# Patient Record
Sex: MALE | Race: WHITE | NOT HISPANIC OR LATINO | Employment: FULL TIME | URBAN - METROPOLITAN AREA
[De-identification: names, ages, dates, MRNs, and addresses within clinical notes are randomized per-mention and may not be internally consistent; named-entity substitution may affect disease eponyms.]

---

## 2023-08-21 ENCOUNTER — HOSPITAL ENCOUNTER (OUTPATIENT)
Dept: DATA CONVERSION | Facility: HOSPITAL | Age: 53
Discharge: HOME | End: 2023-08-21
Payer: COMMERCIAL

## 2023-08-21 DIAGNOSIS — I10 ESSENTIAL (PRIMARY) HYPERTENSION: ICD-10-CM

## 2023-08-21 LAB
ANION GAP SERPL CALCULATED.3IONS-SCNC: 18 MMOL/L (ref 0–19)
BUN SERPL-MCNC: 16 MG/DL (ref 8–25)
BUN/CREAT SERPL: 13.3 RATIO (ref 8–21)
CALCIUM SERPL-MCNC: 9.4 MG/DL (ref 8.5–10.4)
CHLORIDE SERPL-SCNC: 100 MMOL/L (ref 97–107)
CO2 SERPL-SCNC: 23 MMOL/L (ref 24–31)
CREAT SERPL-MCNC: 1.2 MG/DL (ref 0.4–1.6)
GFR SERPL CREATININE-BSD FRML MDRD: 71 ML/MIN/1.73 M2
GLUCOSE SERPL-MCNC: 104 MG/DL (ref 65–99)
POTASSIUM SERPL-SCNC: 3.9 MMOL/L (ref 3.4–5.1)
SODIUM SERPL-SCNC: 141 MMOL/L (ref 133–145)

## 2023-09-29 ENCOUNTER — HOSPITAL ENCOUNTER (OUTPATIENT)
Dept: DATA CONVERSION | Facility: HOSPITAL | Age: 53
Discharge: HOME | End: 2023-09-29
Payer: COMMERCIAL

## 2023-09-29 DIAGNOSIS — M51.36 OTHER INTERVERTEBRAL DISC DEGENERATION, LUMBAR REGION: ICD-10-CM

## 2023-09-29 DIAGNOSIS — M47.26 OTHER SPONDYLOSIS WITH RADICULOPATHY, LUMBAR REGION: ICD-10-CM

## 2023-10-29 DIAGNOSIS — I10 PRIMARY HYPERTENSION: Primary | ICD-10-CM

## 2023-10-30 RX ORDER — HYDROCHLOROTHIAZIDE 25 MG/1
25 TABLET ORAL DAILY
Qty: 90 TABLET | Refills: 1 | Status: SHIPPED | OUTPATIENT
Start: 2023-10-30 | End: 2023-12-16 | Stop reason: HOSPADM

## 2023-11-28 ENCOUNTER — APPOINTMENT (OUTPATIENT)
Dept: RADIOLOGY | Facility: HOSPITAL | Age: 53
DRG: 392 | End: 2023-11-28
Payer: COMMERCIAL

## 2023-11-28 ENCOUNTER — HOSPITAL ENCOUNTER (INPATIENT)
Facility: HOSPITAL | Age: 53
LOS: 2 days | Discharge: HOME | DRG: 392 | End: 2023-11-30
Attending: STUDENT IN AN ORGANIZED HEALTH CARE EDUCATION/TRAINING PROGRAM | Admitting: INTERNAL MEDICINE
Payer: COMMERCIAL

## 2023-11-28 DIAGNOSIS — R10.9 ABDOMINAL PAIN: ICD-10-CM

## 2023-11-28 DIAGNOSIS — K57.20 DIVERTICULITIS OF LARGE INTESTINE WITH ABSCESS WITHOUT BLEEDING: Primary | ICD-10-CM

## 2023-11-28 DIAGNOSIS — E87.6 HYPOKALEMIA: ICD-10-CM

## 2023-11-28 PROBLEM — K57.80 DIVERTICULITIS OF INTESTINE WITH ABSCESS: Status: ACTIVE | Noted: 2023-11-28

## 2023-11-28 LAB
ALBUMIN SERPL-MCNC: 4.2 G/DL (ref 3.5–5)
ALP BLD-CCNC: 93 U/L (ref 35–125)
ALT SERPL-CCNC: 44 U/L (ref 5–40)
ANION GAP SERPL CALC-SCNC: 11 MMOL/L
APPEARANCE UR: CLEAR
AST SERPL-CCNC: 23 U/L (ref 5–40)
BASOPHILS # BLD AUTO: 0.03 X10*3/UL (ref 0–0.1)
BASOPHILS NFR BLD AUTO: 0.2 %
BILIRUB SERPL-MCNC: 0.4 MG/DL (ref 0.1–1.2)
BILIRUB UR STRIP.AUTO-MCNC: NEGATIVE MG/DL
BUN SERPL-MCNC: 12 MG/DL (ref 8–25)
CALCIUM SERPL-MCNC: 9 MG/DL (ref 8.5–10.4)
CHLORIDE SERPL-SCNC: 96 MMOL/L (ref 97–107)
CO2 SERPL-SCNC: 28 MMOL/L (ref 24–31)
COLOR UR: ABNORMAL
CREAT SERPL-MCNC: 1.1 MG/DL (ref 0.4–1.6)
EOSINOPHIL # BLD AUTO: 0.04 X10*3/UL (ref 0–0.7)
EOSINOPHIL NFR BLD AUTO: 0.3 %
ERYTHROCYTE [DISTWIDTH] IN BLOOD BY AUTOMATED COUNT: 11.9 % (ref 11.5–14.5)
GFR SERPL CREATININE-BSD FRML MDRD: 79 ML/MIN/1.73M*2
GLUCOSE SERPL-MCNC: 137 MG/DL (ref 65–99)
GLUCOSE UR STRIP.AUTO-MCNC: NORMAL MG/DL
HCT VFR BLD AUTO: 46 % (ref 41–52)
HGB BLD-MCNC: 16.1 G/DL (ref 13.5–17.5)
IMM GRANULOCYTES # BLD AUTO: 0.06 X10*3/UL (ref 0–0.7)
IMM GRANULOCYTES NFR BLD AUTO: 0.4 % (ref 0–0.9)
KETONES UR STRIP.AUTO-MCNC: NEGATIVE MG/DL
LEUKOCYTE ESTERASE UR QL STRIP.AUTO: NEGATIVE
LYMPHOCYTES # BLD AUTO: 2.65 X10*3/UL (ref 1.2–4.8)
LYMPHOCYTES NFR BLD AUTO: 18.6 %
MCH RBC QN AUTO: 32.2 PG (ref 26–34)
MCHC RBC AUTO-ENTMCNC: 35 G/DL (ref 32–36)
MCV RBC AUTO: 92 FL (ref 80–100)
MONOCYTES # BLD AUTO: 0.68 X10*3/UL (ref 0.1–1)
MONOCYTES NFR BLD AUTO: 4.8 %
NEUTROPHILS # BLD AUTO: 10.8 X10*3/UL (ref 1.2–7.7)
NEUTROPHILS NFR BLD AUTO: 75.7 %
NITRITE UR QL STRIP.AUTO: NEGATIVE
NRBC BLD-RTO: 0 /100 WBCS (ref 0–0)
PH UR STRIP.AUTO: 5.5 [PH]
PLATELET # BLD AUTO: 287 X10*3/UL (ref 150–450)
POTASSIUM SERPL-SCNC: 3.3 MMOL/L (ref 3.4–5.1)
PROT SERPL-MCNC: 8 G/DL (ref 5.9–7.9)
PROT UR STRIP.AUTO-MCNC: NEGATIVE MG/DL
RBC # BLD AUTO: 5 X10*6/UL (ref 4.5–5.9)
RBC # UR STRIP.AUTO: ABNORMAL /UL
RBC #/AREA URNS AUTO: NORMAL /HPF
SODIUM SERPL-SCNC: 135 MMOL/L (ref 133–145)
SP GR UR STRIP.AUTO: 1.01
UROBILINOGEN UR STRIP.AUTO-MCNC: NORMAL MG/DL
WBC # BLD AUTO: 14.3 X10*3/UL (ref 4.4–11.3)
WBC #/AREA URNS AUTO: NORMAL /HPF

## 2023-11-28 PROCEDURE — 36415 COLL VENOUS BLD VENIPUNCTURE: CPT | Performed by: STUDENT IN AN ORGANIZED HEALTH CARE EDUCATION/TRAINING PROGRAM

## 2023-11-28 PROCEDURE — 49406 IMAGE CATH FLUID PERI/RETRO: CPT | Mod: 52

## 2023-11-28 PROCEDURE — 81001 URINALYSIS AUTO W/SCOPE: CPT | Performed by: STUDENT IN AN ORGANIZED HEALTH CARE EDUCATION/TRAINING PROGRAM

## 2023-11-28 PROCEDURE — 2500000004 HC RX 250 GENERAL PHARMACY W/ HCPCS (ALT 636 FOR OP/ED): Performed by: NURSE PRACTITIONER

## 2023-11-28 PROCEDURE — 85025 COMPLETE CBC W/AUTO DIFF WBC: CPT | Performed by: STUDENT IN AN ORGANIZED HEALTH CARE EDUCATION/TRAINING PROGRAM

## 2023-11-28 PROCEDURE — 2500000004 HC RX 250 GENERAL PHARMACY W/ HCPCS (ALT 636 FOR OP/ED): Performed by: STUDENT IN AN ORGANIZED HEALTH CARE EDUCATION/TRAINING PROGRAM

## 2023-11-28 PROCEDURE — 2550000001 HC RX 255 CONTRASTS: Performed by: STUDENT IN AN ORGANIZED HEALTH CARE EDUCATION/TRAINING PROGRAM

## 2023-11-28 PROCEDURE — 99285 EMERGENCY DEPT VISIT HI MDM: CPT | Performed by: STUDENT IN AN ORGANIZED HEALTH CARE EDUCATION/TRAINING PROGRAM

## 2023-11-28 PROCEDURE — 96365 THER/PROPH/DIAG IV INF INIT: CPT

## 2023-11-28 PROCEDURE — 80053 COMPREHEN METABOLIC PANEL: CPT | Performed by: STUDENT IN AN ORGANIZED HEALTH CARE EDUCATION/TRAINING PROGRAM

## 2023-11-28 PROCEDURE — 1100000001 HC PRIVATE ROOM DAILY

## 2023-11-28 PROCEDURE — 74177 CT ABD & PELVIS W/CONTRAST: CPT

## 2023-11-28 RX ORDER — POTASSIUM CHLORIDE 20 MEQ/1
40 TABLET, EXTENDED RELEASE ORAL ONCE
Status: COMPLETED | OUTPATIENT
Start: 2023-11-28 | End: 2023-11-28

## 2023-11-28 RX ORDER — ACETAMINOPHEN 325 MG/1
650 TABLET ORAL EVERY 4 HOURS PRN
Status: DISCONTINUED | OUTPATIENT
Start: 2023-11-28 | End: 2023-11-30 | Stop reason: HOSPADM

## 2023-11-28 RX ORDER — HYDROCHLOROTHIAZIDE 25 MG/1
25 TABLET ORAL DAILY
Status: DISCONTINUED | OUTPATIENT
Start: 2023-11-28 | End: 2023-11-30 | Stop reason: HOSPADM

## 2023-11-28 RX ORDER — PANTOPRAZOLE SODIUM 40 MG/1
40 TABLET, DELAYED RELEASE ORAL
Status: DISCONTINUED | OUTPATIENT
Start: 2023-11-29 | End: 2023-11-30 | Stop reason: HOSPADM

## 2023-11-28 RX ORDER — PANTOPRAZOLE SODIUM 40 MG/10ML
40 INJECTION, POWDER, LYOPHILIZED, FOR SOLUTION INTRAVENOUS
Status: DISCONTINUED | OUTPATIENT
Start: 2023-11-29 | End: 2023-11-30 | Stop reason: HOSPADM

## 2023-11-28 RX ORDER — LANSOPRAZOLE 30 MG/1
30 CAPSULE, DELAYED RELEASE ORAL
COMMUNITY
Start: 2017-01-06

## 2023-11-28 RX ORDER — ACETAMINOPHEN 650 MG/1
650 SUPPOSITORY RECTAL EVERY 4 HOURS PRN
Status: DISCONTINUED | OUTPATIENT
Start: 2023-11-28 | End: 2023-11-30 | Stop reason: HOSPADM

## 2023-11-28 RX ORDER — TRAMADOL HYDROCHLORIDE 50 MG/1
50 TABLET ORAL EVERY 6 HOURS PRN
Status: DISCONTINUED | OUTPATIENT
Start: 2023-11-28 | End: 2023-11-30 | Stop reason: HOSPADM

## 2023-11-28 RX ORDER — SODIUM CHLORIDE 9 MG/ML
100 INJECTION, SOLUTION INTRAVENOUS CONTINUOUS
Status: DISCONTINUED | OUTPATIENT
Start: 2023-11-28 | End: 2023-11-29

## 2023-11-28 RX ORDER — ONDANSETRON 4 MG/1
4 TABLET, ORALLY DISINTEGRATING ORAL EVERY 8 HOURS PRN
Status: DISCONTINUED | OUTPATIENT
Start: 2023-11-28 | End: 2023-11-30 | Stop reason: HOSPADM

## 2023-11-28 RX ORDER — POLYETHYLENE GLYCOL 3350 17 G/17G
17 POWDER, FOR SOLUTION ORAL DAILY PRN
Status: DISCONTINUED | OUTPATIENT
Start: 2023-11-28 | End: 2023-11-28

## 2023-11-28 RX ORDER — HYDRALAZINE HYDROCHLORIDE 20 MG/ML
10 INJECTION INTRAMUSCULAR; INTRAVENOUS EVERY 6 HOURS PRN
Status: DISCONTINUED | OUTPATIENT
Start: 2023-11-28 | End: 2023-11-30 | Stop reason: HOSPADM

## 2023-11-28 RX ORDER — ACETAMINOPHEN 160 MG/5ML
650 SOLUTION ORAL EVERY 4 HOURS PRN
Status: DISCONTINUED | OUTPATIENT
Start: 2023-11-28 | End: 2023-11-30 | Stop reason: HOSPADM

## 2023-11-28 RX ORDER — MORPHINE SULFATE 2 MG/ML
2 INJECTION, SOLUTION INTRAMUSCULAR; INTRAVENOUS EVERY 4 HOURS PRN
Status: DISCONTINUED | OUTPATIENT
Start: 2023-11-28 | End: 2023-11-30 | Stop reason: HOSPADM

## 2023-11-28 RX ORDER — ONDANSETRON HYDROCHLORIDE 2 MG/ML
4 INJECTION, SOLUTION INTRAVENOUS EVERY 8 HOURS PRN
Status: DISCONTINUED | OUTPATIENT
Start: 2023-11-28 | End: 2023-11-30 | Stop reason: HOSPADM

## 2023-11-28 RX ADMIN — PIPERACILLIN SODIUM AND TAZOBACTAM SODIUM 3.38 G: 3; .375 INJECTION, SOLUTION INTRAVENOUS at 20:27

## 2023-11-28 RX ADMIN — SODIUM CHLORIDE 3 G: 900 INJECTION INTRAVENOUS at 11:55

## 2023-11-28 RX ADMIN — IOHEXOL 75 ML: 350 INJECTION, SOLUTION INTRAVENOUS at 10:23

## 2023-11-28 RX ADMIN — POTASSIUM CHLORIDE 40 MEQ: 1500 TABLET, EXTENDED RELEASE ORAL at 11:06

## 2023-11-28 RX ADMIN — SODIUM CHLORIDE 100 ML/HR: 900 INJECTION, SOLUTION INTRAVENOUS at 14:21

## 2023-11-28 RX ADMIN — PIPERACILLIN SODIUM AND TAZOBACTAM SODIUM 3.38 G: 3; .375 INJECTION, SOLUTION INTRAVENOUS at 14:46

## 2023-11-28 SDOH — SOCIAL STABILITY: SOCIAL INSECURITY: ARE YOU OR HAVE YOU BEEN THREATENED OR ABUSED PHYSICALLY, EMOTIONALLY, OR SEXUALLY BY ANYONE?: NO

## 2023-11-28 SDOH — SOCIAL STABILITY: SOCIAL INSECURITY: DOES ANYONE TRY TO KEEP YOU FROM HAVING/CONTACTING OTHER FRIENDS OR DOING THINGS OUTSIDE YOUR HOME?: NO

## 2023-11-28 SDOH — SOCIAL STABILITY: SOCIAL INSECURITY: ARE THERE ANY APPARENT SIGNS OF INJURIES/BEHAVIORS THAT COULD BE RELATED TO ABUSE/NEGLECT?: NO

## 2023-11-28 SDOH — SOCIAL STABILITY: SOCIAL INSECURITY: HAS ANYONE EVER THREATENED TO HURT YOUR FAMILY OR YOUR PETS?: NO

## 2023-11-28 SDOH — SOCIAL STABILITY: SOCIAL INSECURITY: ABUSE: ADULT

## 2023-11-28 SDOH — SOCIAL STABILITY: SOCIAL INSECURITY: DO YOU FEEL ANYONE HAS EXPLOITED OR TAKEN ADVANTAGE OF YOU FINANCIALLY OR OF YOUR PERSONAL PROPERTY?: NO

## 2023-11-28 SDOH — SOCIAL STABILITY: SOCIAL INSECURITY: DO YOU FEEL UNSAFE GOING BACK TO THE PLACE WHERE YOU ARE LIVING?: NO

## 2023-11-28 SDOH — SOCIAL STABILITY: SOCIAL INSECURITY: WERE YOU ABLE TO COMPLETE ALL THE BEHAVIORAL HEALTH SCREENINGS?: YES

## 2023-11-28 SDOH — SOCIAL STABILITY: SOCIAL INSECURITY: HAVE YOU HAD THOUGHTS OF HARMING ANYONE ELSE?: NO

## 2023-11-28 ASSESSMENT — PAIN DESCRIPTION - LOCATION: LOCATION: ABDOMEN

## 2023-11-28 ASSESSMENT — ACTIVITIES OF DAILY LIVING (ADL)
FEEDING YOURSELF: INDEPENDENT
PATIENT'S MEMORY ADEQUATE TO SAFELY COMPLETE DAILY ACTIVITIES?: YES
JUDGMENT_ADEQUATE_SAFELY_COMPLETE_DAILY_ACTIVITIES: YES
TOILETING: INDEPENDENT
HEARING - LEFT EAR: FUNCTIONAL
LACK_OF_TRANSPORTATION: NO
WALKS IN HOME: INDEPENDENT
HEARING - RIGHT EAR: FUNCTIONAL
BATHING: INDEPENDENT
ADEQUATE_TO_COMPLETE_ADL: YES
DRESSING YOURSELF: INDEPENDENT
GROOMING: INDEPENDENT

## 2023-11-28 ASSESSMENT — COGNITIVE AND FUNCTIONAL STATUS - GENERAL
PATIENT BASELINE BEDBOUND: NO
DAILY ACTIVITIY SCORE: 24
MOBILITY SCORE: 24
DAILY ACTIVITIY SCORE: 24
MOBILITY SCORE: 24

## 2023-11-28 ASSESSMENT — PAIN SCALES - GENERAL
PAINLEVEL_OUTOF10: 3
PAINLEVEL_OUTOF10: 0 - NO PAIN
PAINLEVEL_OUTOF10: 1
PAINLEVEL_OUTOF10: 2

## 2023-11-28 ASSESSMENT — COLUMBIA-SUICIDE SEVERITY RATING SCALE - C-SSRS
2. HAVE YOU ACTUALLY HAD ANY THOUGHTS OF KILLING YOURSELF?: NO
6. HAVE YOU EVER DONE ANYTHING, STARTED TO DO ANYTHING, OR PREPARED TO DO ANYTHING TO END YOUR LIFE?: NO
1. IN THE PAST MONTH, HAVE YOU WISHED YOU WERE DEAD OR WISHED YOU COULD GO TO SLEEP AND NOT WAKE UP?: NO

## 2023-11-28 ASSESSMENT — LIFESTYLE VARIABLES
HOW OFTEN DO YOU HAVE 6 OR MORE DRINKS ON ONE OCCASION: LESS THAN MONTHLY
AUDIT TOTAL SCORE: 3
HOW MANY STANDARD DRINKS CONTAINING ALCOHOL DO YOU HAVE ON A TYPICAL DAY: 1 OR 2
AUDIT TOTAL SCORE: 0
AUDIT-C TOTAL SCORE: 3
SKIP TO QUESTIONS 9-10: 0
HOW OFTEN DURING THE LAST YEAR HAVE YOU NEEDED AN ALCOHOLIC DRINK FIRST THING IN THE MORNING TO GET YOURSELF GOING AFTER A NIGHT OF HEAVY DRINKING: NEVER
HOW OFTEN DURING THE LAST YEAR HAVE YOU FOUND THAT YOU WERE NOT ABLE TO STOP DRINKING ONCE YOU HAD STARTED: NEVER
HOW OFTEN DURING THE LAST YEAR HAVE YOU FAILED TO DO WHAT WAS NORMALLY EXPECTED FROM YOU BECAUSE OF DRINKING: NEVER
HOW OFTEN DURING THE LAST YEAR HAVE YOU BEEN UNABLE TO REMEMBER WHAT HAPPENED THE NIGHT BEFORE BECAUSE YOU HAD BEEN DRINKING: NEVER
HAVE YOU OR SOMEONE ELSE BEEN INJURED AS A RESULT OF YOUR DRINKING: NO
HAS A RELATIVE, FRIEND, DOCTOR, OR ANOTHER HEALTH PROFESSIONAL EXPRESSED CONCERN ABOUT YOUR DRINKING OR SUGGESTED YOU CUT DOWN: NO
HOW OFTEN DURING THE LAST YEAR HAVE YOU HAD A FEELING OF GUILT OR REMORSE AFTER DRINKING: NEVER
AUDIT-C TOTAL SCORE: 3
HOW OFTEN DO YOU HAVE A DRINK CONTAINING ALCOHOL: 2-4 TIMES A MONTH

## 2023-11-28 ASSESSMENT — PATIENT HEALTH QUESTIONNAIRE - PHQ9
2. FEELING DOWN, DEPRESSED OR HOPELESS: NOT AT ALL
SUM OF ALL RESPONSES TO PHQ9 QUESTIONS 1 & 2: 0
1. LITTLE INTEREST OR PLEASURE IN DOING THINGS: NOT AT ALL

## 2023-11-28 ASSESSMENT — PAIN - FUNCTIONAL ASSESSMENT
PAIN_FUNCTIONAL_ASSESSMENT: 0-10

## 2023-11-28 NOTE — H&P
History Of Present Illness  Tom Treviño is a 53 y.o. male presenting with abdominal pain. States he has a history of diverticulitis, usually gets 2-3 flares per year. His last flare was back in August and resolved with oral abx. He has been having abdominal pain for the past 3 weeks that would come and go. He developed GI flu like symptoms last week, his whole family had same symptoms with nausea, vomiting and diarrhea. He states lasted for a day and resolved. Since then he has had worsening abdominal pain mostly located in the LLQ prompting him to come in for further evaluation. CT imaging of the abd showed acute diverticulitis with abscess collection. He denies any fevers, chills, SOB, chest pain, further nausea, vomiting or diarrhea, hematochezia,  melena, urinary symptoms prconstipation. Last BM was yesterday which he states was formed and normal. He did eat normal dinner last night.   ED: Unasyn, K 40meq x1, spoke with Dr. Delgado who recommended IR consult.   He will be admitted for further work up and management of acute diverticulitis with asbcess.      Past Medical History  Past Medical History:   Diagnosis Date    COVID-19     COVID-19    Diverticulitis     GERD (gastroesophageal reflux disease)     HTN (hypertension)        Surgical History  Past Surgical History:   Procedure Laterality Date    APPENDECTOMY  02/17/2015    Appendectomy        Social History  Home with his wife and child. Works in construction. Denies tobacco use. Very rare ETOH.     Family History  Mother is alive in her 80's without major medical issue.   Father passed in his 80's, stroke.      Allergies  Patient has no known allergies.    Review of Systems    Please see pertinent positives in the HPI and past medical and surgical histories.      Physical Exam     General: Pleasant, awake, alert.   HEENT: PERRLA, no facial asymmetry noted. Normocephalic, mucous membranes moist.   Neck: No JVD, supple.  Cardiovascular: Regular rate and  "rhythm. Normal S1 and S2. No murmurs, rubs or gallops.   Respiratory: Clear to auscultation on room air.   Abdomen: Soft, round, tender to palpation in the LLQ, no gaurding. Bowel sounds present and normoactive.  Extremities: No peripheral cyanosis. No edema.   Neurologic: Alert and oriented to person, place and time. Normal sensation.   Dermatologic: No rash, ecchymosis, or jaundice.  Psychological: Appropriate affect and behavior.     Last Recorded Vitals  Blood pressure (!) 143/91, pulse 75, temperature 36.5 °C (97.7 °F), temperature source Oral, resp. rate 18, height 1.88 m (6' 2\"), weight 121 kg (266 lb 5.1 oz), SpO2 98 %.    Relevant Results      Results for orders placed or performed during the hospital encounter of 11/28/23 (from the past 24 hour(s))   CBC and Auto Differential   Result Value Ref Range    WBC 14.3 (H) 4.4 - 11.3 x10*3/uL    nRBC 0.0 0.0 - 0.0 /100 WBCs    RBC 5.00 4.50 - 5.90 x10*6/uL    Hemoglobin 16.1 13.5 - 17.5 g/dL    Hematocrit 46.0 41.0 - 52.0 %    MCV 92 80 - 100 fL    MCH 32.2 26.0 - 34.0 pg    MCHC 35.0 32.0 - 36.0 g/dL    RDW 11.9 11.5 - 14.5 %    Platelets 287 150 - 450 x10*3/uL    Neutrophils % 75.7 40.0 - 80.0 %    Immature Granulocytes %, Automated 0.4 0.0 - 0.9 %    Lymphocytes % 18.6 13.0 - 44.0 %    Monocytes % 4.8 2.0 - 10.0 %    Eosinophils % 0.3 0.0 - 6.0 %    Basophils % 0.2 0.0 - 2.0 %    Neutrophils Absolute 10.80 (H) 1.20 - 7.70 x10*3/uL    Immature Granulocytes Absolute, Automated 0.06 0.00 - 0.70 x10*3/uL    Lymphocytes Absolute 2.65 1.20 - 4.80 x10*3/uL    Monocytes Absolute 0.68 0.10 - 1.00 x10*3/uL    Eosinophils Absolute 0.04 0.00 - 0.70 x10*3/uL    Basophils Absolute 0.03 0.00 - 0.10 x10*3/uL   Comprehensive metabolic panel   Result Value Ref Range    Glucose 137 (H) 65 - 99 mg/dL    Sodium 135 133 - 145 mmol/L    Potassium 3.3 (L) 3.4 - 5.1 mmol/L    Chloride 96 (L) 97 - 107 mmol/L    Bicarbonate 28 24 - 31 mmol/L    Urea Nitrogen 12 8 - 25 mg/dL    " Creatinine 1.10 0.40 - 1.60 mg/dL    eGFR 79 >60 mL/min/1.73m*2    Calcium 9.0 8.5 - 10.4 mg/dL    Albumin 4.2 3.5 - 5.0 g/dL    Alkaline Phosphatase 93 35 - 125 U/L    Total Protein 8.0 (H) 5.9 - 7.9 g/dL    AST 23 5 - 40 U/L    Bilirubin, Total 0.4 0.1 - 1.2 mg/dL    ALT 44 (H) 5 - 40 U/L    Anion Gap 11 <=19 mmol/L   Urinalysis with Reflex Microscopic   Result Value Ref Range    Color, Urine Light-Yellow Light-Yellow, Yellow, Dark-Yellow    Appearance, Urine Clear Clear    Specific Gravity, Urine 1.011 1.005 - 1.035    pH, Urine 5.5 5.0, 5.5, 6.0, 6.5, 7.0, 7.5, 8.0    Protein, Urine NEGATIVE NEGATIVE, 10 (TRACE), 20 (TRACE) mg/dL    Glucose, Urine Normal Normal mg/dL    Blood, Urine 0.03 (TRACE) (A) NEGATIVE    Ketones, Urine NEGATIVE NEGATIVE mg/dL    Bilirubin, Urine NEGATIVE NEGATIVE    Urobilinogen, Urine Normal Normal mg/dL    Nitrite, Urine NEGATIVE NEGATIVE    Leukocyte Esterase, Urine NEGATIVE NEGATIVE   Microscopic Only, Urine   Result Value Ref Range    WBC, Urine NONE 1-5, NONE /HPF    RBC, Urine NONE NONE, 1-2, 3-5 /HPF      CT abdomen pelvis w IV contrast    Result Date: 11/28/2023  Interpreted By:  Richard Gilman, STUDY: CT ABDOMEN PELVIS W IV CONTRAST; 11/28/2023 10:22 am   INDICATION: Signs/Symptoms:hx of diverticulitis, left lower quadrant pain;   COMPARISON: 07/10/2020   ACCESSION NUMBER(S): [id number]   ORDERING CLINICIAN: KAVON RIZZO   TECHNIQUE: Contiguous axial images of the abdomen/pelvis were performed with IV contrast. 75 ml of Omnipaque 350 was utilized. Coronal and sagittal reformatted images were also obtained. All CT examinations are performed with 1 or more of the following dose reduction techniques: Automated exposure control, adjustment of mA and/or kv according to patient's size, or use of iterative reconstruction techniques.     FINDINGS: The liver, gallbladder,  common bile duct, pancreas, spleen, and adrenal glands are unremarkable.   The kidneys enhance  symmetrically. No urolithiasis is seen. No hydroureteronephrosis is seen.   The visualized aorta is unremarkable.   The small bowel is not dilated. The appendix is not visualized. There is acute diverticulitis at the mid-distal sigmoid colon. There is abscess formation along the serosal surface width fluid and gas formation. The abscess measures approximately 4.4 x 3.4 cm in maximum dimensions.   The bladder is normally distended with no gross wall thickening.   The visualized osseous structures are intact.   Limited images of the lower thorax are unremarkable.       acute diverticulitis at the mid-distal sigmoid colon. There is abscess formation along the serosal surface width fluid and gas formation. The abscess measures approximately 4.4 x 3.4 cm in maximum dimensions.   Signed by: Richard Gilman 11/28/2023 11:18 AM Dictation workstation:   [id number]          Assessment/Plan     Acute Diverticulitis with Abscess / Abdominal Pain  -General Surgery and IR consults.   -CT abd/pelvis shows acute diverticulitis mid distal sigmoid colon with abscess formation with fluid and gas 4.4 cm x 3.4 cm.   -NPO for now.   -IV zosyn.   -IVF hydration.   -Pain control.   -PRN antiemetics.   -He follows outpatient with GI Dr. Cerrato, was scheduled for scope on Monday which is currently  on hold.     Leukocytosis  -Secondary to #1.   -Monitor.     HTN  -On hydrochlorothiazide at home, holding for now with need for hydration.   -PRN hydralazine.   -Monitor.     GERD  -PPI.     DVT Risk  -Holding off on pharm agent at this time in case drainage of abscess collection needed.   -SCDs.     Plan  Case and plan was discussed with patient, he is agreeable.   Case and plan was also discussed with my collaborating physician Dr. Raoul Carson.     CODE STATUS FULL.        I spent 60 minutes in the professional and overall care of this patient.      Joycelyn Solorio, ROWDY-CNP

## 2023-11-28 NOTE — CONSULTS
Reason For Consult  Diverticulitis with abscess- initially from the ER and then also IM     [facility]     History Of Present Illness  Tom Treviño is a 53 y.o. male on day 0 of admission presenting  with a chief complaint of left lower quadrant abdominal pain.  He initially had this a couple of weeks ago but it got better on his own without any antibiotics.  Him and his family then developed the GI flu that they are better from.  He developed left lower quadrant pain that started yesterday evening that was sudden, describes as intense, intermittent, persistent but no worse, no radiation, upon ER presentation was a 6-7 and was doubling him over and is currently a 2.  He has a history of diverticulitis and his current symptoms are similar to his diverticulitis symptoms in the past.  He has never been hospitalized for diverticulitis.  He was initially diagnosed with diverticulitis approximately 3 years ago and gets treated for it approximately twice a year as an outpatient.  Had subsequent nausea.  No vomiting.  No gas today.  Was passing gas yesterday.  Last bowel movement was this morning and was without blood and normal except for being a little blackish.  Says that the day before yesterday he did take some Pepto-Bismol.  Last colonoscopy was done 4 years ago by Dr. Cerrato for the indications of screening and was negative.  Says he was scheduled to have another colonoscopy this past Monday and had to cancel it because him and his family had the GI flu.  Has not eaten anything out of the ordinary or traveled anywhere.    Had some crackers around 8 this morning.  Last drank this morning around 9. Is not on any anticoagulants at home.  Please see his below past medical and past abdominal surgical history.  Please also see his labs and imaging that are noted.    Past Medical History  Diverticulitis  GERD  Hypertension  Obesity  Umbilical hernia    Surgical History  Open appendectomy   2/17/2015    Social History  Comes from home with his wife and son  Works in construction  Never smoker  No drugs  Alcohol wise says has couple per day and last 1 was last night       Family History  Denies any family history of diverticulitis or colon cancer       Allergies  No Known Allergies     Review of Systems  1) Anesthetic complications: No known personal or family history of anesthetic complications  2) General: No significant unintentional weight loss or gain, fevers, chills, weakness, fatigue, appetite loss.  3) HEENT: Negative.  4) Cardiac: No angina or leg edema.  5) Pulmonary: No asthma, wheezing, sob.  6) GI: As per HPI.  7) Hematologic: No known personal or family history of bleeding diathesis.  8) Endocrine: No diabetes or thyroid disorders.  9) : No dysuria, hematuria, or frequency.  10) Musculoskeletal: No muscle/bone/joint pain/stiffness/swelling.  11) Neuro: No history of seizures or strokes.  12) Psych: No anxiety or depression    Physical Exam  1) VS-noted as documented.  2) General-sitting in chair in no acute distress. Not septic appearing. Pleasant and cooperative. Looks fine and comfortable.   3) Neuro-Awake, alert, oriented to person, place, and time. Speech is normal. Affect is normal. Follows commands appropriately.  4) HEENT-Head normocephalic and externally atraumatic. Conjunctiva pink. Sclera anicteric. MMM.  5) Heart-RRR. Not on the monitor.  6) Lungs-Clear. Speaks in complete sentences and does not have any accessory muscle use.  7) Extremities-No edema. The patient's extremities are warm and normal color.  8) Skin-Warm and dry. No diaphoresis or jaundice.  9) Psych-Normal mood. Appropriate affect.   10) Abdomen-Soft. Positive bowel sounds. Non distended. Nontender.  No guarding/rebound.  Small umbilical hernia that is benign that the patient did not know he had  Obese    Vital signs in last 24 hours:  Temp:  [36.5 °C (97.7 °F)-36.8 °C (98.2 °F)] 36.8 °C (98.2 °F)  Heart Rate:   "[72-75] 72  Resp:  [18-20] 20  BP: (136-143)/(89-92) 136/89  Heart Rate:  [72-75]   Temp:  [36.5 °C (97.7 °F)-36.8 °C (98.2 °F)]   Resp:  [18-20]   BP: (136-143)/(89-92)   Height:  [188 cm (6' 2\")]   Weight:  [121 kg (266 lb 5.1 oz)]   SpO2:  [98 %-100 %]      Intake/Output last 3 Shifts:  No intake/output data recorded.    Scheduled medications  [Held by provider] hydroCHLOROthiazide, 25 mg, oral, Daily  [START ON 11/29/2023] pantoprazole, 40 mg, oral, Daily before breakfast   Or  [START ON 11/29/2023] pantoprazole, 40 mg, intravenous, Daily before breakfast  piperacillin-tazobactam, 3.375 g, intravenous, q6h      Continuous medications  sodium chloride 0.9%, 100 mL/hr      PRN medications  PRN medications: acetaminophen **OR** acetaminophen **OR** acetaminophen, hydrALAZINE, morphine, ondansetron ODT **OR** ondansetron, traMADol    Relevant Results  Results from last 7 days   Lab Units 11/28/23  0943   WBC AUTO x10*3/uL 14.3*   HEMOGLOBIN g/dL 16.1   HEMATOCRIT % 46.0   PLATELETS AUTO x10*3/uL 287      Results from last 7 days   Lab Units 11/28/23  0943   SODIUM mmol/L 135   POTASSIUM mmol/L 3.3*   CHLORIDE mmol/L 96*   CO2 mmol/L 28   BUN mg/dL 12   CREATININE mg/dL 1.10   GLUCOSE mg/dL 137*   CALCIUM mg/dL 9.0      CT abdomen pelvis w IV contrast  Result Date: 11/28/2023  Interpreted By:  Richard Gilman, STUDY: CT ABDOMEN PELVIS W IV CONTRAST; 11/28/2023 10:22 am   INDICATION: Signs/Symptoms:hx of diverticulitis, left lower quadrant pain;   COMPARISON: 07/10/2020   ACCESSION NUMBER(S): [id number]   ORDERING CLINICIAN: KAVON RIZZO   TECHNIQUE: Contiguous axial images of the abdomen/pelvis were performed with IV contrast. 75 ml of Omnipaque 350 was utilized. Coronal and sagittal reformatted images were also obtained. All CT examinations are performed with 1 or more of the following dose reduction techniques: Automated exposure control, adjustment of mA and/or kv according to patient's size, or use " of iterative reconstruction techniques.     FINDINGS: The liver, gallbladder,  common bile duct, pancreas, spleen, and adrenal glands are unremarkable.   The kidneys enhance symmetrically. No urolithiasis is seen. No hydroureteronephrosis is seen.   The visualized aorta is unremarkable.   The small bowel is not dilated. The appendix is not visualized. There is acute diverticulitis at the mid-distal sigmoid colon. There is abscess formation along the serosal surface width fluid and gas formation. The abscess measures approximately 4.4 x 3.4 cm in maximum dimensions.   The bladder is normally distended with no gross wall thickening.   The visualized osseous structures are intact.   Limited images of the lower thorax are unremarkable.       acute diverticulitis at the mid-distal sigmoid colon. There is abscess formation along the serosal surface width fluid and gas formation. The abscess measures approximately 4.4 x 3.4 cm in maximum dimensions.   Signed by: Richard Gilman 11/28/2023 11:18 AM Dictation workstation:   [id number]     Assessment/Plan   Diverticulitis of intestine with abscess  N.p.o.  IV fluids  On Zosyn  IR consulted for drainage  Morning labs ordered  Discussed with patient if he gets better without surgery then he needs to have a colonoscopy in 6 to 8 weeks down the road to make sure that this is only diverticulitis and not a perforated colon cancer  No acute surgical indications at present  Will follow    Umbilical hernia  Benign  No acute surgical indications for at present    DVT prophylaxis  Per IM        Mai Wells, APRN-CNP

## 2023-11-28 NOTE — ED PROVIDER NOTES
HPI   Chief Complaint   Patient presents with    Abdominal Pain     Abd pain x1 week, possible diverticulitis with history, over the holiday weekend had flu like s/s. Denies nausea and vomiting on arrival today        53-year-old male with past medical history of hypertension, high cholesterol presents with lower abdominal pain.  Patient states he had a prior history of diverticulitis, treated with oral antibiotics.  States he developed pain similar in nature approximately 2 weeks ago, left lower quadrant in location, nonradiating.  Pain started to spontaneously resolved until he became ill with a stomach flu, with associated nausea and vomiting and diarrhea approximately 4 days ago.  Notes his family was also sick with similar illness.  Since resolution of the stomach flu, he had return of his left lower quadrant pain, most severe earlier today on his way to work.  Denies current nausea, vomiting, diarrhea.  No known fevers or chills.  No bloody stools.                          No data recorded                Patient History   Past Medical History:   Diagnosis Date    COVID-19     COVID-19    Diverticulitis     GERD (gastroesophageal reflux disease)     HTN (hypertension)      Past Surgical History:   Procedure Laterality Date    APPENDECTOMY  02/17/2015    Appendectomy     No family history on file.  Social History     Tobacco Use    Smoking status: Not on file    Smokeless tobacco: Not on file   Substance Use Topics    Alcohol use: Not on file    Drug use: Not on file       Physical Exam   ED Triage Vitals [11/28/23 0939]   Temp Heart Rate Resp BP   36.5 °C (97.7 °F) 73 18 (!) 140/92      SpO2 Temp Source Heart Rate Source Patient Position   100 % Oral Monitor --      BP Location FiO2 (%)     Right arm --       Physical Exam  Vitals and nursing note reviewed.   Constitutional:       General: He is not in acute distress.     Appearance: He is not ill-appearing.   HENT:      Head: Normocephalic and atraumatic.       Mouth/Throat:      Mouth: Mucous membranes are moist.      Pharynx: Oropharynx is clear.   Eyes:      Extraocular Movements: Extraocular movements intact.      Conjunctiva/sclera: Conjunctivae normal.      Pupils: Pupils are equal, round, and reactive to light.   Cardiovascular:      Rate and Rhythm: Normal rate and regular rhythm.   Pulmonary:      Effort: Pulmonary effort is normal. No respiratory distress.      Breath sounds: Normal breath sounds.   Abdominal:      General: There is no distension.      Palpations: Abdomen is soft.      Tenderness: There is abdominal tenderness in the left lower quadrant.   Musculoskeletal:         General: No swelling or deformity. Normal range of motion.      Cervical back: Normal range of motion and neck supple.   Skin:     General: Skin is warm and dry.      Capillary Refill: Capillary refill takes less than 2 seconds.   Neurological:      General: No focal deficit present.      Mental Status: He is alert and oriented to person, place, and time. Mental status is at baseline.   Psychiatric:         Mood and Affect: Mood normal.         Behavior: Behavior normal.         ED Course & MDM   ED Course as of 11/28/23 1315   e Nov 28, 2023   1054 WBC(!): 14.3 []   1054 POTASSIUM(!): 3.3  Will supplement oral []      ED Course User Index  [] Elida Pritchett MD         Diagnoses as of 11/28/23 1315   Abdominal pain   Diverticulitis of large intestine with abscess without bleeding   Hypokalemia       Medical Decision Making  53 y.o. male presents with lower abdominal pain.  Considered appendicitis, diverticulitis, volvulus, mesenteric ischemia, AAA, small bowel obstruction, nephrolithiasis, UTI,  testicular torsion.  Patient noted to have leukocytosis of 14.  Mild hypokalemia, given oral supplementation.  CT of the abdomen shows diverticulitis of the sigmoid colon with associated abscess.  Spoke with general surgery who recommends admission to medicine with general  surgery and interventional radiology following.  Patient mated to hospitalist for further management.  Given IV Unasyn prior to admission.     External Records Reviewed: I reviewed recent and relevant outside records including: Previous notes, inpatient records, previous Labs, previous radiology     Diagnostic testing considered: US/XR but not indicated at this time          Procedure  Procedures     Elida Pritchett MD  11/28/23 [id number]       Elida Pritchett MD  11/28/23 [id number]

## 2023-11-28 NOTE — Clinical Note
Pt ambulated to ct table, supine, secured all vss at this time, placed on cardiac monitor- SR, 3L O2 NC on for sedation.

## 2023-11-28 NOTE — Clinical Note
After reviewing previous and current scans Dr. Reveles determined abscess appeared improved and procedure was cancelled, pt back supine on cart w/ hob elevated and SR up x 2, continues to deny abd pain

## 2023-11-28 NOTE — CARE PLAN
The patient's goals for the shift include  control pain    The clinical goals for the shift include control pain    Over the shift, the patient did not make progress toward the following goals. Barriers to progression include . Recommendations to address these barriers include .

## 2023-11-29 LAB
ALBUMIN SERPL-MCNC: 3.4 G/DL (ref 3.5–5)
ALP BLD-CCNC: 69 U/L (ref 35–125)
ALT SERPL-CCNC: 30 U/L (ref 5–40)
ANION GAP SERPL CALC-SCNC: 10 MMOL/L
AST SERPL-CCNC: 15 U/L (ref 5–40)
BILIRUB SERPL-MCNC: 0.7 MG/DL (ref 0.1–1.2)
BUN SERPL-MCNC: 10 MG/DL (ref 8–25)
CALCIUM SERPL-MCNC: 8.6 MG/DL (ref 8.5–10.4)
CHLORIDE SERPL-SCNC: 101 MMOL/L (ref 97–107)
CO2 SERPL-SCNC: 27 MMOL/L (ref 24–31)
CREAT SERPL-MCNC: 1.1 MG/DL (ref 0.4–1.6)
ERYTHROCYTE [DISTWIDTH] IN BLOOD BY AUTOMATED COUNT: 11.9 % (ref 11.5–14.5)
GFR SERPL CREATININE-BSD FRML MDRD: 79 ML/MIN/1.73M*2
GLUCOSE SERPL-MCNC: 109 MG/DL (ref 65–99)
HCT VFR BLD AUTO: 41.5 % (ref 41–52)
HGB BLD-MCNC: 14.5 G/DL (ref 13.5–17.5)
MCH RBC QN AUTO: 32.3 PG (ref 26–34)
MCHC RBC AUTO-ENTMCNC: 34.9 G/DL (ref 32–36)
MCV RBC AUTO: 92 FL (ref 80–100)
NRBC BLD-RTO: 0 /100 WBCS (ref 0–0)
PLATELET # BLD AUTO: 238 X10*3/UL (ref 150–450)
POTASSIUM SERPL-SCNC: 3.4 MMOL/L (ref 3.4–5.1)
PROT SERPL-MCNC: 6.7 G/DL (ref 5.9–7.9)
RBC # BLD AUTO: 4.49 X10*6/UL (ref 4.5–5.9)
SODIUM SERPL-SCNC: 138 MMOL/L (ref 133–145)
WBC # BLD AUTO: 6.5 X10*3/UL (ref 4.4–11.3)

## 2023-11-29 PROCEDURE — 80053 COMPREHEN METABOLIC PANEL: CPT | Performed by: NURSE PRACTITIONER

## 2023-11-29 PROCEDURE — 36415 COLL VENOUS BLD VENIPUNCTURE: CPT | Performed by: NURSE PRACTITIONER

## 2023-11-29 PROCEDURE — 85027 COMPLETE CBC AUTOMATED: CPT | Performed by: NURSE PRACTITIONER

## 2023-11-29 PROCEDURE — 1100000001 HC PRIVATE ROOM DAILY

## 2023-11-29 PROCEDURE — 94760 N-INVAS EAR/PLS OXIMETRY 1: CPT

## 2023-11-29 PROCEDURE — 2500000004 HC RX 250 GENERAL PHARMACY W/ HCPCS (ALT 636 FOR OP/ED): Performed by: NURSE PRACTITIONER

## 2023-11-29 RX ADMIN — ACETAMINOPHEN 650 MG: 325 TABLET ORAL at 21:09

## 2023-11-29 RX ADMIN — PIPERACILLIN SODIUM AND TAZOBACTAM SODIUM 3.38 G: 3; .375 INJECTION, SOLUTION INTRAVENOUS at 09:10

## 2023-11-29 RX ADMIN — PANTOPRAZOLE SODIUM 40 MG: 40 TABLET, DELAYED RELEASE ORAL at 06:43

## 2023-11-29 RX ADMIN — PIPERACILLIN SODIUM AND TAZOBACTAM SODIUM 3.38 G: 3; .375 INJECTION, SOLUTION INTRAVENOUS at 14:10

## 2023-11-29 RX ADMIN — PIPERACILLIN SODIUM AND TAZOBACTAM SODIUM 3.38 G: 3; .375 INJECTION, SOLUTION INTRAVENOUS at 21:08

## 2023-11-29 RX ADMIN — SODIUM CHLORIDE 100 ML/HR: 900 INJECTION, SOLUTION INTRAVENOUS at 04:59

## 2023-11-29 RX ADMIN — PIPERACILLIN SODIUM AND TAZOBACTAM SODIUM 3.38 G: 3; .375 INJECTION, SOLUTION INTRAVENOUS at 03:10

## 2023-11-29 ASSESSMENT — COGNITIVE AND FUNCTIONAL STATUS - GENERAL
MOBILITY SCORE: 24
DAILY ACTIVITIY SCORE: 24

## 2023-11-29 ASSESSMENT — PAIN DESCRIPTION - LOCATION: LOCATION: BACK

## 2023-11-29 ASSESSMENT — PAIN SCALES - GENERAL
PAINLEVEL_OUTOF10: 0 - NO PAIN
PAINLEVEL_OUTOF10: 2
PAINLEVEL_OUTOF10: 0 - NO PAIN

## 2023-11-29 ASSESSMENT — PAIN - FUNCTIONAL ASSESSMENT
PAIN_FUNCTIONAL_ASSESSMENT: 0-10
PAIN_FUNCTIONAL_ASSESSMENT: 0-10

## 2023-11-29 NOTE — PROGRESS NOTES
"Tom Treviño is a 53 y.o. male on day 1 of admission presenting with diverticulitis with abscess.    Subjective   Feels pretty good and better than yesterday.  No abdominal pain since seen yesterday but he can still \"feel it's there\" only in his left lower quadrant that is nothing like it was yesterday and currently rates it as 0.  Taking his clear liquid diet good without it causing him any abdominal pain, nausea or vomiting since seen yesterday.  Passing gas.  Since seen yesterday had 3 bowel movements that were soft without blood that the first 1 was small, second 1 was medium, and the third 1 was small.  No chills.  Does not feel full or bloated.  Feels hungry like he could eat more than just the clear liquids that he is currently on.  Getting out of bed.  Said went down to the IR yesterday and had another CAT scan that the patient says the IR said his abscess was improving and recommended not to drain it at present.    Objective     Vital signs in last 24 hours:  Temp:  [36.4 °C (97.5 °F)-36.8 °C (98.2 °F)] 36.4 °C (97.5 °F)  Heart Rate:  [59-75] 65  Resp:  [16-20] 18  BP: (113-153)/(68-91) 130/83  Heart Rate:  [59-75]   Temp:  [36.4 °C (97.5 °F)-36.8 °C (98.2 °F)]   Resp:  [16-20]   BP: (113-153)/(68-91)   SpO2:  [95 %-99 %]      Intake/Output last 3 Shifts:  I/O last 3 completed shifts:  In: 1423.3 (11.8 mL/kg) [I.V.:1173.3 (9.7 mL/kg); IV Piggyback:250]  Out: - (0 mL/kg)   Weight: 120.8 kg     Physical Exam  No acute distress  Not septic appearing  Looks fine and comfortable  Alert and oriented  Heart regular  Lungs clear  No edema  Abdomen soft, positive bowel sounds, nondistended, nontender, benign umbilical hernia  Seen initially by myself at 0945 and then again concurrently with surgeon at 1006    Scheduled medications  [Held by provider] hydroCHLOROthiazide, 25 mg, oral, Daily  pantoprazole, 40 mg, oral, Daily before breakfast   Or  pantoprazole, 40 mg, intravenous, Daily before " breakfast  piperacillin-tazobactam, 3.375 g, intravenous, q6h      Continuous medications  sodium chloride 0.9%, 100 mL/hr, Last Rate: 100 mL/hr (11/29/23 1044)      PRN medications  PRN medications: acetaminophen **OR** acetaminophen **OR** acetaminophen, hydrALAZINE, morphine, ondansetron ODT **OR** ondansetron, traMADol    Relevant Results  Results from last 7 days   Lab Units 11/29/23  0424 11/28/23  0943   WBC AUTO x10*3/uL 6.5 14.3*   HEMOGLOBIN g/dL 14.5 16.1   HEMATOCRIT % 41.5 46.0   PLATELETS AUTO x10*3/uL 238 287      Results from last 7 days   Lab Units 11/29/23  0424 11/28/23  0943   SODIUM mmol/L 138 135   POTASSIUM mmol/L 3.4 3.3*   CHLORIDE mmol/L 101 96*   CO2 mmol/L 27 28   BUN mg/dL 10 12   CREATININE mg/dL 1.10 1.10   GLUCOSE mg/dL 109* 137*   CALCIUM mg/dL 8.6 9.0      CT abdomen pelvis w IV contrast  Result Date: 11/28/2023  Interpreted By:  Richard Gilman, STUDY: CT ABDOMEN PELVIS W IV CONTRAST; 11/28/2023 10:22 am   INDICATION: Signs/Symptoms:hx of diverticulitis, left lower quadrant pain;   COMPARISON: 07/10/2020   ACCESSION NUMBER(S): [id number]   ORDERING CLINICIAN: KAVON RIZZO   TECHNIQUE: Contiguous axial images of the abdomen/pelvis were performed with IV contrast. 75 ml of Omnipaque 350 was utilized. Coronal and sagittal reformatted images were also obtained. All CT examinations are performed with 1 or more of the following dose reduction techniques: Automated exposure control, adjustment of mA and/or kv according to patient's size, or use of iterative reconstruction techniques.     FINDINGS: The liver, gallbladder,  common bile duct, pancreas, spleen, and adrenal glands are unremarkable.   The kidneys enhance symmetrically. No urolithiasis is seen. No hydroureteronephrosis is seen.   The visualized aorta is unremarkable.   The small bowel is not dilated. The appendix is not visualized. There is acute diverticulitis at the mid-distal sigmoid colon. There is abscess  formation along the serosal surface width fluid and gas formation. The abscess measures approximately 4.4 x 3.4 cm in maximum dimensions.   The bladder is normally distended with no gross wall thickening.   The visualized osseous structures are intact.   Limited images of the lower thorax are unremarkable.        acute diverticulitis at the mid-distal sigmoid colon. There is abscess formation along the serosal surface width fluid and gas formation. The abscess measures approximately 4.4 x 3.4 cm in maximum dimensions.   Signed by: Richard Gilman 11/28/2023 11:18 AM Dictation workstation:   [id number]      Assessment/Plan   Diverticulitis of intestine with abscess  Stable and improving  On clears via IM-advance diet as tolerated to soft low fiber  IV fluids per IM  On Zosyn via IM-antibiotics per IM  IR consulted 11/28 for drainage-per patient IR did not recommend drainage 11/28-I don't see any IR notes  Morning labs ordered  Discussed with patient if he gets better without surgery then he needs to have a colonoscopy in 6 to 8 weeks down the road to make sure that this is only diverticulitis and not a perforated colon cancer  No acute surgical indications at present  Will follow  Will probably be okay for discharge tomorrow after we see him    Surgeon discussed with patient that upon discharge:  1-soft low fiber low residue diet for 2 weeks  2-avoid constipation:  -drink 8 glasses of water per day  -after 2 weeks take Metamucil daily  3-follow-up with his GI to have elective colonoscopy in 6 to 8 weeks as above  4-follow-up with a colorectal surgeon after has colonoscopy to have an elective colon resection  5-no nuts or seeds     Umbilical hernia  Benign  No acute surgical indications for at present     DVT prophylaxis  Per IM      Mai Wells, APRN-CNP

## 2023-11-29 NOTE — NURSING NOTE
Assumed care of patient, pt awake and alert in bed with no complaints at this time, call light and possessions within reach

## 2023-11-29 NOTE — PROGRESS NOTES
"Tom Treviño is a 53 y.o. male on day 1 of admission presenting with No Principal Problem: There is no principal problem currently on the Problem List. Please update the Problem List and refresh..    Subjective   During nursing rounds, RN informed TCSW pt has no new updates.        Objective     Physical Exam    Last Recorded Vitals  Blood pressure 138/88, pulse 67, temperature 36.8 °C (98.2 °F), temperature source Oral, resp. rate 18, height 1.88 m (6' 2\"), weight 121 kg (266 lb 5.1 oz), SpO2 97 %.  Intake/Output last 3 Shifts:  I/O last 3 completed shifts:  In: 1423.3 (11.8 mL/kg) [I.V.:1173.3 (9.7 mL/kg); IV Piggyback:250]  Out: - (0 mL/kg)   Weight: 120.8 kg     Relevant Results                             Assessment/Plan   Active Problems:    Diverticulitis of intestine with abscess               SADIA Brasher      "

## 2023-11-29 NOTE — CARE PLAN
The patient's goals for the shift include monitor pain     The clinical goals for the shift include monitor pain

## 2023-11-29 NOTE — PROGRESS NOTES
Tom Treviño is a 53 y.o. male on day 1 of admission presenting with No Principal Problem: There is no principal problem currently on the Problem List. Please update the Problem List and refresh..      Subjective   Patient seen and examined. Resting in bed, spouse at the bedside. States he is feeling better today. Less pain. No nausea. He is having liquid stool, no blood. Afebrile.        Objective     Last Recorded Vitals  /83 (BP Location: Left arm, Patient Position: Lying)   Pulse 65   Temp 36.4 °C (97.5 °F) (Oral)   Resp 18   Wt 121 kg (266 lb 5.1 oz)   SpO2 98%   Intake/Output last 3 Shifts:    Intake/Output Summary (Last 24 hours) at 11/29/2023 1327  Last data filed at 11/29/2023 1044  Gross per 24 hour   Intake 1898.33 ml   Output --   Net 1898.33 ml       Admission Weight  Weight: 121 kg (266 lb 5.1 oz) (11/28/23 0939)    Daily Weight  11/28/23 : 121 kg (266 lb 5.1 oz)    Image Results  CT abdomen pelvis w IV contrast  Narrative: Interpreted By:  Richard Gilman,   STUDY:  CT ABDOMEN PELVIS W IV CONTRAST; 11/28/2023 10:22 am      INDICATION:  Signs/Symptoms:hx of diverticulitis, left lower quadrant pain;      COMPARISON:  07/10/2020      ACCESSION NUMBER(S):  [id number]      ORDERING CLINICIAN:  KAVON RIZZO      TECHNIQUE:  Contiguous axial images of the abdomen/pelvis were performed with IV  contrast. 75 ml of Omnipaque 350 was utilized. Coronal and sagittal  reformatted images were also obtained. All CT examinations are  performed with 1 or more of the following dose reduction techniques:  Automated exposure control, adjustment of mA and/or kv according to  patient's size, or use of iterative reconstruction techniques.          FINDINGS:  The liver, gallbladder,  common bile duct, pancreas, spleen, and  adrenal glands are unremarkable.      The kidneys enhance symmetrically. No urolithiasis is seen. No  hydroureteronephrosis is seen.      The visualized aorta is unremarkable.       The small bowel is not dilated. The appendix is not visualized. There  is acute diverticulitis at the mid-distal sigmoid colon. There is  abscess formation along the serosal surface width fluid and gas  formation. The abscess measures approximately 4.4 x 3.4 cm in maximum  dimensions.      The bladder is normally distended with no gross wall thickening.      The visualized osseous structures are intact.      Limited images of the lower thorax are unremarkable.      Impression: acute diverticulitis at the mid-distal sigmoid colon. There is  abscess formation along the serosal surface width fluid and gas  formation. The abscess measures approximately 4.4 x 3.4 cm in maximum  dimensions.      Signed by: Richard Gilman 11/28/2023 11:18 AM  Dictation workstation:   [id number]      Physical Exam    General: Alert and oriented x3, pleasant.   Cardiac: Regular rate and rhythm, S1/S2 , no murmur.   Pulmonary: Clear to auscultation on room air.   Abdomen: Soft, round, mild tenderness in the LLQ. BS +x4.   Extremities: No edema.  Skin: No rashes or lesions.      Relevant Results    Scheduled medications  [Held by provider] hydroCHLOROthiazide, 25 mg, oral, Daily  pantoprazole, 40 mg, oral, Daily before breakfast   Or  pantoprazole, 40 mg, intravenous, Daily before breakfast  piperacillin-tazobactam, 3.375 g, intravenous, q6h      Continuous medications  sodium chloride 0.9%, 100 mL/hr, Last Rate: 100 mL/hr (11/29/23 1044)      PRN medications  PRN medications: acetaminophen **OR** acetaminophen **OR** acetaminophen, hydrALAZINE, morphine, ondansetron ODT **OR** ondansetron, traMADol     Results for orders placed or performed during the hospital encounter of 11/28/23 (from the past 24 hour(s))   CBC   Result Value Ref Range    WBC 6.5 4.4 - 11.3 x10*3/uL    nRBC 0.0 0.0 - 0.0 /100 WBCs    RBC 4.49 (L) 4.50 - 5.90 x10*6/uL    Hemoglobin 14.5 13.5 - 17.5 g/dL    Hematocrit 41.5 41.0 - 52.0 %    MCV 92 80 - 100 fL    MCH  32.3 26.0 - 34.0 pg    MCHC 34.9 32.0 - 36.0 g/dL    RDW 11.9 11.5 - 14.5 %    Platelets 238 150 - 450 x10*3/uL   Comprehensive metabolic panel   Result Value Ref Range    Glucose 109 (H) 65 - 99 mg/dL    Sodium 138 133 - 145 mmol/L    Potassium 3.4 3.4 - 5.1 mmol/L    Chloride 101 97 - 107 mmol/L    Bicarbonate 27 24 - 31 mmol/L    Urea Nitrogen 10 8 - 25 mg/dL    Creatinine 1.10 0.40 - 1.60 mg/dL    eGFR 79 >60 mL/min/1.73m*2    Calcium 8.6 8.5 - 10.4 mg/dL    Albumin 3.4 (L) 3.5 - 5.0 g/dL    Alkaline Phosphatase 69 35 - 125 U/L    Total Protein 6.7 5.9 - 7.9 g/dL    AST 15 5 - 40 U/L    Bilirubin, Total 0.7 0.1 - 1.2 mg/dL    ALT 30 5 - 40 U/L    Anion Gap 10 <=19 mmol/L          Assessment/Plan      Acute Diverticulitis with Abscess / Abdominal Pain  -General Surgery follows. No current surgical intervention recommended.   -IR took down yesterday for attempt to drain, states already was looking improved on imaging and due to the size they decided not to drain. Conservative management.   -CT abd/pelvis shows acute diverticulitis mid distal sigmoid colon with abscess formation with fluid and gas 4.4 cm x 3.4 cm.   -On clears, will change to full liquids, advance as tolerated to soft, low fiber diet.   -IV zosyn, continue.   -IVF hydration, will stop today as he has no nausea and is drinking well.    -Pain control.   -He follows outpatient with GI Dr. Cerrato, was scheduled for scope on Monday which is currently on hold. Will need scope in 6-8 weeks.      Leukocytosis  -Secondary to #1.   -Monitor, already improved, down to 6.5 this AM.     HTN  -On hydrochlorothiazide at home, holding for now with need for hydration. Will likely resume in AM.   -PRN hydralazine.   -Monitor.      GERD  -PPI.      DVT Risk  -Holding off on pharm agent at this time in case drainage of abscess collection needed.   -SCDs. He has been up and ambulating frequently.      Plan  General surgery follows.   IR saw and no plans for IR  drainage.   Continue IV Zosyn.   Change to full liquids, advance as tolerated.   Pain control.   Anticipate DC tomorrow if pain is controlled and he is tolerating diet.           ROWDY Weber-CNP

## 2023-11-29 NOTE — CARE PLAN
Problem: Pain - Adult  Goal: Verbalizes/displays adequate comfort level or baseline comfort level  Outcome: Progressing     Problem: Safety - Adult  Goal: Free from fall injury  Outcome: Progressing     Problem: Discharge Planning  Goal: Discharge to home or other facility with appropriate resources  Outcome: Progressing     Problem: Chronic Conditions and Co-morbidities  Goal: Patient's chronic conditions and co-morbidity symptoms are monitored and maintained or improved  Outcome: Progressing

## 2023-11-30 ENCOUNTER — PHARMACY VISIT (OUTPATIENT)
Dept: PHARMACY | Facility: CLINIC | Age: 53
End: 2023-11-30
Payer: MEDICARE

## 2023-11-30 VITALS
RESPIRATION RATE: 18 BRPM | DIASTOLIC BLOOD PRESSURE: 80 MMHG | OXYGEN SATURATION: 97 % | TEMPERATURE: 97.9 F | HEART RATE: 65 BPM | SYSTOLIC BLOOD PRESSURE: 139 MMHG | WEIGHT: 266.32 LBS | BODY MASS INDEX: 34.18 KG/M2 | HEIGHT: 74 IN

## 2023-11-30 LAB
ANION GAP SERPL CALC-SCNC: 9 MMOL/L
BASOPHILS # BLD AUTO: 0.03 X10*3/UL (ref 0–0.1)
BASOPHILS NFR BLD AUTO: 0.5 %
BUN SERPL-MCNC: 6 MG/DL (ref 8–25)
CALCIUM SERPL-MCNC: 8.9 MG/DL (ref 8.5–10.4)
CHLORIDE SERPL-SCNC: 105 MMOL/L (ref 97–107)
CO2 SERPL-SCNC: 28 MMOL/L (ref 24–31)
CREAT SERPL-MCNC: 1.2 MG/DL (ref 0.4–1.6)
EOSINOPHIL # BLD AUTO: 0.12 X10*3/UL (ref 0–0.7)
EOSINOPHIL NFR BLD AUTO: 1.9 %
ERYTHROCYTE [DISTWIDTH] IN BLOOD BY AUTOMATED COUNT: 11.9 % (ref 11.5–14.5)
GFR SERPL CREATININE-BSD FRML MDRD: 71 ML/MIN/1.73M*2
GLUCOSE SERPL-MCNC: 101 MG/DL (ref 65–99)
HCT VFR BLD AUTO: 42.2 % (ref 41–52)
HGB BLD-MCNC: 14.6 G/DL (ref 13.5–17.5)
IMM GRANULOCYTES # BLD AUTO: 0.03 X10*3/UL (ref 0–0.7)
IMM GRANULOCYTES NFR BLD AUTO: 0.5 % (ref 0–0.9)
LYMPHOCYTES # BLD AUTO: 2.51 X10*3/UL (ref 1.2–4.8)
LYMPHOCYTES NFR BLD AUTO: 40 %
MCH RBC QN AUTO: 32.3 PG (ref 26–34)
MCHC RBC AUTO-ENTMCNC: 34.6 G/DL (ref 32–36)
MCV RBC AUTO: 93 FL (ref 80–100)
MONOCYTES # BLD AUTO: 0.53 X10*3/UL (ref 0.1–1)
MONOCYTES NFR BLD AUTO: 8.5 %
NEUTROPHILS # BLD AUTO: 3.05 X10*3/UL (ref 1.2–7.7)
NEUTROPHILS NFR BLD AUTO: 48.6 %
NRBC BLD-RTO: 0 /100 WBCS (ref 0–0)
PLATELET # BLD AUTO: 253 X10*3/UL (ref 150–450)
POTASSIUM SERPL-SCNC: 3.3 MMOL/L (ref 3.4–5.1)
RBC # BLD AUTO: 4.52 X10*6/UL (ref 4.5–5.9)
SODIUM SERPL-SCNC: 142 MMOL/L (ref 133–145)
WBC # BLD AUTO: 6.3 X10*3/UL (ref 4.4–11.3)

## 2023-11-30 PROCEDURE — 36415 COLL VENOUS BLD VENIPUNCTURE: CPT | Performed by: NURSE PRACTITIONER

## 2023-11-30 PROCEDURE — 2500000001 HC RX 250 WO HCPCS SELF ADMINISTERED DRUGS (ALT 637 FOR MEDICARE OP): Performed by: NURSE PRACTITIONER

## 2023-11-30 PROCEDURE — 80048 BASIC METABOLIC PNL TOTAL CA: CPT | Performed by: NURSE PRACTITIONER

## 2023-11-30 PROCEDURE — 2500000004 HC RX 250 GENERAL PHARMACY W/ HCPCS (ALT 636 FOR OP/ED): Performed by: NURSE PRACTITIONER

## 2023-11-30 PROCEDURE — RXMED WILLOW AMBULATORY MEDICATION CHARGE

## 2023-11-30 PROCEDURE — 85025 COMPLETE CBC W/AUTO DIFF WBC: CPT | Performed by: NURSE PRACTITIONER

## 2023-11-30 RX ORDER — AMOXICILLIN AND CLAVULANATE POTASSIUM 875; 125 MG/1; MG/1
1 TABLET, FILM COATED ORAL 2 TIMES DAILY
Qty: 24 TABLET | Refills: 0 | Status: SHIPPED | OUTPATIENT
Start: 2023-11-30 | End: 2023-12-16 | Stop reason: HOSPADM

## 2023-11-30 RX ORDER — POTASSIUM CHLORIDE 20 MEQ/1
20 TABLET, EXTENDED RELEASE ORAL ONCE
Status: COMPLETED | OUTPATIENT
Start: 2023-11-30 | End: 2023-11-30

## 2023-11-30 RX ADMIN — PANTOPRAZOLE SODIUM 40 MG: 40 TABLET, DELAYED RELEASE ORAL at 06:16

## 2023-11-30 RX ADMIN — PIPERACILLIN SODIUM AND TAZOBACTAM SODIUM 3.38 G: 3; .375 INJECTION, SOLUTION INTRAVENOUS at 08:20

## 2023-11-30 RX ADMIN — POTASSIUM CHLORIDE 20 MEQ: 1500 TABLET, EXTENDED RELEASE ORAL at 11:40

## 2023-11-30 RX ADMIN — PIPERACILLIN SODIUM AND TAZOBACTAM SODIUM 3.38 G: 3; .375 INJECTION, SOLUTION INTRAVENOUS at 03:11

## 2023-11-30 RX ADMIN — HYDROCHLOROTHIAZIDE 25 MG: 25 TABLET ORAL at 09:00

## 2023-11-30 NOTE — PROGRESS NOTES
Tom Treviño is a 53 y.o. male on day 2 of admission presenting with diverticulitis with abscess.    Subjective   Feels good and better.  Taking his low fiber diet good without any abdominal pain, nausea or vomiting since seen yesterday.  Only has a little bit of only left lower quadrant discomfort only prior to when he has to move his bowels which is then relieved after moving his bowels.  Passing gas.  Had approximately 5 bowel movements since seen yesterday without blood.  No chills.  Feels well enough for discharge today.  Says surgeon already saw him this morning and stated he is okay for discharge from her standpoint.    Objective     Vital signs in last 24 hours:  Temp:  [36.3 °C (97.3 °F)-36.8 °C (98.2 °F)] 36.3 °C (97.3 °F)  Heart Rate:  [62-67] 66  Resp:  [17-18] 17  BP: (122-145)/(69-93) 138/92  Heart Rate:  [62-67]   Temp:  [36.3 °C (97.3 °F)-36.8 °C (98.2 °F)]   Resp:  [17-18]   BP: (122-145)/(69-93)   SpO2:  [95 %-98 %]      Intake/Output last 3 Shifts:  I/O last 3 completed shifts:  In: 1898.3 (15.7 mL/kg) [I.V.:1748.3 (14.5 mL/kg); IV Piggyback:150]  Out: - (0 mL/kg)   Weight: 120.8 kg     Physical Exam  No acute distress  Not septic appearing  Looks fine and comfortable  Alert and oriented  Heart regular  Lungs clear  No edema  Abdomen soft, positive bowel sounds, nondistended, nontender, benign umbilical hernia    Scheduled medications  hydroCHLOROthiazide, 25 mg, oral, Daily  pantoprazole, 40 mg, oral, Daily before breakfast   Or  pantoprazole, 40 mg, intravenous, Daily before breakfast  piperacillin-tazobactam, 3.375 g, intravenous, q6h      Continuous medications     PRN medications  PRN medications: acetaminophen **OR** acetaminophen **OR** acetaminophen, hydrALAZINE, morphine, ondansetron ODT **OR** ondansetron, traMADol    Relevant Results  Results from last 7 days   Lab Units 11/30/23  0442 11/29/23  0424 11/28/23  0943   WBC AUTO x10*3/uL 6.3 6.5 14.3*   HEMOGLOBIN g/dL 14.6 14.5 16.1    HEMATOCRIT % 42.2 41.5 46.0   PLATELETS AUTO x10*3/uL 253 238 287      Results from last 7 days   Lab Units 11/30/23  0442 11/29/23  0424 11/28/23  0943   SODIUM mmol/L 142 138 135   POTASSIUM mmol/L 3.3* 3.4 3.3*   CHLORIDE mmol/L 105 101 96*   CO2 mmol/L 28 27 28   BUN mg/dL 6* 10 12   CREATININE mg/dL 1.20 1.10 1.10   GLUCOSE mg/dL 101* 109* 137*   CALCIUM mg/dL 8.9 8.6 9.0      CT abdomen pelvis w IV contrast  Result Date: 11/28/2023  Interpreted By:  Richard Gilman, STUDY: CT ABDOMEN PELVIS W IV CONTRAST; 11/28/2023 10:22 am   INDICATION: Signs/Symptoms:hx of diverticulitis, left lower quadrant pain;   COMPARISON: 07/10/2020   ACCESSION NUMBER(S): [id number]   ORDERING CLINICIAN: KAVON RIZZO   TECHNIQUE: Contiguous axial images of the abdomen/pelvis were performed with IV contrast. 75 ml of Omnipaque 350 was utilized. Coronal and sagittal reformatted images were also obtained. All CT examinations are performed with 1 or more of the following dose reduction techniques: Automated exposure control, adjustment of mA and/or kv according to patient's size, or use of iterative reconstruction techniques.     FINDINGS: The liver, gallbladder,  common bile duct, pancreas, spleen, and adrenal glands are unremarkable.   The kidneys enhance symmetrically. No urolithiasis is seen. No hydroureteronephrosis is seen.   The visualized aorta is unremarkable.   The small bowel is not dilated. The appendix is not visualized. There is acute diverticulitis at the mid-distal sigmoid colon. There is abscess formation along the serosal surface width fluid and gas formation. The abscess measures approximately 4.4 x 3.4 cm in maximum dimensions.   The bladder is normally distended with no gross wall thickening.   The visualized osseous structures are intact.   Limited images of the lower thorax are unremarkable.        acute diverticulitis at the mid-distal sigmoid colon. There is abscess formation along the serosal  "surface width fluid and gas formation. The abscess measures approximately 4.4 x 3.4 cm in maximum dimensions.   Signed by: Richard Gilman 11/28/2023 11:18 AM Dictation workstation:   [id number]    Assessment/Plan   Diverticulitis of intestine with abscess  Stable and improving  On soft low fiber diet  On Zosyn via IM-antibiotics per IM  IR consulted 11/28 for drainage-per patient IR did not recommend drainage 11/28-I don't see any IR notes-per last IM note from 11/29 states \"IR took down yesterday for attempt to drain, states already was looking improved on imaging and due to the size they decided not to drain. Conservative management\"  Discussed with patient he needs to have a colonoscopy in 6 to 8 weeks to make sure that this is only diverticulitis and not a perforated colon cancer  No acute surgical indications at present  Okay for discharge from general surgery standpoint  Follow-up with Dr. Sandy pickard     Surgeon discussed yesterday with patient that upon discharge:  1-soft low fiber low residue diet for 2 weeks  2-avoid constipation:  -drink 8 glasses of water per day  -after 2 weeks take Metamucil daily  3-follow-up with his GI to have elective colonoscopy in 6 to 8 weeks as above  4-follow-up with a colorectal surgeon (Dr Ayse Persaud or Dr Paras Nunez) after has colonoscopy to have an elective colon resection  5-no nuts or seeds     Umbilical hernia  Benign  No acute surgical indications for at present     DVT prophylaxis  Per IM         Mai Wells, APRN-CNP            "

## 2023-11-30 NOTE — CARE PLAN
The patient's goals for the shift include      The clinical goals for the shift include control pain    Problem: Pain - Adult  Goal: Verbalizes/displays adequate comfort level or baseline comfort level  Outcome: Progressing     Problem: Safety - Adult  Goal: Free from fall injury  Outcome: Progressing     Problem: Discharge Planning  Goal: Discharge to home or other facility with appropriate resources  Outcome: Progressing     Problem: Chronic Conditions and Co-morbidities  Goal: Patient's chronic conditions and co-morbidity symptoms are monitored and maintained or improved  Outcome: Progressing

## 2023-11-30 NOTE — PROGRESS NOTES
"Tom Treviño is a 53 y.o. male on day 2 of admission presenting with No Principal Problem: There is no principal problem currently on the Problem List. Please update the Problem List and refresh..    Subjective   Pt anticipates to discharge home today with PO ATBX. Pt will have no skilled needs at discharge.        Objective     Physical Exam    Last Recorded Vitals  Blood pressure (!) 138/92, pulse 66, temperature 36.3 °C (97.3 °F), temperature source Oral, resp. rate 17, height 1.88 m (6' 2\"), weight 121 kg (266 lb 5.1 oz), SpO2 98 %.  Intake/Output last 3 Shifts:  I/O last 3 completed shifts:  In: 1898.3 (15.7 mL/kg) [I.V.:1748.3 (14.5 mL/kg); IV Piggyback:150]  Out: - (0 mL/kg)   Weight: 120.8 kg     Relevant Results                             Assessment/Plan   Active Problems:    Diverticulitis of intestine with abscess               SADIA Brasher      "

## 2023-11-30 NOTE — NURSING NOTE
This RN reviewed discharge paperwork with patient. Patient medically cleared for discharge home, patient to drive self home.

## 2023-11-30 NOTE — DISCHARGE SUMMARY
Discharge Diagnosis  Acute diverticulitis with abscess    Issues Requiring Follow-Up  Acute diverticulitis with abscess    Discharge Meds     Your medication list        START taking these medications        Instructions Last Dose Given Next Dose Due   amoxicillin-pot clavulanate 875-125 mg tablet  Commonly known as: Augmentin      Take 1 tablet (875 mg) by mouth 2 times a day for 12 days.              CONTINUE taking these medications        Instructions Last Dose Given Next Dose Due   hydroCHLOROthiazide 25 mg tablet  Commonly known as: HYDRODiuril      TAKE 1 TABLET BY MOUTH EVERY DAY       lansoprazole 30 mg DR capsule  Commonly known as: Prevacid                     Where to Get Your Medications        These medications were sent to [facility]  [address], [address], [place] [address]      Hours: 9 AM to 6 PM Mon-Fri, 9 AM to 1 PM Sat Phone: [phone]   amoxicillin-pot clavulanate 875-125 mg tablet         Test Results Pending At Discharge  Pending Labs       No current pending labs.            Hospital Course   Admitted for further management. Seen by IR, no drainage done due to the size of the fluid collection and opted for conservative management. Surgery also saw and followed, no surgical intervention was needed. Treated with IV antibiotics and IVF hydration, bowel rest. Symptoms improved. Diet was advanced and tolerating. Labs and VS are stable. DC home today with outpatient GI and colorectal surgery follow up.     Case and plan was discussed with patient, he is agreeable.   Case and plan was also discussed with my collaborating physician.     Time spent 35 minutes.     Pertinent Physical Exam At Time of Discharge  Physical Exam    General: Alert and oriented x3, pleasant.   Cardiac: Regular rate and rhythm, S1/S2 , no murmur.   Pulmonary: Clear to auscultation on room air.   Abdomen: Soft, round, nontender. BS +x4.   Extremities: No edema.  Skin: No rashes or lesions.       Outpatient Follow-Up  Future Appointments   Date Time Provider Department Center   2/19/2024  4:15 PM Murphy Lenz MD [id number]         ROWDY Weber-CNP

## 2023-12-01 ENCOUNTER — TELEPHONE (OUTPATIENT)
Dept: PRIMARY CARE | Facility: CLINIC | Age: 53
End: 2023-12-01
Payer: COMMERCIAL

## 2023-12-01 ENCOUNTER — PATIENT OUTREACH (OUTPATIENT)
Dept: PRIMARY CARE | Facility: CLINIC | Age: 53
End: 2023-12-01
Payer: COMMERCIAL

## 2023-12-01 ENCOUNTER — DOCUMENTATION (OUTPATIENT)
Dept: PRIMARY CARE | Facility: CLINIC | Age: 53
End: 2023-12-01
Payer: COMMERCIAL

## 2023-12-01 NOTE — TELEPHONE ENCOUNTER
SPOKE TO LYN.  HE HAD NO OPENINGS SO HE SAID OK TO SEE ANOTHER PROVIDER.  SCHEDULED WITH [facility] 12-4-23

## 2023-12-01 NOTE — PROGRESS NOTES
Discharge Facility: [facility]   Discharge Diagnosis: Acute diverticulitis with abscess   Admission Date: 11/28/2023   Discharge Date: 11/30/2023     PCP Appointment Date: Tasked to office   Specialist Appointment Date:  Dr. Delgado as needed, GI F/U needed, Dr. Ayse Persaud or Dr. Nunez after Colonoscopy     Hospital Encounter and Summary: Linked

## 2023-12-04 ENCOUNTER — OFFICE VISIT (OUTPATIENT)
Dept: PRIMARY CARE | Facility: CLINIC | Age: 53
End: 2023-12-04
Payer: COMMERCIAL

## 2023-12-04 VITALS
TEMPERATURE: 97.9 F | WEIGHT: 260 LBS | BODY MASS INDEX: 33.38 KG/M2 | DIASTOLIC BLOOD PRESSURE: 80 MMHG | HEART RATE: 80 BPM | SYSTOLIC BLOOD PRESSURE: 114 MMHG

## 2023-12-04 DIAGNOSIS — K57.20 DIVERTICULITIS OF LARGE INTESTINE WITH ABSCESS WITHOUT BLEEDING: Primary | ICD-10-CM

## 2023-12-04 PROCEDURE — 99212 OFFICE O/P EST SF 10 MIN: CPT | Performed by: PHYSICIAN ASSISTANT

## 2023-12-04 PROCEDURE — 1036F TOBACCO NON-USER: CPT | Performed by: PHYSICIAN ASSISTANT

## 2023-12-04 ASSESSMENT — ENCOUNTER SYMPTOMS
ANOREXIA: 0
CONSTIPATION: 0
VOMITING: 0
DIARRHEA: 0
ABDOMINAL PAIN: 1

## 2023-12-04 ASSESSMENT — PAIN SCALES - GENERAL: PAINLEVEL: 0-NO PAIN

## 2023-12-04 NOTE — PROGRESS NOTES
Subjective   Patient ID: Tom Treviño is a 53 y.o. male who presents for Follow-up (Hospital, diverticulitis. Feels he is improving. He would like to return to work tomorrow if he is able to.).    Abdominal Pain  The current episode started 1 to 4 weeks ago. The problem has been gradually improving. The pain is located in the LLQ. The quality of the pain is aching and cramping. Pertinent negatives include no anorexia, constipation, diarrhea or vomiting. Improvement on treatment: Patient was recently in the hospital for diverticulitis with abscess.  Currently on Augmentin and is feeling better.    He has an appointment with a surgeon along with his gastroenterologist for his follow-up colonoscopy.    Review of Systems   Gastrointestinal:  Positive for abdominal pain. Negative for anorexia, constipation, diarrhea and vomiting.       Objective   /80   Pulse 80   Temp 36.6 °C (97.9 °F)   Wt 118 kg (260 lb)   BMI 33.38 kg/m²     Physical Exam  Constitutional:       Appearance: He is not ill-appearing.   Cardiovascular:      Rate and Rhythm: Normal rate.      Pulses: Normal pulses.   Pulmonary:      Effort: Pulmonary effort is normal.   Abdominal:      General: Abdomen is flat.      Tenderness: There is abdominal tenderness. There is no guarding or rebound.      Hernia: A hernia is present.   Skin:     General: Skin is warm.      Coloration: Skin is not jaundiced.   Neurological:      General: No focal deficit present.      Mental Status: He is alert. Mental status is at baseline.         Assessment/Plan   Diagnoses and all orders for this visit:  Diverticulitis of large intestine with abscess without bleeding    Patient is doing much better.  Continue antibiotics.  Did suggest probiotic also.  He will return back to work tomorrow but will monitor his diet as well.

## 2023-12-04 NOTE — LETTER
December 4, 2023     Patient: Tom Treviño   YOB: 1970   Date of Visit: 12/4/2023       To Whom It May Concern:    Tom Treviño was seen in my clinic on 12/4/2023 at 11:15 am. Please excuse Tom for his absence from work on this day to make the appointment. He will return to work on 12/5/2023.    If you have any questions or concerns, please don't hesitate to call.         Sincerely,         Malia Colindres PA-C        CC: No Recipients

## 2023-12-10 ENCOUNTER — HOSPITAL ENCOUNTER (INPATIENT)
Facility: HOSPITAL | Age: 53
LOS: 5 days | Discharge: HOME | DRG: 392 | End: 2023-12-16
Attending: EMERGENCY MEDICINE | Admitting: INTERNAL MEDICINE
Payer: COMMERCIAL

## 2023-12-10 ENCOUNTER — APPOINTMENT (OUTPATIENT)
Dept: RADIOLOGY | Facility: HOSPITAL | Age: 53
DRG: 392 | End: 2023-12-10
Payer: COMMERCIAL

## 2023-12-10 DIAGNOSIS — Z78.9 FAILURE OF OUTPATIENT TREATMENT: ICD-10-CM

## 2023-12-10 DIAGNOSIS — R10.32 LEFT LOWER QUADRANT ABDOMINAL PAIN: ICD-10-CM

## 2023-12-10 DIAGNOSIS — R19.7 DIARRHEA OF PRESUMED INFECTIOUS ORIGIN: ICD-10-CM

## 2023-12-10 DIAGNOSIS — K57.20 DIVERTICULITIS OF LARGE INTESTINE WITH ABSCESS WITHOUT BLEEDING: Primary | ICD-10-CM

## 2023-12-10 DIAGNOSIS — K57.92 DIVERTICULITIS: ICD-10-CM

## 2023-12-10 LAB
ALBUMIN SERPL-MCNC: 4 G/DL (ref 3.5–5)
ALP BLD-CCNC: 97 U/L (ref 35–125)
ALT SERPL-CCNC: 44 U/L (ref 5–40)
ANION GAP SERPL CALC-SCNC: 14 MMOL/L
APPEARANCE UR: CLEAR
AST SERPL-CCNC: 23 U/L (ref 5–40)
BILIRUB SERPL-MCNC: 0.2 MG/DL (ref 0.1–1.2)
BILIRUB UR STRIP.AUTO-MCNC: NEGATIVE MG/DL
BUN SERPL-MCNC: 12 MG/DL (ref 8–25)
CALCIUM SERPL-MCNC: 9.1 MG/DL (ref 8.5–10.4)
CHLORIDE SERPL-SCNC: 99 MMOL/L (ref 97–107)
CO2 SERPL-SCNC: 25 MMOL/L (ref 24–31)
COLOR UR: NORMAL
CREAT SERPL-MCNC: 1 MG/DL (ref 0.4–1.6)
ERYTHROCYTE [DISTWIDTH] IN BLOOD BY AUTOMATED COUNT: 11.5 % (ref 11.5–14.5)
GFR SERPL CREATININE-BSD FRML MDRD: 89 ML/MIN/1.73M*2
GLUCOSE SERPL-MCNC: 107 MG/DL (ref 65–99)
GLUCOSE UR STRIP.AUTO-MCNC: NORMAL MG/DL
HCT VFR BLD AUTO: 45 % (ref 41–52)
HGB BLD-MCNC: 15.7 G/DL (ref 13.5–17.5)
KETONES UR STRIP.AUTO-MCNC: NEGATIVE MG/DL
LEUKOCYTE ESTERASE UR QL STRIP.AUTO: NEGATIVE
LIPASE SERPL-CCNC: 43 U/L (ref 16–63)
MCH RBC QN AUTO: 31.7 PG (ref 26–34)
MCHC RBC AUTO-ENTMCNC: 34.9 G/DL (ref 32–36)
MCV RBC AUTO: 91 FL (ref 80–100)
NITRITE UR QL STRIP.AUTO: NEGATIVE
NRBC BLD-RTO: 0 /100 WBCS (ref 0–0)
PH UR STRIP.AUTO: 6 [PH]
PLATELET # BLD AUTO: 299 X10*3/UL (ref 150–450)
POTASSIUM SERPL-SCNC: 3.3 MMOL/L (ref 3.4–5.1)
PROT SERPL-MCNC: 7.7 G/DL (ref 5.9–7.9)
PROT UR STRIP.AUTO-MCNC: NEGATIVE MG/DL
RBC # BLD AUTO: 4.95 X10*6/UL (ref 4.5–5.9)
RBC # UR STRIP.AUTO: NEGATIVE /UL
SODIUM SERPL-SCNC: 138 MMOL/L (ref 133–145)
SP GR UR STRIP.AUTO: 1.01
UROBILINOGEN UR STRIP.AUTO-MCNC: NORMAL MG/DL
WBC # BLD AUTO: 12.4 X10*3/UL (ref 4.4–11.3)

## 2023-12-10 PROCEDURE — 96361 HYDRATE IV INFUSION ADD-ON: CPT

## 2023-12-10 PROCEDURE — 80053 COMPREHEN METABOLIC PANEL: CPT | Performed by: NURSE PRACTITIONER

## 2023-12-10 PROCEDURE — 99285 EMERGENCY DEPT VISIT HI MDM: CPT | Performed by: EMERGENCY MEDICINE

## 2023-12-10 PROCEDURE — 83690 ASSAY OF LIPASE: CPT | Performed by: NURSE PRACTITIONER

## 2023-12-10 PROCEDURE — 36415 COLL VENOUS BLD VENIPUNCTURE: CPT | Performed by: NURSE PRACTITIONER

## 2023-12-10 PROCEDURE — 96374 THER/PROPH/DIAG INJ IV PUSH: CPT

## 2023-12-10 PROCEDURE — 81003 URINALYSIS AUTO W/O SCOPE: CPT | Performed by: NURSE PRACTITIONER

## 2023-12-10 PROCEDURE — 74177 CT ABD & PELVIS W/CONTRAST: CPT

## 2023-12-10 PROCEDURE — 85027 COMPLETE CBC AUTOMATED: CPT | Performed by: NURSE PRACTITIONER

## 2023-12-10 PROCEDURE — 2500000004 HC RX 250 GENERAL PHARMACY W/ HCPCS (ALT 636 FOR OP/ED): Performed by: EMERGENCY MEDICINE

## 2023-12-10 PROCEDURE — 96375 TX/PRO/DX INJ NEW DRUG ADDON: CPT

## 2023-12-10 PROCEDURE — 2550000001 HC RX 255 CONTRASTS: Performed by: EMERGENCY MEDICINE

## 2023-12-10 PROCEDURE — 2500000001 HC RX 250 WO HCPCS SELF ADMINISTERED DRUGS (ALT 637 FOR MEDICARE OP): Performed by: EMERGENCY MEDICINE

## 2023-12-10 RX ORDER — ONDANSETRON HYDROCHLORIDE 2 MG/ML
4 INJECTION, SOLUTION INTRAVENOUS ONCE
Status: COMPLETED | OUTPATIENT
Start: 2023-12-10 | End: 2023-12-10

## 2023-12-10 RX ORDER — KETOROLAC TROMETHAMINE 30 MG/ML
15 INJECTION, SOLUTION INTRAMUSCULAR; INTRAVENOUS ONCE
Status: COMPLETED | OUTPATIENT
Start: 2023-12-10 | End: 2023-12-10

## 2023-12-10 RX ORDER — DICYCLOMINE HYDROCHLORIDE 20 MG/1
20 TABLET ORAL ONCE
Status: COMPLETED | OUTPATIENT
Start: 2023-12-10 | End: 2023-12-10

## 2023-12-10 RX ORDER — FAMOTIDINE 10 MG/ML
20 INJECTION INTRAVENOUS ONCE
Status: COMPLETED | OUTPATIENT
Start: 2023-12-10 | End: 2023-12-10

## 2023-12-10 RX ADMIN — FAMOTIDINE 20 MG: 10 INJECTION, SOLUTION INTRAVENOUS at 23:17

## 2023-12-10 RX ADMIN — KETOROLAC TROMETHAMINE 15 MG: 30 INJECTION, SOLUTION INTRAMUSCULAR at 23:23

## 2023-12-10 RX ADMIN — ONDANSETRON 4 MG: 2 INJECTION INTRAMUSCULAR; INTRAVENOUS at 23:21

## 2023-12-10 RX ADMIN — DICYCLOMINE HYDROCHLORIDE 20 MG: 20 TABLET ORAL at 23:16

## 2023-12-10 RX ADMIN — IOHEXOL 75 ML: 350 INJECTION, SOLUTION INTRAVENOUS at 22:12

## 2023-12-10 RX ADMIN — SODIUM CHLORIDE 500 ML: 900 INJECTION, SOLUTION INTRAVENOUS at 23:28

## 2023-12-10 ASSESSMENT — PAIN DESCRIPTION - ORIENTATION: ORIENTATION: LEFT

## 2023-12-10 ASSESSMENT — PAIN DESCRIPTION - PROGRESSION: CLINICAL_PROGRESSION: NOT CHANGED

## 2023-12-10 ASSESSMENT — PAIN DESCRIPTION - PAIN TYPE: TYPE: ACUTE PAIN

## 2023-12-10 ASSESSMENT — PAIN DESCRIPTION - FREQUENCY: FREQUENCY: INTERMITTENT

## 2023-12-10 ASSESSMENT — PAIN DESCRIPTION - LOCATION: LOCATION: ABDOMEN

## 2023-12-10 ASSESSMENT — PAIN - FUNCTIONAL ASSESSMENT: PAIN_FUNCTIONAL_ASSESSMENT: 0-10

## 2023-12-10 ASSESSMENT — PAIN SCALES - GENERAL: PAINLEVEL_OUTOF10: 6

## 2023-12-10 ASSESSMENT — PAIN DESCRIPTION - ONSET: ONSET: ONGOING

## 2023-12-10 NOTE — LETTER
December 16, 2023     Patient: Tom Treviño   YOB: 1970   Date of Visit: 12/10/2023       To Whom It May Concern:    Tom Treviño was admitted to the hospital on December 10 and discharged on December 16.  He may go back to work on December 19 without restrictions.  If you have any questions or concerns, please don't hesitate to call.         Sincerely,         Ben Higgins MD        CC: No Recipients

## 2023-12-11 PROBLEM — K57.92 DIVERTICULITIS: Status: ACTIVE | Noted: 2023-12-11

## 2023-12-11 LAB
ANION GAP SERPL CALC-SCNC: 9 MMOL/L
BUN SERPL-MCNC: 13 MG/DL (ref 8–25)
C COLI+JEJ+UPSA DNA STL QL NAA+PROBE: NOT DETECTED
CALCIUM SERPL-MCNC: 8.5 MG/DL (ref 8.5–10.4)
CHLORIDE SERPL-SCNC: 103 MMOL/L (ref 97–107)
CO2 SERPL-SCNC: 27 MMOL/L (ref 24–31)
CREAT SERPL-MCNC: 1.2 MG/DL (ref 0.4–1.6)
EC STX1 GENE STL QL NAA+PROBE: NOT DETECTED
EC STX2 GENE STL QL NAA+PROBE: NOT DETECTED
ERYTHROCYTE [DISTWIDTH] IN BLOOD BY AUTOMATED COUNT: 11.6 % (ref 11.5–14.5)
GFR SERPL CREATININE-BSD FRML MDRD: 71 ML/MIN/1.73M*2
GLUCOSE SERPL-MCNC: 109 MG/DL (ref 65–99)
HCT VFR BLD AUTO: 43.7 % (ref 41–52)
HGB BLD-MCNC: 14.9 G/DL (ref 13.5–17.5)
MCH RBC QN AUTO: 31.9 PG (ref 26–34)
MCHC RBC AUTO-ENTMCNC: 34.1 G/DL (ref 32–36)
MCV RBC AUTO: 94 FL (ref 80–100)
NOROVIRUS GI + GII RNA STL NAA+PROBE: NOT DETECTED
NRBC BLD-RTO: 0 /100 WBCS (ref 0–0)
PLATELET # BLD AUTO: 265 X10*3/UL (ref 150–450)
POTASSIUM SERPL-SCNC: 3.6 MMOL/L (ref 3.4–5.1)
RBC # BLD AUTO: 4.67 X10*6/UL (ref 4.5–5.9)
RV RNA STL NAA+PROBE: NOT DETECTED
SALMONELLA DNA STL QL NAA+PROBE: NOT DETECTED
SHIGELLA DNA SPEC QL NAA+PROBE: NOT DETECTED
SODIUM SERPL-SCNC: 139 MMOL/L (ref 133–145)
V CHOLERAE DNA STL QL NAA+PROBE: NOT DETECTED
WBC # BLD AUTO: 11 X10*3/UL (ref 4.4–11.3)
Y ENTEROCOL DNA STL QL NAA+PROBE: NOT DETECTED

## 2023-12-11 PROCEDURE — 2500000004 HC RX 250 GENERAL PHARMACY W/ HCPCS (ALT 636 FOR OP/ED): Performed by: NURSE PRACTITIONER

## 2023-12-11 PROCEDURE — 2500000001 HC RX 250 WO HCPCS SELF ADMINISTERED DRUGS (ALT 637 FOR MEDICARE OP): Performed by: INTERNAL MEDICINE

## 2023-12-11 PROCEDURE — 1100000001 HC PRIVATE ROOM DAILY

## 2023-12-11 PROCEDURE — 2500000004 HC RX 250 GENERAL PHARMACY W/ HCPCS (ALT 636 FOR OP/ED): Performed by: INTERNAL MEDICINE

## 2023-12-11 PROCEDURE — 87506 IADNA-DNA/RNA PROBE TQ 6-11: CPT | Mod: TRILAB | Performed by: EMERGENCY MEDICINE

## 2023-12-11 PROCEDURE — 96372 THER/PROPH/DIAG INJ SC/IM: CPT | Performed by: INTERNAL MEDICINE

## 2023-12-11 PROCEDURE — 94760 N-INVAS EAR/PLS OXIMETRY 1: CPT

## 2023-12-11 PROCEDURE — 85027 COMPLETE CBC AUTOMATED: CPT | Performed by: INTERNAL MEDICINE

## 2023-12-11 PROCEDURE — 80048 BASIC METABOLIC PNL TOTAL CA: CPT | Performed by: INTERNAL MEDICINE

## 2023-12-11 PROCEDURE — 36415 COLL VENOUS BLD VENIPUNCTURE: CPT | Performed by: INTERNAL MEDICINE

## 2023-12-11 PROCEDURE — 2500000004 HC RX 250 GENERAL PHARMACY W/ HCPCS (ALT 636 FOR OP/ED): Performed by: EMERGENCY MEDICINE

## 2023-12-11 RX ORDER — HYDROCHLOROTHIAZIDE 25 MG/1
25 TABLET ORAL DAILY
Status: DISCONTINUED | OUTPATIENT
Start: 2023-12-11 | End: 2023-12-13

## 2023-12-11 RX ORDER — PANTOPRAZOLE SODIUM 40 MG/1
40 TABLET, DELAYED RELEASE ORAL
Status: DISCONTINUED | OUTPATIENT
Start: 2023-12-11 | End: 2023-12-16 | Stop reason: HOSPADM

## 2023-12-11 RX ORDER — ONDANSETRON HYDROCHLORIDE 2 MG/ML
4 INJECTION, SOLUTION INTRAVENOUS EVERY 8 HOURS PRN
Status: DISCONTINUED | OUTPATIENT
Start: 2023-12-11 | End: 2023-12-16 | Stop reason: HOSPADM

## 2023-12-11 RX ORDER — VANCOMYCIN 2 G/400ML
2000 INJECTION, SOLUTION INTRAVENOUS ONCE
Status: COMPLETED | OUTPATIENT
Start: 2023-12-11 | End: 2023-12-11

## 2023-12-11 RX ORDER — ACETAMINOPHEN 160 MG/5ML
650 SOLUTION ORAL EVERY 4 HOURS PRN
Status: DISCONTINUED | OUTPATIENT
Start: 2023-12-11 | End: 2023-12-11

## 2023-12-11 RX ORDER — ACETAMINOPHEN 650 MG/1
650 SUPPOSITORY RECTAL EVERY 4 HOURS PRN
Status: DISCONTINUED | OUTPATIENT
Start: 2023-12-11 | End: 2023-12-11

## 2023-12-11 RX ORDER — ACETAMINOPHEN 650 MG/1
650 SUPPOSITORY RECTAL EVERY 4 HOURS PRN
Status: DISCONTINUED | OUTPATIENT
Start: 2023-12-11 | End: 2023-12-16 | Stop reason: HOSPADM

## 2023-12-11 RX ORDER — MORPHINE SULFATE 4 MG/ML
4 INJECTION, SOLUTION INTRAMUSCULAR; INTRAVENOUS EVERY 4 HOURS PRN
Status: DISCONTINUED | OUTPATIENT
Start: 2023-12-11 | End: 2023-12-15

## 2023-12-11 RX ORDER — POLYETHYLENE GLYCOL 3350 17 G/17G
17 POWDER, FOR SOLUTION ORAL DAILY
Status: DISCONTINUED | OUTPATIENT
Start: 2023-12-11 | End: 2023-12-15

## 2023-12-11 RX ORDER — SODIUM CHLORIDE AND POTASSIUM CHLORIDE 150; 900 MG/100ML; MG/100ML
100 INJECTION, SOLUTION INTRAVENOUS CONTINUOUS
Status: DISCONTINUED | OUTPATIENT
Start: 2023-12-11 | End: 2023-12-13

## 2023-12-11 RX ORDER — IBUPROFEN 400 MG/1
400 TABLET ORAL EVERY 4 HOURS PRN
Status: DISCONTINUED | OUTPATIENT
Start: 2023-12-11 | End: 2023-12-16 | Stop reason: HOSPADM

## 2023-12-11 RX ORDER — LANSOPRAZOLE 30 MG/1
30 CAPSULE, DELAYED RELEASE ORAL
Status: DISCONTINUED | OUTPATIENT
Start: 2023-12-11 | End: 2023-12-11

## 2023-12-11 RX ORDER — ONDANSETRON 4 MG/1
4 TABLET, ORALLY DISINTEGRATING ORAL EVERY 8 HOURS PRN
Status: DISCONTINUED | OUTPATIENT
Start: 2023-12-11 | End: 2023-12-16 | Stop reason: HOSPADM

## 2023-12-11 RX ORDER — CIPROFLOXACIN 2 MG/ML
400 INJECTION, SOLUTION INTRAVENOUS EVERY 12 HOURS
Status: DISCONTINUED | OUTPATIENT
Start: 2023-12-11 | End: 2023-12-11

## 2023-12-11 RX ORDER — ACETAMINOPHEN 325 MG/1
650 TABLET ORAL EVERY 4 HOURS PRN
Status: DISCONTINUED | OUTPATIENT
Start: 2023-12-11 | End: 2023-12-11

## 2023-12-11 RX ORDER — ENOXAPARIN SODIUM 100 MG/ML
40 INJECTION SUBCUTANEOUS EVERY 24 HOURS
Status: DISCONTINUED | OUTPATIENT
Start: 2023-12-11 | End: 2023-12-16 | Stop reason: HOSPADM

## 2023-12-11 RX ORDER — ACETAMINOPHEN 160 MG/5ML
650 SOLUTION ORAL EVERY 4 HOURS PRN
Status: DISCONTINUED | OUTPATIENT
Start: 2023-12-11 | End: 2023-12-16 | Stop reason: HOSPADM

## 2023-12-11 RX ORDER — ACETAMINOPHEN 325 MG/1
650 TABLET ORAL EVERY 4 HOURS PRN
Status: DISCONTINUED | OUTPATIENT
Start: 2023-12-11 | End: 2023-12-16 | Stop reason: HOSPADM

## 2023-12-11 RX ORDER — METRONIDAZOLE 500 MG/100ML
500 INJECTION, SOLUTION INTRAVENOUS EVERY 8 HOURS
Status: DISCONTINUED | OUTPATIENT
Start: 2023-12-11 | End: 2023-12-11

## 2023-12-11 RX ADMIN — PIPERACILLIN SODIUM AND TAZOBACTAM SODIUM 3.38 G: 3; .375 INJECTION, SOLUTION INTRAVENOUS at 00:11

## 2023-12-11 RX ADMIN — VANCOMYCIN 2000 MG: 2 INJECTION, SOLUTION INTRAVENOUS at 01:23

## 2023-12-11 RX ADMIN — METRONIDAZOLE 500 MG: 500 INJECTION, SOLUTION INTRAVENOUS at 05:26

## 2023-12-11 RX ADMIN — PIPERACILLIN SODIUM AND TAZOBACTAM SODIUM 3.38 G: 3; .375 INJECTION, SOLUTION INTRAVENOUS at 21:28

## 2023-12-11 RX ADMIN — PIPERACILLIN SODIUM AND TAZOBACTAM SODIUM 3.38 G: 3; .375 INJECTION, SOLUTION INTRAVENOUS at 16:10

## 2023-12-11 RX ADMIN — CIPROFLOXACIN 400 MG: 400 INJECTION, SOLUTION INTRAVENOUS at 03:16

## 2023-12-11 RX ADMIN — SODIUM CHLORIDE AND POTASSIUM CHLORIDE 100 ML/HR: 9; 1.49 INJECTION, SOLUTION INTRAVENOUS at 19:13

## 2023-12-11 RX ADMIN — HYDROCHLOROTHIAZIDE 25 MG: 25 TABLET ORAL at 10:08

## 2023-12-11 RX ADMIN — METRONIDAZOLE 500 MG: 500 INJECTION, SOLUTION INTRAVENOUS at 10:09

## 2023-12-11 RX ADMIN — SODIUM CHLORIDE AND POTASSIUM CHLORIDE 100 ML/HR: 9; 1.49 INJECTION, SOLUTION INTRAVENOUS at 06:35

## 2023-12-11 SDOH — SOCIAL STABILITY: SOCIAL INSECURITY: HAVE YOU HAD THOUGHTS OF HARMING ANYONE ELSE?: NO

## 2023-12-11 SDOH — SOCIAL STABILITY: SOCIAL INSECURITY: WERE YOU ABLE TO COMPLETE ALL THE BEHAVIORAL HEALTH SCREENINGS?: YES

## 2023-12-11 SDOH — SOCIAL STABILITY: SOCIAL INSECURITY: ABUSE: ADULT

## 2023-12-11 SDOH — SOCIAL STABILITY: SOCIAL INSECURITY: POSSIBLE ABUSE REPORTED TO:: ADVOCATE

## 2023-12-11 SDOH — SOCIAL STABILITY: SOCIAL INSECURITY: DO YOU FEEL ANYONE HAS EXPLOITED OR TAKEN ADVANTAGE OF YOU FINANCIALLY OR OF YOUR PERSONAL PROPERTY?: NO

## 2023-12-11 SDOH — SOCIAL STABILITY: SOCIAL INSECURITY: ARE THERE ANY APPARENT SIGNS OF INJURIES/BEHAVIORS THAT COULD BE RELATED TO ABUSE/NEGLECT?: NO

## 2023-12-11 SDOH — SOCIAL STABILITY: SOCIAL INSECURITY: ARE YOU OR HAVE YOU BEEN THREATENED OR ABUSED PHYSICALLY, EMOTIONALLY, OR SEXUALLY BY ANYONE?: NO

## 2023-12-11 SDOH — SOCIAL STABILITY: SOCIAL INSECURITY: DO YOU FEEL UNSAFE GOING BACK TO THE PLACE WHERE YOU ARE LIVING?: NO

## 2023-12-11 SDOH — SOCIAL STABILITY: SOCIAL INSECURITY: HAS ANYONE EVER THREATENED TO HURT YOUR FAMILY OR YOUR PETS?: NO

## 2023-12-11 SDOH — SOCIAL STABILITY: SOCIAL INSECURITY: DOES ANYONE TRY TO KEEP YOU FROM HAVING/CONTACTING OTHER FRIENDS OR DOING THINGS OUTSIDE YOUR HOME?: NO

## 2023-12-11 ASSESSMENT — ENCOUNTER SYMPTOMS
RESPIRATORY NEGATIVE: 1
CARDIOVASCULAR NEGATIVE: 1
ABDOMINAL PAIN: 1
NEUROLOGICAL NEGATIVE: 1
EYES NEGATIVE: 1
PSYCHIATRIC NEGATIVE: 1
DIARRHEA: 1
MUSCULOSKELETAL NEGATIVE: 1
ENDOCRINE NEGATIVE: 1
APPETITE CHANGE: 1

## 2023-12-11 ASSESSMENT — ACTIVITIES OF DAILY LIVING (ADL)
JUDGMENT_ADEQUATE_SAFELY_COMPLETE_DAILY_ACTIVITIES: YES
DRESSING YOURSELF: INDEPENDENT
WALKS IN HOME: INDEPENDENT
HEARING - RIGHT EAR: FUNCTIONAL
FEEDING YOURSELF: INDEPENDENT
PATIENT'S MEMORY ADEQUATE TO SAFELY COMPLETE DAILY ACTIVITIES?: YES
GROOMING: INDEPENDENT
TOILETING: INDEPENDENT
BATHING: INDEPENDENT
ADEQUATE_TO_COMPLETE_ADL: YES
HEARING - LEFT EAR: FUNCTIONAL

## 2023-12-11 ASSESSMENT — COGNITIVE AND FUNCTIONAL STATUS - GENERAL
DAILY ACTIVITIY SCORE: 24
MOBILITY SCORE: 24
MOBILITY SCORE: 24
PATIENT BASELINE BEDBOUND: NO
DAILY ACTIVITIY SCORE: 24

## 2023-12-11 ASSESSMENT — LIFESTYLE VARIABLES
SKIP TO QUESTIONS 9-10: 1
PRESCIPTION_ABUSE_PAST_12_MONTHS: NO
HOW MANY STANDARD DRINKS CONTAINING ALCOHOL DO YOU HAVE ON A TYPICAL DAY: 1 OR 2
AUDIT-C TOTAL SCORE: 1
SUBSTANCE_ABUSE_PAST_12_MONTHS: NO
HOW OFTEN DO YOU HAVE 6 OR MORE DRINKS ON ONE OCCASION: NEVER
AUDIT-C TOTAL SCORE: 1
HOW OFTEN DO YOU HAVE A DRINK CONTAINING ALCOHOL: MONTHLY OR LESS

## 2023-12-11 ASSESSMENT — PAIN SCALES - GENERAL
PAINLEVEL_OUTOF10: 0 - NO PAIN

## 2023-12-11 ASSESSMENT — PATIENT HEALTH QUESTIONNAIRE - PHQ9
1. LITTLE INTEREST OR PLEASURE IN DOING THINGS: NOT AT ALL
2. FEELING DOWN, DEPRESSED OR HOPELESS: NOT AT ALL
SUM OF ALL RESPONSES TO PHQ9 QUESTIONS 1 & 2: 0

## 2023-12-11 NOTE — NURSING NOTE
Patient arrived from ER.Patient is A&Ox3 and is oriented to the room. Call light in reach. Patient is 2/10 pain. Patient is on room air.

## 2023-12-11 NOTE — ED PROVIDER NOTES
HPI   Chief Complaint   Patient presents with    Abdominal Pain     Pt was seen 2 weeks ago for an abscess left side abdomen. Pt still having abdominal pain after he was admitted for the abscess.       53-year-old male with a history of hypertension and recent abdominal abscess drainage comes to the emergency department with a chief complaint of abdominal pain.  His surgical history is also remarkable for an appendectomy.  Patient reports continued lower abdominal pain after being on Augmentin for 10 days for an abdominal abscess that he was treated for here at . He states today is the 10th day and he did not take his pills today in case it would interfere with any testing we would do. He complains of intermittent, sharp, cramping pain across his lower abdomen that is now as bad as it was prior to starting treatment on it 2 weeks ago. He reports the pain gets up to 6/10. Patient states he has not been having normal bowel movements and that they have been small and loose. He states he had 3 small, loose BMs today and that when he has one, it tends to help reduce his pain. He states he does not have to strain to have a BM. He states he has been on a Diverticulitis diet recently. He denies any nausea, vomiting, chest pain or shortness of breath.      History provided by:  Patient   used: No                        No data recorded                Patient History   Past Medical History:   Diagnosis Date    COVID-19     COVID-19    Diverticulitis     GERD (gastroesophageal reflux disease)     HTN (hypertension)      Past Surgical History:   Procedure Laterality Date    APPENDECTOMY  02/17/2015    Appendectomy    CT GUIDED PERCUTANEOUS PERITONEAL OR RETROPERITONEAL FLUID COLLECTION DRAINAGE  11/28/2023    CT GUIDED PERCUTANEOUS PERITONEAL OR RETROPERITONEAL FLUID COLLECTION DRAINAGE 11/28/2023 [facility]     No family history on file.  Social History     Tobacco Use    Smoking status: Never    Smokeless  tobacco: Never   Substance Use Topics    Alcohol use: Yes     Comment: OCCASIONALLY    Drug use: Never       Physical Exam   ED Triage Vitals [12/10/23 2112]   Temp Heart Rate Resp BP   36.6 °C (97.9 °F) 79 16 132/77      SpO2 Temp Source Heart Rate Source Patient Position   99 % Temporal Monitor Sitting      BP Location FiO2 (%)     Right arm --       Physical Exam  Vitals and nursing note reviewed.   Constitutional:       General: He is not in acute distress.     Appearance: He is well-developed.   HENT:      Head: Normocephalic and atraumatic.   Eyes:      Conjunctiva/sclera: Conjunctivae normal.   Cardiovascular:      Rate and Rhythm: Normal rate and regular rhythm.      Heart sounds: No murmur heard.  Pulmonary:      Effort: Pulmonary effort is normal. No respiratory distress.      Breath sounds: Normal breath sounds.   Abdominal:      General: There is no distension.      Palpations: Abdomen is soft.      Tenderness: There is abdominal tenderness in the left lower quadrant. There is guarding. There is no rebound.   Musculoskeletal:         General: No swelling.      Cervical back: Neck supple.   Skin:     General: Skin is warm and dry.      Capillary Refill: Capillary refill takes less than 2 seconds.   Neurological:      General: No focal deficit present.      Mental Status: He is alert.   Psychiatric:         Mood and Affect: Mood normal.         ED Course & MDM   ED Course as of 12/11/23 0006   Sun Dec 10, 2023   2238 Vital signs within normal limits [EG]   2239 Labs reviewed and he has a normal lipase and CMP for a nonfasting patient shows only a minimal hypokalemia.  CBC shows a mild leukocytosis of 12.4 and otherwise within normal limits.  Urinalysis still pending. [EG]   2358 Stool sample ordered and still pending [EG]   2358 CT scan reviewed and shows a diverticulitis with abscess formation similar to previous [EG]   2358 Spoke with surgeon Dr. Delgado who believes that the patient still is likely not  a surgical candidate and requests admission to the hospitalist service [EG]      ED Course User Index  [EG] Edilma Garcia MD         Diagnoses as of 12/11/23 0006   Diverticulitis of large intestine with abscess without bleeding   Diarrhea of presumed infectious origin   Left lower quadrant abdominal pain   Failure of outpatient treatment   Diverticulitis       Medical Decision Making    HPI:  As Above  PMHx/PSHx/Meds/Allergies/SH/FH as per nursing documentation and reviewed.  Review of systems: Total of 10 systems reviewed and otherwise negative except as noted elsewhere    DDX: As described in MDM    If performed, radiology listed above interpreted by me and confirmed by the Radiologist.  Medications administered during this visit (name and route): see MAR  Social determinants of health considered for this visit: Lives at home  If performed, EKG interpreted by me and detailed above    [facility] Summary/considerations:  83-year-old male presenting with recurrent left lower quadrant pain and diarrhea.  He had near completion of his Augmentin with return of his symptoms.  A repeat CT scan was performed of the abdomen and pelvis showing the abscess has not improved.  He is not meeting SIRS criteria for sepsis, but does have a minimal leukocytosis just above 12.  As he has been compliant with Augmentin and return of symptoms, broad-spectrum antibiotics are ordered including vancomycin and Zosyn dosed at intra-abdominal 3.375.  The case was discussed with the general surgeon on-call as well as the hospitalist.  The patient will be primarily managed by the hospitalist service with surgery consult.  CT scan is not consistent with perforation, obstruction, ischemic colitis, or renal pathology.  No evidence of severe dehydration and again not meeting criteria for sepsis.  No hypotension.    The patient was seen and triaged by our nursing/medic staff, their vitals were taken and the staff notes were reviewed.  If the  patient arrived by an EMS squad or an outside agency, we discussed the case with transporting EMS medic, police, or other historians. My initial assessment was attention to their airway, breathing, and circulatory status.  We addressed any immediate or life threatening findings and completed a medical history and a physical exam if the patient or those legally responsible were in agreement with this.   Prior to the patient being discharged, I or my PA/NP or the nursing staff discussed the differential, results and discharge plan with the patient and/or family/friend/caregiver if present.  I emphasized the importance of follow-up in 2-3 days unless otherwise specified.  I explained reasons for the patient to return to the Emergency Department. Additional verbal discharge instructions were also given and discussed with the patient to supplement those generated by the EMR. We also discussed medications that were prescribed (if any) including common side effects and interactions. The patient was advised to abstain from driving, operating heavy machinery or making significant decisions while taking medications such as antihistamines, benzodiazepines, opiates and muscle relaxers. All questions were addressed.  They understand return precautions and discharge instructions. The patient and/or family/friend/caregiver expressed understanding.  **Disclaimer:  This note was dictated by speech recognition technology.  Minor errors in transcription may be present.  Please contact for clarification or corrections.    In the case the patient eloped or refused treatment/admission, we offered to the best of our ability to provide care to the patient at the time of this encounter.    Amount and/or Complexity of Data Reviewed  External Data Reviewed: labs, radiology and notes.  Labs: ordered. Decision-making details documented in ED Course.  Radiology: ordered and independent interpretation performed. Decision-making details documented  in ED Course.        Procedure  Procedures     Edilma Garcia MD  12/11/23 0006

## 2023-12-11 NOTE — PROGRESS NOTES
Tom Treviño is a 53 y.o. male on day 0 of admission presenting with Diverticulitis.      Subjective   No pain today.  Surgery to order IR drainage       Objective     Last Recorded Vitals  /70 (BP Location: Right arm, Patient Position: Lying)   Pulse 70   Temp 36.8 °C (98.2 °F) (Oral)   Resp 16   Wt 118 kg (259 lb 4.2 oz)   SpO2 97%   Intake/Output last 3 Shifts:    Intake/Output Summary (Last 24 hours) at 12/11/2023 0953  Last data filed at 12/11/2023 0626  Gross per 24 hour   Intake 1250 ml   Output --   Net 1250 ml       Physical Exam  I saw this patient in room 305 together with surgical nurse practitioner.  Alert Freddy x 3 cooperative no distress  Chest clear  Heart regular  Abdomen obese soft not particularly tender positive bowel sounds  Extremities no peripheral edema  Neurologic exam gross motor sensor nonfocal  Relevant Results  Reviewed  Assessment/Plan     Principal Problem:    Diverticulitis      Surgery to order IR drainage continue antibiotics ID to evaluate as well GI DVT prophylaxis             Ben Higgins MD

## 2023-12-11 NOTE — CONSULTS
Inpatient consult to Infectious Diseases  Consult performed by: Doris Burns, APRN-CNP  Consult ordered by: Aidee Santiago DO  Reason for consult: Diverticulitis with abscess          Primary MD: Murphy Lenz MD        History Of Present Illness  Tom Treviño is a 53 y.o. male with past medical history of diverticulitis.  He was recently hospitalized on 11/28/2023 and treated for diverticulitis with abscess.  During previous admission IR did not recommend drainage.  He was given IV Zosyn and transition to oral Augmentin for total of 14 days.  He reported he had two days left of Augmentin.  He reported he began having increasing abdominal discomfort.  He reports decrease appetite today.  He reports loose stool.  He reports some improvement since starting IV antibiotics.  He is afebrile, denies chills.  He denies shortness of breath cough or chest pain.  Labs with resolved leukocytosis.  CT scan of abdomen pelvis showed pericolonic abscess.  He is on IV Flagyl, ciprofloxacin.     Past Medical History  He has a past medical history of COVID-19, Diverticulitis, GERD (gastroesophageal reflux disease), and HTN (hypertension).    Surgical History  He has a past surgical history that includes Appendectomy (02/17/2015) and CT guided percutaneous peritoneal or retroperitoneal fluid collection drainage (11/28/2023).     Social History     Occupational History    Not on file   Tobacco Use    Smoking status: Never    Smokeless tobacco: Never   Substance and Sexual Activity    Alcohol use: Yes     Comment: OCCASIONALLY    Drug use: Never    Sexual activity: Not on file     Travel History   Travel since 11/11/23    No documented travel since 11/11/23            Family History  No family history on file.  Allergies  Esomeprazole magnesium     Immunization History   Administered Date(s) Administered    Pfizer Gray Cap SARS-CoV-2 02/04/2022    Pfizer Purple Cap SARS-CoV-2 01/14/2022     Medications  Home  "medications:  Medications Prior to Admission   Medication Sig Dispense Refill Last Dose    amoxicillin-pot clavulanate (Augmentin) 875-125 mg tablet Take 1 tablet (875 mg) by mouth 2 times a day for 12 days. 24 tablet 0 12/10/2023 at 0800    hydroCHLOROthiazide (HYDRODiuril) 25 mg tablet TAKE 1 TABLET BY MOUTH EVERY DAY 90 tablet 1 12/10/2023 at 0800    lansoprazole (Prevacid) 30 mg DR capsule Take 1 capsule (30 mg) by mouth once daily in the morning. Take before meals.   12/10/2023 at 0800     Current medications:  Scheduled medications  ciprofloxacin, 400 mg, intravenous, q12h  enoxaparin, 40 mg, subcutaneous, q24h  hydroCHLOROthiazide, 25 mg, oral, Daily  metroNIDAZOLE, 500 mg, intravenous, q8h  pantoprazole, 40 mg, oral, Daily before breakfast  polyethylene glycol, 17 g, oral, Daily      Continuous medications  potassium chloride in 0.9%NaCl, 100 mL/hr, Last Rate: 100 mL/hr (12/11/23 0635)      PRN medications  PRN medications: acetaminophen **OR** acetaminophen **OR** acetaminophen, ibuprofen, morphine, ondansetron ODT **OR** ondansetron    Review of Systems   Constitutional:  Positive for appetite change.   HENT: Negative.     Eyes: Negative.    Respiratory: Negative.     Cardiovascular: Negative.    Gastrointestinal:  Positive for abdominal pain and diarrhea.   Endocrine: Negative.    Genitourinary: Negative.    Musculoskeletal: Negative.    Skin: Negative.    Neurological: Negative.    Psychiatric/Behavioral: Negative.          Objective  Range of Vitals (last 24 hours)  Heart Rate:  [61-79]   Temp:  [36.5 °C (97.7 °F)-36.8 °C (98.2 °F)]   Resp:  [14-16]   BP: (121-144)/(68-81)   Height:  [188 cm (6' 2\")]   Weight:  [118 kg (259 lb 4.2 oz)]   SpO2:  [78 %-99 %]   Daily Weight  12/10/23 : 118 kg (259 lb 4.2 oz)    Body mass index is 33.29 kg/m².     Physical Exam  Constitutional:       Appearance: Normal appearance.   HENT:      Head: Normocephalic and atraumatic.      Nose: Nose normal.      Mouth/Throat:    " "  Mouth: Mucous membranes are moist.      Pharynx: Oropharynx is clear.   Eyes:      Extraocular Movements: Extraocular movements intact.      Conjunctiva/sclera: Conjunctivae normal.   Cardiovascular:      Rate and Rhythm: Normal rate and regular rhythm.   Pulmonary:      Effort: Pulmonary effort is normal.      Breath sounds: Normal breath sounds.   Abdominal:      General: Bowel sounds are normal.      Palpations: Abdomen is soft.   Musculoskeletal:         General: Normal range of motion.      Cervical back: Normal range of motion and neck supple.   Skin:     General: Skin is warm and dry.   Neurological:      General: No focal deficit present.      Mental Status: He is alert and oriented to person, place, and time. Mental status is at baseline.   Psychiatric:         Mood and Affect: Mood normal.         Behavior: Behavior normal.          Relevant Results  Outside Hospital Results  No  Labs  Results from last 72 hours   Lab Units 12/11/23  0424 12/10/23  2132   WBC AUTO x10*3/uL 11.0 12.4*   HEMOGLOBIN g/dL 14.9 15.7   HEMATOCRIT % 43.7 45.0   PLATELETS AUTO x10*3/uL 265 299     Results from last 72 hours   Lab Units 12/11/23  0424 12/10/23  2132   SODIUM mmol/L 139 138   POTASSIUM mmol/L 3.6 3.3*   CHLORIDE mmol/L 103 99   CO2 mmol/L 27 25   BUN mg/dL 13 12   CREATININE mg/dL 1.20 1.00   GLUCOSE mg/dL 109* 107*   CALCIUM mg/dL 8.5 9.1   ANION GAP mmol/L 9 14   EGFR mL/min/1.73m*2 71 89     Results from last 72 hours   Lab Units 12/10/23  2132   ALK PHOS U/L 97   BILIRUBIN TOTAL mg/dL 0.2   PROTEIN TOTAL g/dL 7.7   ALT U/L 44*   AST U/L 23   ALBUMIN g/dL 4.0     Estimated Creatinine Clearance: 94.9 mL/min (by C-G formula based on SCr of 1.2 mg/dL).  No results found for: \"CRP\", \"SEDRATE\"  No results found for: \"HIV1X2\", \"HIVCONF\", \"UFMPGV6AA\"  Hepatitis C Ab   Date Value Ref Range Status   06/16/2021 NONREACTIVE NONREACTIVE Final     Comment:      Results from patients taking biotin supplements or receiving   " high-dose biotin therapy should be interpreted with caution   due to possible interference with this test. Providers may    contact their local laboratory for further information.       Microbiology  No results found for the last 90 days.      Imaging  CT abdomen pelvis w IV contrast    Result Date: 12/10/2023  Interpreted By:  Richard Gilman, STUDY: CT ABDOMEN PELVIS W IV CONTRAST; 12/10/2023 10:14 pm   INDICATION: Abscess left side of abdomen, continued abdominal pain   COMPARISON: 11/28/2023   ACCESSION NUMBER(S): [id number]   ORDERING CLINICIAN: JASON OLIVAS   TECHNIQUE: Contiguous axial images of the abdomen/pelvis were performed with IV contrast. 75 ml of Omnipaque 350 was utilized. Coronal and sagittal reformatted images were also obtained. All CT examinations are performed with 1 or more of the following dose reduction techniques: Automated exposure control, adjustment of mA and/or kv according to patient's size, or use of iterative reconstruction techniques.     FINDINGS: The liver, gallbladder,  common bile duct, pancreas, spleen, and adrenal glands are unremarkable.   The kidneys enhance symmetrically. No urolithiasis is seen. No hydroureteronephrosis is seen.   The visualized aorta is unremarkable.   The small bowel is not dilated. Again seen is a pericolonic abscess in the mid sigmoid colon with diffuse circumferential wall thickening and serosal phlegmonous changes. There is a serosal based collection measuring 2.7 x 2.6 cm as well as smaller collections along the serosal surface. Overall the degree of phlegmonous changes stable to minimally worsened since the prior study.   The small abscess abuts the dome of the bladder without evidence of a fistulous tract.   No free intraperitoneal air or fluid is seen.   The bladder is well distended with no gross wall thickening.   The visualized osseous structures are intact.   Limited images of the lower thorax are unremarkable.       Again seen is a  pericolonic abscess in the mid sigmoid colon with diffuse circumferential wall thickening and serosal phlegmonous changes. There is a serosal based collection measuring 2.7 x 2.6 cm as well as smaller collections along the serosal surface. Overall the degree of phlegmonous changes stable to minimally worsened since the prior study.   Signed by: Richard Gilman 12/10/2023 11:07 PM Dictation workstation:   [id number]    CT guided percutaneous peritoneal or retroperitoneal fluid collection drainage    Result Date: 11/30/2023  Interpreted By:  Mich Reveles, STUDY: CT GUIDED PERCUTANEOUS PERITONEAL OR RETROPERITONEAL FLUID COLLECTION DRAINAGE; 11/28/2023 4:20 pm   INDICATION: Signs/Symptoms:diverticular abcess.   COMPARISON: 6 hours earlier same day   ACCESSION NUMBER(S): [id number]   ORDERING CLINICIAN: MICH REVELES   TECHNIQUE: Axial CT images of the pelvis in preparation for abscess drainage   FINDINGS: Informed consent obtained. Patient positioned supine. Axial CT images of the pelvis obtained in preparation for drainage of previously identified small diverticular abscess. The exam reveals interval reduction in volume of abscess adjacent proximal-mid sigmoid colon, now 1.9 cm. Given improvement in a very short time on antibiotics the drainage was deferred.       Reduction in size of small diverticular abscess sigmoid colon, plain drainage was deferred.   Signed by: Mich Reveles 11/30/2023 2:13 PM Dictation workstation:   [id number]    CT abdomen pelvis w IV contrast    Result Date: 11/28/2023  Interpreted By:  Richard Gilman, STUDY: CT ABDOMEN PELVIS W IV CONTRAST; 11/28/2023 10:22 am   INDICATION: Signs/Symptoms:hx of diverticulitis, left lower quadrant pain;   COMPARISON: 07/10/2020   ACCESSION NUMBER(S): [id number]   ORDERING CLINICIAN: KAVON RIZZO   TECHNIQUE: Contiguous axial images of the abdomen/pelvis were performed with IV contrast. 75 ml of Omnipaque 350 was utilized. Coronal and  sagittal reformatted images were also obtained. All CT examinations are performed with 1 or more of the following dose reduction techniques: Automated exposure control, adjustment of mA and/or kv according to patient's size, or use of iterative reconstruction techniques.     FINDINGS: The liver, gallbladder,  common bile duct, pancreas, spleen, and adrenal glands are unremarkable.   The kidneys enhance symmetrically. No urolithiasis is seen. No hydroureteronephrosis is seen.   The visualized aorta is unremarkable.   The small bowel is not dilated. The appendix is not visualized. There is acute diverticulitis at the mid-distal sigmoid colon. There is abscess formation along the serosal surface width fluid and gas formation. The abscess measures approximately 4.4 x 3.4 cm in maximum dimensions.   The bladder is normally distended with no gross wall thickening.   The visualized osseous structures are intact.   Limited images of the lower thorax are unremarkable.       acute diverticulitis at the mid-distal sigmoid colon. There is abscess formation along the serosal surface width fluid and gas formation. The abscess measures approximately 4.4 x 3.4 cm in maximum dimensions.   Signed by: Richard Gilman 11/28/2023 11:18 AM Dictation workstation:   [id number]     Assessment/Plan   Diverticulitis with abscess  Diarrhea-rule out infectious etiology    Discontinue IV ciprofloxacin  Discontinue IV flagyl  Start IV zosyn  Monitor stool  IR consulted for drainage  Monitor temp and wbc  Supportive care  Follow up c-difficile PCR  Follow up Stool pathogen PCR  General surgery follow up      Doris Burns, APRN-CNP

## 2023-12-11 NOTE — CONSULTS
"Vancomycin Dosing by Pharmacy- INITIAL    Tom Treviño is a 53 y.o. year old male who Pharmacy has been consulted for vancomycin dosing for other abdominal infection . Based on the patient's indication and renal status this patient will be dosed based on a goal AUC of 400-600.     Renal function trend is currently unknown.    Visit Vitals  /77 (BP Location: Right arm, Patient Position: Sitting)   Pulse 79   Temp 36.6 °C (97.9 °F) (Temporal)   Resp 16        Lab Results   Component Value Date    CREATININE 1.00 12/10/2023    CREATININE 1.20 11/30/2023    CREATININE 1.10 11/29/2023    CREATININE 1.10 11/28/2023        Patient weight is No results found for: \"PTWEIGHT\"    No results found for: \"CULTURE\"     No intake/output data recorded.  [unfilled]    No results found for: \"PATIENTTEMP\"       Assessment/Plan     Patient will not be given a loading dose.  Will initiate vancomycin maintenance, a one time dose of 2000 mg.  Will continue to monitor renal function daily while on vancomycin and order serum creatinine at least every 48 hours if not already ordered.  Follow for continued vancomycin needs, clinical response, and signs/symptoms of toxicity.       AVERY BRIOT, PharmD       "

## 2023-12-11 NOTE — H&P
[facility] [place]        Hospitalist History and Physical       History Of Present Illness  Tom Treviño is a 53 y.o. male presenting with complaints of increased abdominal pain. He was recently hospitalized with similar issue. IR was consulted and since abscess was improving, they elected not to drain. During that course, the patient was treated with IV zosyn and sent home on augmentin in which he completed 10 or the 12 day course.  The patient states that within the last 24-48 hours, the patients abdominal pain became much worse. No fever or chills.     ROS   A complete 10 point ROS was preformed and negative unless previously stated      Past Medical History  Past Medical History:   Diagnosis Date    COVID-19     COVID-19    Diverticulitis     GERD (gastroesophageal reflux disease)     HTN (hypertension)        Surgical History   has a past surgical history that includes Appendectomy (02/17/2015) and CT guided percutaneous peritoneal or retroperitoneal fluid collection drainage (11/28/2023).     Social History   reports that he has never smoked. He has never used smokeless tobacco. He reports current alcohol use. He reports that he does not use drugs.    Family History  HTN     Allergies  Esomeprazole magnesium    Home Meds   Prior to Admission medications    Medication Sig Start Date End Date Taking? Authorizing Provider   amoxicillin-pot clavulanate (Augmentin) 875-125 mg tablet Take 1 tablet (875 mg) by mouth 2 times a day for 12 days. 11/30/23 12/12/23  ROWDY Weber-YOVANA   hydroCHLOROthiazide (HYDRODiuril) 25 mg tablet TAKE 1 TABLET BY MOUTH EVERY DAY 10/30/23   Murphy Lenz MD   lansoprazole (Prevacid) 30 mg DR capsule Take 1 capsule (30 mg) by mouth once daily in the morning. Take before meals. 1/6/17   Historical Provider, MD            Results   Lab Results   Component Value Date    GLUCOSE 107 (H) 12/10/2023    CALCIUM 9.1 12/10/2023  "    12/10/2023    K 3.3 (L) 12/10/2023    CO2 25 12/10/2023    CL 99 12/10/2023    BUN 12 12/10/2023    CREATININE 1.00 12/10/2023     Lab Results   Component Value Date    WBC 12.4 (H) 12/10/2023    HGB 15.7 12/10/2023    HCT 45.0 12/10/2023    MCV 91 12/10/2023     12/10/2023     Lab Results   Component Value Date    HGBA1C 5.6 04/01/2023         Physical Exam        Last Recorded Vitals  Blood pressure 132/77, pulse 79, temperature 36.6 °C (97.9 °F), temperature source Temporal, resp. rate 16, height 1.88 m (6' 2\"), weight 118 kg (259 lb 4.2 oz), SpO2 99 %.    Gen- Alert and oriented x 3, appears stated Age  Eyes- EOMI, No pale conjunctiva  HENT- Head atraumatic, oral mucosa moist   Neck- Trachea midline. No carotid bruit   Heart- RRR no murmur No LE edema   Lungs CTA b/l no resp distress. RA oxygen   Abd- soft, mildly tender, BS x 4   Neuro- grossly intact. No focal deficits.   Musculo- UE 5/5 symmetric  LE 5/5 symmetric   Psych- Alert and oriented x 3. Mood and affect normal   Skin- intact and dry. No ulcers or rashes      Assessment and Plan     Diverticulitis with abscess  Last visit was treated with IV zosyn and augmentin, so this visit will do cipro and flagyl   Consult Gen surgery   Consult ID   Abdominal Pain   HTN   Hydrochlorothiazide             I discussed Code status with patient and they are FULL CODE       Aidee Santiago, DO  12/11/23      "

## 2023-12-11 NOTE — ED TRIAGE NOTES
Patient reports continued lower abdominal pain after being on Augmentin for 10 days for an abdominal abscess that he was treated for here at . He states today is the 10th day and he did not take his pills today in case it would interfere with any testing we would do. He complains of intermittent, sharp, cramping pain across his lower abdomen that is now as bad as it was prior to starting treatment on it 2 weeks ago. He reports the pain gets up to 6/10. Patient states he has not been having normal bowel movements and that they have been small and loose. He states he had 3 small, loose BMs today and that when he has one, it tends to help reduce his pain. He states he does not have to strain to have a BM. He states he has been on a Diverticulitis diet recently. He denies any nausea, vomiting, chest pain or shortness of breath.

## 2023-12-12 LAB
ALBUMIN SERPL-MCNC: 3.4 G/DL (ref 3.5–5)
ALP BLD-CCNC: 84 U/L (ref 35–125)
ALT SERPL-CCNC: 27 U/L (ref 5–40)
ANION GAP SERPL CALC-SCNC: 10 MMOL/L
AST SERPL-CCNC: 14 U/L (ref 5–40)
BASOPHILS # BLD AUTO: 0.03 X10*3/UL (ref 0–0.1)
BASOPHILS NFR BLD AUTO: 0.4 %
BILIRUB SERPL-MCNC: 1 MG/DL (ref 0.1–1.2)
BUN SERPL-MCNC: 9 MG/DL (ref 8–25)
CALCIUM SERPL-MCNC: 9.1 MG/DL (ref 8.5–10.4)
CHLORIDE SERPL-SCNC: 104 MMOL/L (ref 97–107)
CO2 SERPL-SCNC: 27 MMOL/L (ref 24–31)
CREAT SERPL-MCNC: 1.3 MG/DL (ref 0.4–1.6)
EOSINOPHIL # BLD AUTO: 0.11 X10*3/UL (ref 0–0.7)
EOSINOPHIL NFR BLD AUTO: 1.3 %
ERYTHROCYTE [DISTWIDTH] IN BLOOD BY AUTOMATED COUNT: 11.7 % (ref 11.5–14.5)
GFR SERPL CREATININE-BSD FRML MDRD: 65 ML/MIN/1.73M*2
GLUCOSE SERPL-MCNC: 101 MG/DL (ref 65–99)
HCT VFR BLD AUTO: 39.3 % (ref 41–52)
HGB BLD-MCNC: 13.4 G/DL (ref 13.5–17.5)
IMM GRANULOCYTES # BLD AUTO: 0.03 X10*3/UL (ref 0–0.7)
IMM GRANULOCYTES NFR BLD AUTO: 0.4 % (ref 0–0.9)
LYMPHOCYTES # BLD AUTO: 2.56 X10*3/UL (ref 1.2–4.8)
LYMPHOCYTES NFR BLD AUTO: 30.7 %
MCH RBC QN AUTO: 32.1 PG (ref 26–34)
MCHC RBC AUTO-ENTMCNC: 34.1 G/DL (ref 32–36)
MCV RBC AUTO: 94 FL (ref 80–100)
MONOCYTES # BLD AUTO: 0.78 X10*3/UL (ref 0.1–1)
MONOCYTES NFR BLD AUTO: 9.4 %
NEUTROPHILS # BLD AUTO: 4.82 X10*3/UL (ref 1.2–7.7)
NEUTROPHILS NFR BLD AUTO: 57.8 %
NRBC BLD-RTO: 0 /100 WBCS (ref 0–0)
PLATELET # BLD AUTO: 231 X10*3/UL (ref 150–450)
POTASSIUM SERPL-SCNC: 3.4 MMOL/L (ref 3.4–5.1)
PROT SERPL-MCNC: 6.9 G/DL (ref 5.9–7.9)
RBC # BLD AUTO: 4.18 X10*6/UL (ref 4.5–5.9)
SODIUM SERPL-SCNC: 141 MMOL/L (ref 133–145)
WBC # BLD AUTO: 8.3 X10*3/UL (ref 4.4–11.3)

## 2023-12-12 PROCEDURE — 2500000004 HC RX 250 GENERAL PHARMACY W/ HCPCS (ALT 636 FOR OP/ED): Performed by: INTERNAL MEDICINE

## 2023-12-12 PROCEDURE — 36415 COLL VENOUS BLD VENIPUNCTURE: CPT | Performed by: INTERNAL MEDICINE

## 2023-12-12 PROCEDURE — 1100000001 HC PRIVATE ROOM DAILY

## 2023-12-12 PROCEDURE — 85025 COMPLETE CBC W/AUTO DIFF WBC: CPT | Performed by: INTERNAL MEDICINE

## 2023-12-12 PROCEDURE — 80053 COMPREHEN METABOLIC PANEL: CPT | Performed by: INTERNAL MEDICINE

## 2023-12-12 PROCEDURE — 2500000004 HC RX 250 GENERAL PHARMACY W/ HCPCS (ALT 636 FOR OP/ED): Performed by: NURSE PRACTITIONER

## 2023-12-12 PROCEDURE — 94760 N-INVAS EAR/PLS OXIMETRY 1: CPT

## 2023-12-12 PROCEDURE — 2500000001 HC RX 250 WO HCPCS SELF ADMINISTERED DRUGS (ALT 637 FOR MEDICARE OP): Performed by: INTERNAL MEDICINE

## 2023-12-12 RX ADMIN — PIPERACILLIN SODIUM AND TAZOBACTAM SODIUM 3.38 G: 3; .375 INJECTION, SOLUTION INTRAVENOUS at 13:03

## 2023-12-12 RX ADMIN — PIPERACILLIN SODIUM AND TAZOBACTAM SODIUM 3.38 G: 3; .375 INJECTION, SOLUTION INTRAVENOUS at 20:16

## 2023-12-12 RX ADMIN — PIPERACILLIN SODIUM AND TAZOBACTAM SODIUM 3.38 G: 3; .375 INJECTION, SOLUTION INTRAVENOUS at 08:58

## 2023-12-12 RX ADMIN — PIPERACILLIN SODIUM AND TAZOBACTAM SODIUM 3.38 G: 3; .375 INJECTION, SOLUTION INTRAVENOUS at 01:53

## 2023-12-12 RX ADMIN — ACETAMINOPHEN 650 MG: 325 TABLET ORAL at 23:30

## 2023-12-12 RX ADMIN — HYDROCHLOROTHIAZIDE 25 MG: 25 TABLET ORAL at 08:58

## 2023-12-12 RX ADMIN — IBUPROFEN 400 MG: 400 TABLET, FILM COATED ORAL at 01:54

## 2023-12-12 RX ADMIN — PANTOPRAZOLE SODIUM 40 MG: 40 TABLET, DELAYED RELEASE ORAL at 05:47

## 2023-12-12 RX ADMIN — SODIUM CHLORIDE AND POTASSIUM CHLORIDE 100 ML/HR: 9; 1.49 INJECTION, SOLUTION INTRAVENOUS at 20:16

## 2023-12-12 RX ADMIN — SODIUM CHLORIDE AND POTASSIUM CHLORIDE 100 ML/HR: 9; 1.49 INJECTION, SOLUTION INTRAVENOUS at 06:04

## 2023-12-12 ASSESSMENT — COGNITIVE AND FUNCTIONAL STATUS - GENERAL
MOBILITY SCORE: 24
MOBILITY SCORE: 24
DAILY ACTIVITIY SCORE: 24
DAILY ACTIVITIY SCORE: 24

## 2023-12-12 ASSESSMENT — PAIN SCALES - GENERAL
PAINLEVEL_OUTOF10: 1
PAINLEVEL_OUTOF10: 0 - NO PAIN

## 2023-12-12 NOTE — NURSING NOTE
Assumed care of patient. Patient is A&Ox3 and is sitting in a chair watching tv. Call light in reach. Patient states pain at 2/10.

## 2023-12-12 NOTE — NURSING NOTE
This RN called IR to discuss drainage of abscess. Per IR nurse yesterday Dr. Javier reviewed scans yesterday and the abscess is too small to drain. This RN informed Mai Wells and Dr. Higgins.

## 2023-12-12 NOTE — PROGRESS NOTES
Tom Treviño is a 53 y.o. male on day 1 of admission presenting with Diverticulitis.      Subjective   Spoke with IR physician who assess abscess too small to drain updated surgeon and awaiting further discussion.  Patient with minimal pain       Objective     Last Recorded Vitals  /68 (BP Location: Right arm, Patient Position: Lying)   Pulse 62   Temp 36.3 °C (97.3 °F) (Oral) Comment: just ate ice chips  Resp 18   Wt 118 kg (259 lb 4.2 oz)   SpO2 99%   Intake/Output last 3 Shifts:    Intake/Output Summary (Last 24 hours) at 12/12/2023 1150  Last data filed at 12/12/2023 1058  Gross per 24 hour   Intake 2938.33 ml   Output --   Net 2938.33 ml       Physical Exam  Alert Freddy x 3 cooperative  Chest clear  Heart regular  Abdomen soft minimally tender left lower quadrant no rebound no guarding neurologic exam nonfocal  Relevant Results  Labs reviewed  Assessment/Plan     Principal Problem:    Diverticulitis      Awaiting advise drom surgery as apparently IR cannot drain the abscess which is too small             Ben Higgins MD

## 2023-12-12 NOTE — CARE PLAN
The patient's goals for the shift include      The clinical goals for the shift include pain free  Pain reduction

## 2023-12-12 NOTE — PROGRESS NOTES
Tom Treviño is a 53 y.o. male on day 1 of admission presenting with Diverticulitis.    Subjective   Says feels pretty much the same as yesterday.  Abdominal pain since seen yesterday is the same location of left lower quadrant and the same intensity that he says is not even a zero but he knows its there.  Denies any nausea, vomiting, chills.  Passing gas.  Moving his bowels-since seen yesterday said has gone a little bit every couple of hours that is solid without blood.  Feels a little bloated.  Also hungry.  No symptoms from his umbilical hernia.       Objective     Vital signs in last 24 hours:  Temp:  [36.3 °C (97.3 °F)-36.9 °C (98.4 °F)] 36.3 °C (97.3 °F)  Heart Rate:  [62-72] 62  Resp:  [18] 18  BP: (106-132)/(68-82) 106/68  Heart Rate:  [62-72]   Temp:  [36.3 °C (97.3 °F)-36.9 °C (98.4 °F)]   Resp:  [18]   BP: (106-132)/(68-82)   SpO2:  [98 %-99 %]      Intake/Output last 3 Shifts:  I/O last 3 completed shifts:  In: 1350 (11.5 mL/kg) [IV Piggyback:1350]  Out: - (0 mL/kg)   Weight: 117.6 kg     Physical Exam  No acute distress  Not septic appearing  Looks fine and comfortable  Alert and oriented  Heart regular  Lungs clear  No edema  Abdomen soft, positive bowel sounds, nondistended, nontender except for only suprapubic patient rates a 1 with no guarding or rebound and it does not cause the patient any abdominal pain when I shake his bed a little bit.  Small benign umbilical hernia.    Scheduled medications  enoxaparin, 40 mg, subcutaneous, q24h  hydroCHLOROthiazide, 25 mg, oral, Daily  pantoprazole, 40 mg, oral, Daily before breakfast  piperacillin-tazobactam, 3.375 g, intravenous, q6h  polyethylene glycol, 17 g, oral, Daily      Continuous medications  potassium chloride in 0.9%NaCl, 100 mL/hr, Last Rate: 100 mL/hr (12/12/23 1058)      PRN medications  PRN medications: acetaminophen **OR** acetaminophen **OR** acetaminophen, ibuprofen, morphine, ondansetron ODT **OR** ondansetron    Relevant  Results  Results from last 7 days   Lab Units 12/12/23  0510 12/11/23  0424 12/10/23  2132   WBC AUTO x10*3/uL 8.3 11.0 12.4*   HEMOGLOBIN g/dL 13.4* 14.9 15.7   HEMATOCRIT % 39.3* 43.7 45.0   PLATELETS AUTO x10*3/uL 231 265 299      Results from last 7 days   Lab Units 12/12/23  0510 12/11/23  0424 12/10/23  2132   SODIUM mmol/L 141 139 138   POTASSIUM mmol/L 3.4 3.6 3.3*   CHLORIDE mmol/L 104 103 99   CO2 mmol/L 27 27 25   BUN mg/dL 9 13 12   CREATININE mg/dL 1.30 1.20 1.00   GLUCOSE mg/dL 101* 109* 107*   CALCIUM mg/dL 9.1 8.5 9.1        CT abdomen pelvis w IV contrast  Result Date: 12/10/2023  Interpreted By:  Richard Gilman, STUDY: CT ABDOMEN PELVIS W IV CONTRAST; 12/10/2023 10:14 pm   INDICATION: Abscess left side of abdomen, continued abdominal pain   COMPARISON: 11/28/2023   ACCESSION NUMBER(S): [id number]   ORDERING CLINICIAN: JASON OLIVAS   TECHNIQUE: Contiguous axial images of the abdomen/pelvis were performed with IV contrast. 75 ml of Omnipaque 350 was utilized. Coronal and sagittal reformatted images were also obtained. All CT examinations are performed with 1 or more of the following dose reduction techniques: Automated exposure control, adjustment of mA and/or kv according to patient's size, or use of iterative reconstruction techniques.     FINDINGS: The liver, gallbladder,  common bile duct, pancreas, spleen, and adrenal glands are unremarkable.   The kidneys enhance symmetrically. No urolithiasis is seen. No hydroureteronephrosis is seen.   The visualized aorta is unremarkable.   The small bowel is not dilated. Again seen is a pericolonic abscess in the mid sigmoid colon with diffuse circumferential wall thickening and serosal phlegmonous changes. There is a serosal based collection measuring 2.7 x 2.6 cm as well as smaller collections along the serosal surface. Overall the degree of phlegmonous changes stable to minimally worsened since the prior study.   The small abscess abuts the dome of  the bladder without evidence of a fistulous tract.   No free intraperitoneal air or fluid is seen.   The bladder is well distended with no gross wall thickening.   The visualized osseous structures are intact.   Limited images of the lower thorax are unremarkable.       Again seen is a pericolonic abscess in the mid sigmoid colon with diffuse circumferential wall thickening and serosal phlegmonous changes. There is a serosal based collection measuring 2.7 x 2.6 cm as well as smaller collections along the serosal surface. Overall the degree of phlegmonous changes stable to minimally worsened since the prior study.   Signed by: Richard Gilman 12/10/2023 11:07 PM Dictation workstation:   [id number]       Assessment/Plan   Diverticulitis of intestine with abscesses  N.p.o.  IV fluids  Antibiotics per ID-on Zosyn  IR consulted for drainage-no notes as of this morning-patient's nurse called IR today and states was told that IR yesterday Dr Javier said the abscess was too small to drain-discussed with surgeon-surgeon spoke with IR today who indicates abscess is too small to drain at this time.  Therefore we will continue with IV antibiotics.  If he does not improve after 4 days or so we will repeat a CT scan to see if the abscess area is increasing  Morning labs ordered  Discussed with patient if he gets better without surgery then he needs to have a colonoscopy in 6 to 8 weeks down the road to make sure that this is only diverticulitis and not a perforated colon cancer  Recommend to discontinue MiraLAX-will defer to IM who ordered it  Morning labs ordered  No acute surgical indications at present  Will follow     Upon discharge:   1-soft low fiber low residue diet for 2 weeks   2-avoid constipation:   -drink 8 glasses of water per day   -after 2 weeks take Metamucil daily   3-follow-up with your gastroenterologist to have an elective colonoscopy in 6 to 8 weeks to make sure that this is only diverticulitis and not a  perforated colon cancer   4-follow-up with a colorectal surgeon (Dr Ayse Persaud or Dr Paras Nunez) after has colonoscopy to have an elective colon resection   5-no nuts or seeds      Umbilical hernia  Benign  No acute surgical indications for at present     DVT prophylaxis  Per IM-on Lovenox    1704 addendum-okay for clear liquid diet.    Mai Wells, APRN-CNP

## 2023-12-12 NOTE — PROGRESS NOTES
Tom Treviño is a 53 y.o. male on day 1 of admission presenting with Diverticulitis.    Subjective   Interval History:   Afebrile, no chills  Reports mild left sided abdominal discomfort  Denies nausea, vomiting or diarrhea  Denies shortness of breath or chest pain          Objective   Range of Vitals (last 24 hours)  Heart Rate:  [62-72]   Temp:  [36.3 °C (97.3 °F)-36.9 °C (98.4 °F)]   Resp:  [18]   BP: (106-132)/(68-82)   SpO2:  [98 %-99 %]   Daily Weight  12/10/23 : 118 kg (259 lb 4.2 oz)    Body mass index is 33.29 kg/m².    Physical Exam  Constitutional:       Appearance: Normal appearance.   HENT:      Head: Normocephalic and atraumatic.      Nose: Nose normal.      Mouth/Throat:      Mouth: Mucous membranes are moist.      Pharynx: Oropharynx is clear.   Eyes:      Extraocular Movements: Extraocular movements intact.      Conjunctiva/sclera: Conjunctivae normal.   Cardiovascular:      Rate and Rhythm: Normal rate and regular rhythm.   Pulmonary:      Effort: Pulmonary effort is normal.      Breath sounds: Normal breath sounds.   Abdominal:      General: Bowel sounds are normal.      Palpations: Abdomen is soft.   Musculoskeletal:         General: Normal range of motion.      Cervical back: Normal range of motion and neck supple.   Skin:     General: Skin is warm and dry.   Neurological:      General: No focal deficit present.      Mental Status: He is alert and oriented to person, place, and time. Mental status is at baseline.   Psychiatric:         Mood and Affect: Mood normal.         Behavior: Behavior normal.     Antibiotics  iohexol (OMNIPaque) 350 mg iodine/mL solution 75 mL  dicyclomine (Bentyl) tablet 20 mg  sodium chloride 0.9 % bolus 500 mL  ondansetron (Zofran) injection 4 mg  ketorolac (Toradol) injection 15 mg  famotidine PF (Pepcid) injection 20 mg  piperacillin-tazobactam-dextrose (Zosyn) IV 3.375 g  hydroCHLOROthiazide (HYDRODiuril) tablet 25 mg  lansoprazole (Prevacid) DR capsule 30  "mg  ciprofloxacin (Cipro) IVPB 400 mg  metroNIDAZOLE in NaCl (iso-os) (Flagyl)  mg  enoxaparin (Lovenox) syringe 40 mg  sodium chloride 0.9 % with KCl 20 mEq/L infusion  acetaminophen (Tylenol) tablet 650 mg  acetaminophen (Tylenol) oral liquid 650 mg  acetaminophen (Tylenol) suppository 650 mg  acetaminophen (Tylenol) tablet 650 mg  acetaminophen (Tylenol) oral liquid 650 mg  acetaminophen (Tylenol) suppository 650 mg  ibuprofen tablet 400 mg  polyethylene glycol (Glycolax, Miralax) packet 17 g  ondansetron ODT (Zofran-ODT) disintegrating tablet 4 mg  ondansetron (Zofran) injection 4 mg  morphine injection 4 mg  vancomycin-diluent combo no.1 (Xellia) IVPB 2,000 mg  pantoprazole (ProtoNix) EC tablet 40 mg  piperacillin-tazobactam-dextrose (Zosyn) IV 3.375 g      Relevant Results  Labs  Results from last 72 hours   Lab Units 12/12/23  0510 12/11/23  0424 12/10/23  2132   WBC AUTO x10*3/uL 8.3 11.0 12.4*   HEMOGLOBIN g/dL 13.4* 14.9 15.7   HEMATOCRIT % 39.3* 43.7 45.0   PLATELETS AUTO x10*3/uL 231 265 299   NEUTROS PCT AUTO % 57.8  --   --    LYMPHS PCT AUTO % 30.7  --   --    MONOS PCT AUTO % 9.4  --   --    EOS PCT AUTO % 1.3  --   --      Results from last 72 hours   Lab Units 12/12/23  0510 12/11/23  0424 12/10/23  2132   SODIUM mmol/L 141 139 138   POTASSIUM mmol/L 3.4 3.6 3.3*   CHLORIDE mmol/L 104 103 99   CO2 mmol/L 27 27 25   BUN mg/dL 9 13 12   CREATININE mg/dL 1.30 1.20 1.00   GLUCOSE mg/dL 101* 109* 107*   CALCIUM mg/dL 9.1 8.5 9.1   ANION GAP mmol/L 10 9 14   EGFR mL/min/1.73m*2 65 71 89     Results from last 72 hours   Lab Units 12/12/23  0510 12/10/23  2132   ALK PHOS U/L 84 97   BILIRUBIN TOTAL mg/dL 1.0 0.2   PROTEIN TOTAL g/dL 6.9 7.7   ALT U/L 27 44*   AST U/L 14 23   ALBUMIN g/dL 3.4* 4.0     Estimated Creatinine Clearance: 87.6 mL/min (by C-G formula based on SCr of 1.3 mg/dL).  No results found for: \"CRP\"  Microbiology  No results found for the last 14 days.  Stool pathogen PCR negative "     Imaging  CT abdomen pelvis w IV contrast    Result Date: 12/10/2023  Interpreted By:  Richard Gilman, STUDY: CT ABDOMEN PELVIS W IV CONTRAST; 12/10/2023 10:14 pm   INDICATION: Abscess left side of abdomen, continued abdominal pain   COMPARISON: 11/28/2023   ACCESSION NUMBER(S): [id number]   ORDERING CLINICIAN: JASON OLIVAS   TECHNIQUE: Contiguous axial images of the abdomen/pelvis were performed with IV contrast. 75 ml of Omnipaque 350 was utilized. Coronal and sagittal reformatted images were also obtained. All CT examinations are performed with 1 or more of the following dose reduction techniques: Automated exposure control, adjustment of mA and/or kv according to patient's size, or use of iterative reconstruction techniques.     FINDINGS: The liver, gallbladder,  common bile duct, pancreas, spleen, and adrenal glands are unremarkable.   The kidneys enhance symmetrically. No urolithiasis is seen. No hydroureteronephrosis is seen.   The visualized aorta is unremarkable.   The small bowel is not dilated. Again seen is a pericolonic abscess in the mid sigmoid colon with diffuse circumferential wall thickening and serosal phlegmonous changes. There is a serosal based collection measuring 2.7 x 2.6 cm as well as smaller collections along the serosal surface. Overall the degree of phlegmonous changes stable to minimally worsened since the prior study.   The small abscess abuts the dome of the bladder without evidence of a fistulous tract.   No free intraperitoneal air or fluid is seen.   The bladder is well distended with no gross wall thickening.   The visualized osseous structures are intact.   Limited images of the lower thorax are unremarkable.       Again seen is a pericolonic abscess in the mid sigmoid colon with diffuse circumferential wall thickening and serosal phlegmonous changes. There is a serosal based collection measuring 2.7 x 2.6 cm as well as smaller collections along the serosal surface.  Overall the degree of phlegmonous changes stable to minimally worsened since the prior study.   Signed by: Richard Gilman 12/10/2023 11:07 PM Dictation workstation:   [id number]    CT guided percutaneous peritoneal or retroperitoneal fluid collection drainage    Result Date: 11/30/2023  Interpreted By:  Mich Reveles, STUDY: CT GUIDED PERCUTANEOUS PERITONEAL OR RETROPERITONEAL FLUID COLLECTION DRAINAGE; 11/28/2023 4:20 pm   INDICATION: Signs/Symptoms:diverticular abcess.   COMPARISON: 6 hours earlier same day   ACCESSION NUMBER(S): [id number]   ORDERING CLINICIAN: MICH REVELES   TECHNIQUE: Axial CT images of the pelvis in preparation for abscess drainage   FINDINGS: Informed consent obtained. Patient positioned supine. Axial CT images of the pelvis obtained in preparation for drainage of previously identified small diverticular abscess. The exam reveals interval reduction in volume of abscess adjacent proximal-mid sigmoid colon, now 1.9 cm. Given improvement in a very short time on antibiotics the drainage was deferred.       Reduction in size of small diverticular abscess sigmoid colon, plain drainage was deferred.   Signed by: Mich Reveles 11/30/2023 2:13 PM Dictation workstation:   [id number]    CT abdomen pelvis w IV contrast    Result Date: 11/28/2023  Interpreted By:  Richard Gilman, STUDY: CT ABDOMEN PELVIS W IV CONTRAST; 11/28/2023 10:22 am   INDICATION: Signs/Symptoms:hx of diverticulitis, left lower quadrant pain;   COMPARISON: 07/10/2020   ACCESSION NUMBER(S): [id number]   ORDERING CLINICIAN: KAVON RIZZO   TECHNIQUE: Contiguous axial images of the abdomen/pelvis were performed with IV contrast. 75 ml of Omnipaque 350 was utilized. Coronal and sagittal reformatted images were also obtained. All CT examinations are performed with 1 or more of the following dose reduction techniques: Automated exposure control, adjustment of mA and/or kv according to patient's size, or use of  iterative reconstruction techniques.     FINDINGS: The liver, gallbladder,  common bile duct, pancreas, spleen, and adrenal glands are unremarkable.   The kidneys enhance symmetrically. No urolithiasis is seen. No hydroureteronephrosis is seen.   The visualized aorta is unremarkable.   The small bowel is not dilated. The appendix is not visualized. There is acute diverticulitis at the mid-distal sigmoid colon. There is abscess formation along the serosal surface width fluid and gas formation. The abscess measures approximately 4.4 x 3.4 cm in maximum dimensions.   The bladder is normally distended with no gross wall thickening.   The visualized osseous structures are intact.   Limited images of the lower thorax are unremarkable.       acute diverticulitis at the mid-distal sigmoid colon. There is abscess formation along the serosal surface width fluid and gas formation. The abscess measures approximately 4.4 x 3.4 cm in maximum dimensions.   Signed by: Richard Gilman 11/28/2023 11:18 AM Dictation workstation:   [id number]           Assessment/Plan   Diverticulitis with abscess  Diarrhea-rule out infectious etiology    Continue IV zosyn  Monitor stool  IR consulted for drainage-abscess too small to drain  Monitor temp and wbc  Supportive care  Follow up c-difficile PCR  General surgery follow up    Doris Burns, APRN-CNP

## 2023-12-13 LAB
ALBUMIN SERPL-MCNC: 3.3 G/DL (ref 3.5–5)
ALP BLD-CCNC: 78 U/L (ref 35–125)
ALT SERPL-CCNC: 25 U/L (ref 5–40)
ANION GAP SERPL CALC-SCNC: 9 MMOL/L
AST SERPL-CCNC: 16 U/L (ref 5–40)
BASOPHILS # BLD AUTO: 0.03 X10*3/UL (ref 0–0.1)
BASOPHILS NFR BLD AUTO: 0.5 %
BILIRUB SERPL-MCNC: 0.5 MG/DL (ref 0.1–1.2)
BUN SERPL-MCNC: 6 MG/DL (ref 8–25)
CALCIUM SERPL-MCNC: 8.6 MG/DL (ref 8.5–10.4)
CHLORIDE SERPL-SCNC: 107 MMOL/L (ref 97–107)
CO2 SERPL-SCNC: 24 MMOL/L (ref 24–31)
CREAT SERPL-MCNC: 1.1 MG/DL (ref 0.4–1.6)
EOSINOPHIL # BLD AUTO: 0.18 X10*3/UL (ref 0–0.7)
EOSINOPHIL NFR BLD AUTO: 2.8 %
ERYTHROCYTE [DISTWIDTH] IN BLOOD BY AUTOMATED COUNT: 11.8 % (ref 11.5–14.5)
GFR SERPL CREATININE-BSD FRML MDRD: 79 ML/MIN/1.73M*2
GLUCOSE SERPL-MCNC: 95 MG/DL (ref 65–99)
HCT VFR BLD AUTO: 38.9 % (ref 41–52)
HGB BLD-MCNC: 13.2 G/DL (ref 13.5–17.5)
IMM GRANULOCYTES # BLD AUTO: 0.02 X10*3/UL (ref 0–0.7)
IMM GRANULOCYTES NFR BLD AUTO: 0.3 % (ref 0–0.9)
LYMPHOCYTES # BLD AUTO: 2.59 X10*3/UL (ref 1.2–4.8)
LYMPHOCYTES NFR BLD AUTO: 40 %
MCH RBC QN AUTO: 31.7 PG (ref 26–34)
MCHC RBC AUTO-ENTMCNC: 33.9 G/DL (ref 32–36)
MCV RBC AUTO: 94 FL (ref 80–100)
MONOCYTES # BLD AUTO: 0.56 X10*3/UL (ref 0.1–1)
MONOCYTES NFR BLD AUTO: 8.6 %
NEUTROPHILS # BLD AUTO: 3.1 X10*3/UL (ref 1.2–7.7)
NEUTROPHILS NFR BLD AUTO: 47.8 %
NRBC BLD-RTO: 0 /100 WBCS (ref 0–0)
PLATELET # BLD AUTO: 236 X10*3/UL (ref 150–450)
POTASSIUM SERPL-SCNC: 3.5 MMOL/L (ref 3.4–5.1)
PROT SERPL-MCNC: 6.9 G/DL (ref 5.9–7.9)
RBC # BLD AUTO: 4.16 X10*6/UL (ref 4.5–5.9)
SODIUM SERPL-SCNC: 140 MMOL/L (ref 133–145)
WBC # BLD AUTO: 6.5 X10*3/UL (ref 4.4–11.3)

## 2023-12-13 PROCEDURE — 36415 COLL VENOUS BLD VENIPUNCTURE: CPT | Performed by: INTERNAL MEDICINE

## 2023-12-13 PROCEDURE — 85025 COMPLETE CBC W/AUTO DIFF WBC: CPT | Performed by: INTERNAL MEDICINE

## 2023-12-13 PROCEDURE — 2500000004 HC RX 250 GENERAL PHARMACY W/ HCPCS (ALT 636 FOR OP/ED): Performed by: INTERNAL MEDICINE

## 2023-12-13 PROCEDURE — 80053 COMPREHEN METABOLIC PANEL: CPT | Performed by: INTERNAL MEDICINE

## 2023-12-13 PROCEDURE — 2500000004 HC RX 250 GENERAL PHARMACY W/ HCPCS (ALT 636 FOR OP/ED): Performed by: NURSE PRACTITIONER

## 2023-12-13 PROCEDURE — 1100000001 HC PRIVATE ROOM DAILY

## 2023-12-13 PROCEDURE — 94760 N-INVAS EAR/PLS OXIMETRY 1: CPT

## 2023-12-13 RX ADMIN — PANTOPRAZOLE SODIUM 40 MG: 40 TABLET, DELAYED RELEASE ORAL at 04:59

## 2023-12-13 RX ADMIN — PIPERACILLIN SODIUM AND TAZOBACTAM SODIUM 3.38 G: 3; .375 INJECTION, SOLUTION INTRAVENOUS at 09:22

## 2023-12-13 RX ADMIN — PIPERACILLIN SODIUM AND TAZOBACTAM SODIUM 3.38 G: 3; .375 INJECTION, SOLUTION INTRAVENOUS at 14:12

## 2023-12-13 RX ADMIN — PIPERACILLIN SODIUM AND TAZOBACTAM SODIUM 3.38 G: 3; .375 INJECTION, SOLUTION INTRAVENOUS at 20:42

## 2023-12-13 RX ADMIN — PIPERACILLIN SODIUM AND TAZOBACTAM SODIUM 3.38 G: 3; .375 INJECTION, SOLUTION INTRAVENOUS at 04:59

## 2023-12-13 ASSESSMENT — PAIN SCALES - GENERAL
PAINLEVEL_OUTOF10: 0 - NO PAIN
PAINLEVEL_OUTOF10: 0 - NO PAIN

## 2023-12-13 NOTE — PROGRESS NOTES
"Tom Treviño is a 53 y.o. male on day 2 of admission presenting with Diverticulitis.    Subjective   Pt has transitioned to full liquids today. Pt remains with no skilled needs upon discharge.        Objective     Physical Exam    Last Recorded Vitals  Blood pressure 133/76, pulse 63, temperature 36.6 °C (97.9 °F), temperature source Oral, resp. rate 16, height 1.88 m (6' 2\"), weight 118 kg (259 lb 4.2 oz), SpO2 99 %.  Intake/Output last 3 Shifts:  I/O last 3 completed shifts:  In: 2938.3 (25 mL/kg) [I.V.:2838.3 (24.1 mL/kg); IV Piggyback:100]  Out: - (0 mL/kg)   Weight: 117.6 kg     Relevant Results                             Assessment/Plan   Principal Problem:    Diverticulitis               SADIA Brasher      "

## 2023-12-13 NOTE — PROGRESS NOTES
Tom Treviño is a 53 y.o. male on day 2 of admission presenting with Diverticulitis.      Subjective   He has tolerated liquid diet no particular cramping no diarrhea       Objective     Last Recorded Vitals  /76 (BP Location: Left arm, Patient Position: Sitting)   Pulse 63   Temp 36.6 °C (97.9 °F) (Oral)   Resp 16   Wt 118 kg (259 lb 4.2 oz)   SpO2 99%   Intake/Output last 3 Shifts:    Intake/Output Summary (Last 24 hours) at 12/13/2023 0743  Last data filed at 12/12/2023 1058  Gross per 24 hour   Intake 2838.33 ml   Output --   Net 2838.33 ml       Physical Exam  Sitting up in the chair doing well  Chest clear  Heart regular  Abdomen soft not particularly tender  Extremities no edema  Neurologic exam gross nonfocal  Relevant Results  Reviewed a.m. labs  Assessment/Plan     Principal Problem:    Diverticulitis      As the abscess is too small to drain proposed plan from surgery is to continue antibiotics and gradually increase that if symptoms recur will rescan.  I will increase that to full liquids stop the maintenance fluids as I believe he takes enough orally and he is okay to have a shower             Ben Higgins MD

## 2023-12-13 NOTE — PROGRESS NOTES
Tom Treviño is a 53 y.o. male on day 2 of admission presenting with Diverticulitis.    Subjective   Feels good and the same as yesterday.  Taking clear liquids good without any issues.  Says that IM saw him this morning and is going to be increasing his diet to full liquids for lunch.  Denies any abdominal pain since seen yesterday said that has just had some crampiness but no pain to only his left lower quadrant that is the same as yesterday.  No nausea, vomiting, or chills since seen yesterday.  Passing gas and moving his bowels.  No symptoms from his umbilical hernia.       Objective     Vital signs in last 24 hours:  Temp:  [36.4 °C (97.5 °F)-36.8 °C (98.2 °F)] 36.6 °C (97.9 °F)  Heart Rate:  [63-66] 63  Resp:  [16] 16  BP: (106-133)/(67-76) 133/76  Heart Rate:  [63-66]   Temp:  [36.4 °C (97.5 °F)-36.8 °C (98.2 °F)]   Resp:  [16]   BP: (106-133)/(67-76)   SpO2:  [98 %-99 %]      Intake/Output last 3 Shifts:  I/O last 3 completed shifts:  In: 2938.3 (25 mL/kg) [I.V.:2838.3 (24.1 mL/kg); IV Piggyback:100]  Out: - (0 mL/kg)   Weight: 117.6 kg     Physical Exam  No acute distress  Not septic appearing  Looks fine and comfortable  Alert and oriented  Heart regular  Lungs clear  No edema  Abdomen soft, positive bowel sounds, nondistended, nontender, and it does not cause the patient any abdominal pain when I shake his bed a little bit.  Small benign umbilical hernia.    Scheduled medications  enoxaparin, 40 mg, subcutaneous, q24h  pantoprazole, 40 mg, oral, Daily before breakfast  piperacillin-tazobactam, 3.375 g, intravenous, q6h  polyethylene glycol, 17 g, oral, Daily      Continuous medications     PRN medications  PRN medications: acetaminophen **OR** acetaminophen **OR** acetaminophen, ibuprofen, morphine, ondansetron ODT **OR** ondansetron    Relevant Results  Results from last 7 days   Lab Units 12/13/23  0421 12/12/23  0510 12/11/23  0424   WBC AUTO x10*3/uL 6.5 8.3 11.0   HEMOGLOBIN g/dL 13.2* 13.4* 14.9    HEMATOCRIT % 38.9* 39.3* 43.7   PLATELETS AUTO x10*3/uL 236 231 265      Results from last 7 days   Lab Units 12/13/23  0421 12/12/23  0510 12/11/23  0424   SODIUM mmol/L 140 141 139   POTASSIUM mmol/L 3.5 3.4 3.6   CHLORIDE mmol/L 107 104 103   CO2 mmol/L 24 27 27   BUN mg/dL 6* 9 13   CREATININE mg/dL 1.10 1.30 1.20   GLUCOSE mg/dL 95 101* 109*   CALCIUM mg/dL 8.6 9.1 8.5      CT abdomen pelvis w IV contrast  Result Date: 12/10/2023  Interpreted By:  Richard Gilman, STUDY: CT ABDOMEN PELVIS W IV CONTRAST; 12/10/2023 10:14 pm   INDICATION: Abscess left side of abdomen, continued abdominal pain   COMPARISON: 11/28/2023   ACCESSION NUMBER(S): [id number]   ORDERING CLINICIAN: JASON OLIVAS   TECHNIQUE: Contiguous axial images of the abdomen/pelvis were performed with IV contrast. 75 ml of Omnipaque 350 was utilized. Coronal and sagittal reformatted images were also obtained. All CT examinations are performed with 1 or more of the following dose reduction techniques: Automated exposure control, adjustment of mA and/or kv according to patient's size, or use of iterative reconstruction techniques.     FINDINGS: The liver, gallbladder,  common bile duct, pancreas, spleen, and adrenal glands are unremarkable.   The kidneys enhance symmetrically. No urolithiasis is seen. No hydroureteronephrosis is seen.   The visualized aorta is unremarkable.   The small bowel is not dilated. Again seen is a pericolonic abscess in the mid sigmoid colon with diffuse circumferential wall thickening and serosal phlegmonous changes. There is a serosal based collection measuring 2.7 x 2.6 cm as well as smaller collections along the serosal surface. Overall the degree of phlegmonous changes stable to minimally worsened since the prior study.   The small abscess abuts the dome of the bladder without evidence of a fistulous tract.   No free intraperitoneal air or fluid is seen.   The bladder is well distended with no gross wall thickening.    The visualized osseous structures are intact.   Limited images of the lower thorax are unremarkable.        Again seen is a pericolonic abscess in the mid sigmoid colon with diffuse circumferential wall thickening and serosal phlegmonous changes. There is a serosal based collection measuring 2.7 x 2.6 cm as well as smaller collections along the serosal surface. Overall the degree of phlegmonous changes stable to minimally worsened since the prior study.   Signed by: Richard Gilman 12/10/2023 11:07 PM Dictation workstation:   [id number]    Assessment/Plan   Diverticulitis of intestine with abscesses  Diet advance to full liquids this morning by IM  Antibiotics per ID-on Zosyn  IR consulted for drainage 12/11 am-as of 12/12 am there were still no notes from IR. Dr Delgado spoke with IR 12/12-fluid collection not large enough to drain. Therefore we will continue with IV antibiotics. May repeat CT scan in 2 to 3 days.  Morning labs ordered  Discussed with patient if he gets better without surgery then he needs to have a colonoscopy in 6 to 8 weeks down the road to make sure that this is only diverticulitis and not a perforated colon cancer  Recommend to discontinue MiraLAX-will defer to IM who ordered it  Morning labs ordered  No acute surgical indications at present  Will follow     Upon discharge:   1-soft low fiber low residue diet for 2 weeks   2-avoid constipation:   -drink 8 glasses of water per day   -after 2 weeks take Metamucil daily   3-follow-up with your gastroenterologist to have an elective colonoscopy in 6 to 8 weeks to make sure that this is only diverticulitis and not a perforated colon cancer   4-follow-up with a colorectal surgeon (Dr Ayse Presaud or Dr Paras Nunez) after has colonoscopy to have an elective colon resection   5-no nuts or seeds      Umbilical hernia  Benign  No acute surgical indications for at present     DVT prophylaxis  Per IM-on Lovenox     1400 addendum-seen with  [facility] surgeon-keep on full liquids for today and tomorrow-can be advanced to a soft low fiber diet on Friday if doing well    Mai Wells, APRN-CNP

## 2023-12-13 NOTE — PROGRESS NOTES
Tom Treviño is a 53 y.o. male on day 2 of admission presenting with Diverticulitis.    Subjective   Interval History:   Afebrile, no chills  Reports mild LLQ abdominal soreness-states it is improving overall  Denies nausea, vomiting or diarrhea  Denies shortness of breath or cough        Objective   Range of Vitals (last 24 hours)  Heart Rate:  [63-66]   Temp:  [36.4 °C (97.5 °F)-36.8 °C (98.2 °F)]   Resp:  [16]   BP: (106-133)/(67-76)   SpO2:  [98 %-99 %]   Daily Weight  12/10/23 : 118 kg (259 lb 4.2 oz)    Body mass index is 33.29 kg/m².    Physical Exam  Constitutional:       Appearance: Normal appearance.   HENT:      Head: Normocephalic and atraumatic.      Nose: Nose normal.      Mouth/Throat:      Mouth: Mucous membranes are moist.      Pharynx: Oropharynx is clear.   Eyes:      Extraocular Movements: Extraocular movements intact.      Conjunctiva/sclera: Conjunctivae normal.   Cardiovascular:      Rate and Rhythm: Normal rate and regular rhythm.   Pulmonary:      Effort: Pulmonary effort is normal.      Breath sounds: Normal breath sounds.   Abdominal:      General: Bowel sounds are normal.      Palpations: Abdomen is soft.   Musculoskeletal:         General: Normal range of motion.      Cervical back: Normal range of motion and neck supple.   Skin:     General: Skin is warm and dry.   Neurological:      General: No focal deficit present.      Mental Status: He is alert and oriented to person, place, and time. Mental status is at baseline.   Psychiatric:         Mood and Affect: Mood normal.         Behavior: Behavior normal.     Antibiotics  iohexol (OMNIPaque) 350 mg iodine/mL solution 75 mL  dicyclomine (Bentyl) tablet 20 mg  sodium chloride 0.9 % bolus 500 mL  ondansetron (Zofran) injection 4 mg  ketorolac (Toradol) injection 15 mg  famotidine PF (Pepcid) injection 20 mg  piperacillin-tazobactam-dextrose (Zosyn) IV 3.375 g  hydroCHLOROthiazide (HYDRODiuril) tablet 25 mg  lansoprazole (Prevacid)   "capsule 30 mg  ciprofloxacin (Cipro) IVPB 400 mg  metroNIDAZOLE in NaCl (iso-os) (Flagyl)  mg  enoxaparin (Lovenox) syringe 40 mg  sodium chloride 0.9 % with KCl 20 mEq/L infusion  acetaminophen (Tylenol) tablet 650 mg  acetaminophen (Tylenol) oral liquid 650 mg  acetaminophen (Tylenol) suppository 650 mg  acetaminophen (Tylenol) tablet 650 mg  acetaminophen (Tylenol) oral liquid 650 mg  acetaminophen (Tylenol) suppository 650 mg  ibuprofen tablet 400 mg  polyethylene glycol (Glycolax, Miralax) packet 17 g  ondansetron ODT (Zofran-ODT) disintegrating tablet 4 mg  ondansetron (Zofran) injection 4 mg  morphine injection 4 mg  vancomycin-diluent combo no.1 (Xellia) IVPB 2,000 mg  pantoprazole (ProtoNix) EC tablet 40 mg  piperacillin-tazobactam-dextrose (Zosyn) IV 3.375 g      Relevant Results  Labs  Results from last 72 hours   Lab Units 12/13/23 0421 12/12/23 0510 12/11/23  0424   WBC AUTO x10*3/uL 6.5 8.3 11.0   HEMOGLOBIN g/dL 13.2* 13.4* 14.9   HEMATOCRIT % 38.9* 39.3* 43.7   PLATELETS AUTO x10*3/uL 236 231 265   NEUTROS PCT AUTO % 47.8 57.8  --    LYMPHS PCT AUTO % 40.0 30.7  --    MONOS PCT AUTO % 8.6 9.4  --    EOS PCT AUTO % 2.8 1.3  --        Results from last 72 hours   Lab Units 12/13/23 0421 12/12/23  0510 12/11/23  0424   SODIUM mmol/L 140 141 139   POTASSIUM mmol/L 3.5 3.4 3.6   CHLORIDE mmol/L 107 104 103   CO2 mmol/L 24 27 27   BUN mg/dL 6* 9 13   CREATININE mg/dL 1.10 1.30 1.20   GLUCOSE mg/dL 95 101* 109*   CALCIUM mg/dL 8.6 9.1 8.5   ANION GAP mmol/L 9 10 9   EGFR mL/min/1.73m*2 79 65 71       Results from last 72 hours   Lab Units 12/13/23  0421 12/12/23  0510 12/10/23  2132   ALK PHOS U/L 78 84 97   BILIRUBIN TOTAL mg/dL 0.5 1.0 0.2   PROTEIN TOTAL g/dL 6.9 6.9 7.7   ALT U/L 25 27 44*   AST U/L 16 14 23   ALBUMIN g/dL 3.3* 3.4* 4.0       Estimated Creatinine Clearance: 103.6 mL/min (by C-G formula based on SCr of 1.1 mg/dL).  No results found for: \"CRP\"  Microbiology    Stool pathogen PCR " negative     Imaging  CT abdomen pelvis w IV contrast    Result Date: 12/10/2023  Interpreted By:  Richard Gilman, STUDY: CT ABDOMEN PELVIS W IV CONTRAST; 12/10/2023 10:14 pm   INDICATION: Abscess left side of abdomen, continued abdominal pain   COMPARISON: 11/28/2023   ACCESSION NUMBER(S): [id number]   ORDERING CLINICIAN: JASON OLIVAS   TECHNIQUE: Contiguous axial images of the abdomen/pelvis were performed with IV contrast. 75 ml of Omnipaque 350 was utilized. Coronal and sagittal reformatted images were also obtained. All CT examinations are performed with 1 or more of the following dose reduction techniques: Automated exposure control, adjustment of mA and/or kv according to patient's size, or use of iterative reconstruction techniques.     FINDINGS: The liver, gallbladder,  common bile duct, pancreas, spleen, and adrenal glands are unremarkable.   The kidneys enhance symmetrically. No urolithiasis is seen. No hydroureteronephrosis is seen.   The visualized aorta is unremarkable.   The small bowel is not dilated. Again seen is a pericolonic abscess in the mid sigmoid colon with diffuse circumferential wall thickening and serosal phlegmonous changes. There is a serosal based collection measuring 2.7 x 2.6 cm as well as smaller collections along the serosal surface. Overall the degree of phlegmonous changes stable to minimally worsened since the prior study.   The small abscess abuts the dome of the bladder without evidence of a fistulous tract.   No free intraperitoneal air or fluid is seen.   The bladder is well distended with no gross wall thickening.   The visualized osseous structures are intact.   Limited images of the lower thorax are unremarkable.       Again seen is a pericolonic abscess in the mid sigmoid colon with diffuse circumferential wall thickening and serosal phlegmonous changes. There is a serosal based collection measuring 2.7 x 2.6 cm as well as smaller collections along the serosal  surface. Overall the degree of phlegmonous changes stable to minimally worsened since the prior study.   Signed by: Richard Gilman 12/10/2023 11:07 PM Dictation workstation:   [id number]    CT guided percutaneous peritoneal or retroperitoneal fluid collection drainage    Result Date: 11/30/2023  Interpreted By:  Mich Reveles, STUDY: CT GUIDED PERCUTANEOUS PERITONEAL OR RETROPERITONEAL FLUID COLLECTION DRAINAGE; 11/28/2023 4:20 pm   INDICATION: Signs/Symptoms:diverticular abcess.   COMPARISON: 6 hours earlier same day   ACCESSION NUMBER(S): [id number]   ORDERING CLINICIAN: MICH REVELES   TECHNIQUE: Axial CT images of the pelvis in preparation for abscess drainage   FINDINGS: Informed consent obtained. Patient positioned supine. Axial CT images of the pelvis obtained in preparation for drainage of previously identified small diverticular abscess. The exam reveals interval reduction in volume of abscess adjacent proximal-mid sigmoid colon, now 1.9 cm. Given improvement in a very short time on antibiotics the drainage was deferred.       Reduction in size of small diverticular abscess sigmoid colon, plain drainage was deferred.   Signed by: Mich Reveles 11/30/2023 2:13 PM Dictation workstation:   [id number]    CT abdomen pelvis w IV contrast    Result Date: 11/28/2023  Interpreted By:  Richard Gilman, STUDY: CT ABDOMEN PELVIS W IV CONTRAST; 11/28/2023 10:22 am   INDICATION: Signs/Symptoms:hx of diverticulitis, left lower quadrant pain;   COMPARISON: 07/10/2020   ACCESSION NUMBER(S): [id number]   ORDERING CLINICIAN: KAVON RIZZO   TECHNIQUE: Contiguous axial images of the abdomen/pelvis were performed with IV contrast. 75 ml of Omnipaque 350 was utilized. Coronal and sagittal reformatted images were also obtained. All CT examinations are performed with 1 or more of the following dose reduction techniques: Automated exposure control, adjustment of mA and/or kv according to patient's size, or use  of iterative reconstruction techniques.     FINDINGS: The liver, gallbladder,  common bile duct, pancreas, spleen, and adrenal glands are unremarkable.   The kidneys enhance symmetrically. No urolithiasis is seen. No hydroureteronephrosis is seen.   The visualized aorta is unremarkable.   The small bowel is not dilated. The appendix is not visualized. There is acute diverticulitis at the mid-distal sigmoid colon. There is abscess formation along the serosal surface width fluid and gas formation. The abscess measures approximately 4.4 x 3.4 cm in maximum dimensions.   The bladder is normally distended with no gross wall thickening.   The visualized osseous structures are intact.   Limited images of the lower thorax are unremarkable.       acute diverticulitis at the mid-distal sigmoid colon. There is abscess formation along the serosal surface width fluid and gas formation. The abscess measures approximately 4.4 x 3.4 cm in maximum dimensions.   Signed by: Richard Gilman 11/28/2023 11:18 AM Dictation workstation:   [id number]           Assessment/Plan   Diverticulitis with abscess  Diarrhea-rule out infectious etiology    Continue IV zosyn  Monitor stools  IR was consulted for drainage-abscess too small to drain  Monitor temp and wbc  Supportive care  General surgery follow up    Fallon Gatica, APRN-CNP

## 2023-12-14 LAB
ALBUMIN SERPL-MCNC: 3.5 G/DL (ref 3.5–5)
ALP BLD-CCNC: 77 U/L (ref 35–125)
ALT SERPL-CCNC: 32 U/L (ref 5–40)
ANION GAP SERPL CALC-SCNC: 9 MMOL/L
AST SERPL-CCNC: 23 U/L (ref 5–40)
BASOPHILS # BLD AUTO: 0.02 X10*3/UL (ref 0–0.1)
BASOPHILS NFR BLD AUTO: 0.2 %
BILIRUB SERPL-MCNC: 0.3 MG/DL (ref 0.1–1.2)
BUN SERPL-MCNC: 5 MG/DL (ref 8–25)
CALCIUM SERPL-MCNC: 9 MG/DL (ref 8.5–10.4)
CHLORIDE SERPL-SCNC: 106 MMOL/L (ref 97–107)
CO2 SERPL-SCNC: 25 MMOL/L (ref 24–31)
CREAT SERPL-MCNC: 1.1 MG/DL (ref 0.4–1.6)
EOSINOPHIL # BLD AUTO: 0.18 X10*3/UL (ref 0–0.7)
EOSINOPHIL NFR BLD AUTO: 2.1 %
ERYTHROCYTE [DISTWIDTH] IN BLOOD BY AUTOMATED COUNT: 11.6 % (ref 11.5–14.5)
GFR SERPL CREATININE-BSD FRML MDRD: 79 ML/MIN/1.73M*2
GLUCOSE SERPL-MCNC: 97 MG/DL (ref 65–99)
HCT VFR BLD AUTO: 40.1 % (ref 41–52)
HGB BLD-MCNC: 13.6 G/DL (ref 13.5–17.5)
IMM GRANULOCYTES # BLD AUTO: 0.02 X10*3/UL (ref 0–0.7)
IMM GRANULOCYTES NFR BLD AUTO: 0.2 % (ref 0–0.9)
LYMPHOCYTES # BLD AUTO: 2.66 X10*3/UL (ref 1.2–4.8)
LYMPHOCYTES NFR BLD AUTO: 30.9 %
MCH RBC QN AUTO: 31.6 PG (ref 26–34)
MCHC RBC AUTO-ENTMCNC: 33.9 G/DL (ref 32–36)
MCV RBC AUTO: 93 FL (ref 80–100)
MONOCYTES # BLD AUTO: 0.66 X10*3/UL (ref 0.1–1)
MONOCYTES NFR BLD AUTO: 7.7 %
NEUTROPHILS # BLD AUTO: 5.08 X10*3/UL (ref 1.2–7.7)
NEUTROPHILS NFR BLD AUTO: 58.9 %
NRBC BLD-RTO: 0 /100 WBCS (ref 0–0)
PLATELET # BLD AUTO: 254 X10*3/UL (ref 150–450)
POTASSIUM SERPL-SCNC: 3.6 MMOL/L (ref 3.4–5.1)
PROT SERPL-MCNC: 7.1 G/DL (ref 5.9–7.9)
RBC # BLD AUTO: 4.3 X10*6/UL (ref 4.5–5.9)
SODIUM SERPL-SCNC: 140 MMOL/L (ref 133–145)
WBC # BLD AUTO: 8.6 X10*3/UL (ref 4.4–11.3)

## 2023-12-14 PROCEDURE — 2500000004 HC RX 250 GENERAL PHARMACY W/ HCPCS (ALT 636 FOR OP/ED): Performed by: NURSE PRACTITIONER

## 2023-12-14 PROCEDURE — 1100000001 HC PRIVATE ROOM DAILY

## 2023-12-14 PROCEDURE — 80053 COMPREHEN METABOLIC PANEL: CPT | Performed by: INTERNAL MEDICINE

## 2023-12-14 PROCEDURE — 36415 COLL VENOUS BLD VENIPUNCTURE: CPT | Performed by: INTERNAL MEDICINE

## 2023-12-14 PROCEDURE — 2500000004 HC RX 250 GENERAL PHARMACY W/ HCPCS (ALT 636 FOR OP/ED): Performed by: INTERNAL MEDICINE

## 2023-12-14 PROCEDURE — 85025 COMPLETE CBC W/AUTO DIFF WBC: CPT | Performed by: INTERNAL MEDICINE

## 2023-12-14 RX ADMIN — PIPERACILLIN SODIUM AND TAZOBACTAM SODIUM 3.38 G: 3; .375 INJECTION, SOLUTION INTRAVENOUS at 19:53

## 2023-12-14 RX ADMIN — PANTOPRAZOLE SODIUM 40 MG: 40 TABLET, DELAYED RELEASE ORAL at 04:40

## 2023-12-14 RX ADMIN — PIPERACILLIN SODIUM AND TAZOBACTAM SODIUM 3.38 G: 3; .375 INJECTION, SOLUTION INTRAVENOUS at 14:28

## 2023-12-14 RX ADMIN — PIPERACILLIN SODIUM AND TAZOBACTAM SODIUM 3.38 G: 3; .375 INJECTION, SOLUTION INTRAVENOUS at 04:40

## 2023-12-14 RX ADMIN — ACETAMINOPHEN 650 MG: 325 TABLET ORAL at 23:25

## 2023-12-14 RX ADMIN — PIPERACILLIN SODIUM AND TAZOBACTAM SODIUM 3.38 G: 3; .375 INJECTION, SOLUTION INTRAVENOUS at 09:17

## 2023-12-14 ASSESSMENT — COGNITIVE AND FUNCTIONAL STATUS - GENERAL
DAILY ACTIVITIY SCORE: 24
MOBILITY SCORE: 24
DAILY ACTIVITIY SCORE: 24
MOBILITY SCORE: 24

## 2023-12-14 ASSESSMENT — PAIN - FUNCTIONAL ASSESSMENT: PAIN_FUNCTIONAL_ASSESSMENT: 0-10

## 2023-12-14 ASSESSMENT — PAIN SCALES - GENERAL
PAINLEVEL_OUTOF10: 0 - NO PAIN
PAINLEVEL_OUTOF10: 4
PAINLEVEL_OUTOF10: 0 - NO PAIN

## 2023-12-14 NOTE — PROGRESS NOTES
Tom Treviño is a 53 y.o. male on day 3 of admission presenting with Diverticulitis.    Subjective   Interval History:   Afebrile, no chills  Tolerating full liquid diet  Passing gas, small bowel movement  Denies nausea, vomiting or diarrhea  Denies shortness of breath or cough        Objective   Range of Vitals (last 24 hours)  Heart Rate:  [60-71]   Temp:  [36.8 °C (98.2 °F)-37 °C (98.6 °F)]   Resp:  [18-20]   BP: (136-146)/(83-90)   SpO2:  [97 %-100 %]   Daily Weight  12/10/23 : 118 kg (259 lb 4.2 oz)    Body mass index is 33.29 kg/m².    Physical Exam  Constitutional:       Appearance: Normal appearance.   HENT:      Head: Normocephalic and atraumatic.      Nose: Nose normal.      Mouth/Throat:      Mouth: Mucous membranes are moist.      Pharynx: Oropharynx is clear.   Eyes:      Extraocular Movements: Extraocular movements intact.      Conjunctiva/sclera: Conjunctivae normal.   Cardiovascular:      Rate and Rhythm: Normal rate and regular rhythm.   Pulmonary:      Effort: Pulmonary effort is normal.      Breath sounds: Normal breath sounds.   Abdominal:      General: Bowel sounds are normal.      Palpations: Abdomen is soft.   Musculoskeletal:         General: Normal range of motion.      Cervical back: Normal range of motion and neck supple.   Skin:     General: Skin is warm and dry.   Neurological:      General: No focal deficit present.      Mental Status: He is alert and oriented to person, place, and time. Mental status is at baseline.   Psychiatric:         Mood and Affect: Mood normal.         Behavior: Behavior normal.     Antibiotics  iohexol (OMNIPaque) 350 mg iodine/mL solution 75 mL  dicyclomine (Bentyl) tablet 20 mg  sodium chloride 0.9 % bolus 500 mL  ondansetron (Zofran) injection 4 mg  ketorolac (Toradol) injection 15 mg  famotidine PF (Pepcid) injection 20 mg  piperacillin-tazobactam-dextrose (Zosyn) IV 3.375 g  hydroCHLOROthiazide (HYDRODiuril) tablet 25 mg  lansoprazole (Prevacid)   "capsule 30 mg  ciprofloxacin (Cipro) IVPB 400 mg  metroNIDAZOLE in NaCl (iso-os) (Flagyl)  mg  enoxaparin (Lovenox) syringe 40 mg  sodium chloride 0.9 % with KCl 20 mEq/L infusion  acetaminophen (Tylenol) tablet 650 mg  acetaminophen (Tylenol) oral liquid 650 mg  acetaminophen (Tylenol) suppository 650 mg  acetaminophen (Tylenol) tablet 650 mg  acetaminophen (Tylenol) oral liquid 650 mg  acetaminophen (Tylenol) suppository 650 mg  ibuprofen tablet 400 mg  polyethylene glycol (Glycolax, Miralax) packet 17 g  ondansetron ODT (Zofran-ODT) disintegrating tablet 4 mg  ondansetron (Zofran) injection 4 mg  morphine injection 4 mg  vancomycin-diluent combo no.1 (Xellia) IVPB 2,000 mg  pantoprazole (ProtoNix) EC tablet 40 mg  piperacillin-tazobactam-dextrose (Zosyn) IV 3.375 g      Relevant Results  Labs  Results from last 72 hours   Lab Units 12/14/23 0435 12/13/23 0421 12/12/23  0510   WBC AUTO x10*3/uL 8.6 6.5 8.3   HEMOGLOBIN g/dL 13.6 13.2* 13.4*   HEMATOCRIT % 40.1* 38.9* 39.3*   PLATELETS AUTO x10*3/uL 254 236 231   NEUTROS PCT AUTO % 58.9 47.8 57.8   LYMPHS PCT AUTO % 30.9 40.0 30.7   MONOS PCT AUTO % 7.7 8.6 9.4   EOS PCT AUTO % 2.1 2.8 1.3       Results from last 72 hours   Lab Units 12/14/23 0435 12/13/23 0421 12/12/23  0510   SODIUM mmol/L 140 140 141   POTASSIUM mmol/L 3.6 3.5 3.4   CHLORIDE mmol/L 106 107 104   CO2 mmol/L 25 24 27   BUN mg/dL 5* 6* 9   CREATININE mg/dL 1.10 1.10 1.30   GLUCOSE mg/dL 97 95 101*   CALCIUM mg/dL 9.0 8.6 9.1   ANION GAP mmol/L 9 9 10   EGFR mL/min/1.73m*2 79 79 65       Results from last 72 hours   Lab Units 12/14/23 0435 12/13/23 0421 12/12/23  0510   ALK PHOS U/L 77 78 84   BILIRUBIN TOTAL mg/dL 0.3 0.5 1.0   PROTEIN TOTAL g/dL 7.1 6.9 6.9   ALT U/L 32 25 27   AST U/L 23 16 14   ALBUMIN g/dL 3.5 3.3* 3.4*       Estimated Creatinine Clearance: 103.6 mL/min (by C-G formula based on SCr of 1.1 mg/dL).  No results found for: \"CRP\"  Microbiology    Stool pathogen PCR " negative     Imaging  CT abdomen pelvis w IV contrast    Result Date: 12/10/2023  Interpreted By:  Richard Gilman, STUDY: CT ABDOMEN PELVIS W IV CONTRAST; 12/10/2023 10:14 pm   INDICATION: Abscess left side of abdomen, continued abdominal pain   COMPARISON: 11/28/2023   ACCESSION NUMBER(S): [id number]   ORDERING CLINICIAN: AJSON OLIVAS   TECHNIQUE: Contiguous axial images of the abdomen/pelvis were performed with IV contrast. 75 ml of Omnipaque 350 was utilized. Coronal and sagittal reformatted images were also obtained. All CT examinations are performed with 1 or more of the following dose reduction techniques: Automated exposure control, adjustment of mA and/or kv according to patient's size, or use of iterative reconstruction techniques.     FINDINGS: The liver, gallbladder,  common bile duct, pancreas, spleen, and adrenal glands are unremarkable.   The kidneys enhance symmetrically. No urolithiasis is seen. No hydroureteronephrosis is seen.   The visualized aorta is unremarkable.   The small bowel is not dilated. Again seen is a pericolonic abscess in the mid sigmoid colon with diffuse circumferential wall thickening and serosal phlegmonous changes. There is a serosal based collection measuring 2.7 x 2.6 cm as well as smaller collections along the serosal surface. Overall the degree of phlegmonous changes stable to minimally worsened since the prior study.   The small abscess abuts the dome of the bladder without evidence of a fistulous tract.   No free intraperitoneal air or fluid is seen.   The bladder is well distended with no gross wall thickening.   The visualized osseous structures are intact.   Limited images of the lower thorax are unremarkable.       Again seen is a pericolonic abscess in the mid sigmoid colon with diffuse circumferential wall thickening and serosal phlegmonous changes. There is a serosal based collection measuring 2.7 x 2.6 cm as well as smaller collections along the serosal  surface. Overall the degree of phlegmonous changes stable to minimally worsened since the prior study.   Signed by: Richard Gilman 12/10/2023 11:07 PM Dictation workstation:   [id number]    CT guided percutaneous peritoneal or retroperitoneal fluid collection drainage    Result Date: 11/30/2023  Interpreted By:  Mich Reveles, STUDY: CT GUIDED PERCUTANEOUS PERITONEAL OR RETROPERITONEAL FLUID COLLECTION DRAINAGE; 11/28/2023 4:20 pm   INDICATION: Signs/Symptoms:diverticular abcess.   COMPARISON: 6 hours earlier same day   ACCESSION NUMBER(S): [id number]   ORDERING CLINICIAN: MIHC REVELES   TECHNIQUE: Axial CT images of the pelvis in preparation for abscess drainage   FINDINGS: Informed consent obtained. Patient positioned supine. Axial CT images of the pelvis obtained in preparation for drainage of previously identified small diverticular abscess. The exam reveals interval reduction in volume of abscess adjacent proximal-mid sigmoid colon, now 1.9 cm. Given improvement in a very short time on antibiotics the drainage was deferred.       Reduction in size of small diverticular abscess sigmoid colon, plain drainage was deferred.   Signed by: Mich Reveles 11/30/2023 2:13 PM Dictation workstation:   [id number]    CT abdomen pelvis w IV contrast    Result Date: 11/28/2023  Interpreted By:  Richard Gilman, STUDY: CT ABDOMEN PELVIS W IV CONTRAST; 11/28/2023 10:22 am   INDICATION: Signs/Symptoms:hx of diverticulitis, left lower quadrant pain;   COMPARISON: 07/10/2020   ACCESSION NUMBER(S): [id number]   ORDERING CLINICIAN: KAVON RIZZO   TECHNIQUE: Contiguous axial images of the abdomen/pelvis were performed with IV contrast. 75 ml of Omnipaque 350 was utilized. Coronal and sagittal reformatted images were also obtained. All CT examinations are performed with 1 or more of the following dose reduction techniques: Automated exposure control, adjustment of mA and/or kv according to patient's size, or use  of iterative reconstruction techniques.     FINDINGS: The liver, gallbladder,  common bile duct, pancreas, spleen, and adrenal glands are unremarkable.   The kidneys enhance symmetrically. No urolithiasis is seen. No hydroureteronephrosis is seen.   The visualized aorta is unremarkable.   The small bowel is not dilated. The appendix is not visualized. There is acute diverticulitis at the mid-distal sigmoid colon. There is abscess formation along the serosal surface width fluid and gas formation. The abscess measures approximately 4.4 x 3.4 cm in maximum dimensions.   The bladder is normally distended with no gross wall thickening.   The visualized osseous structures are intact.   Limited images of the lower thorax are unremarkable.       acute diverticulitis at the mid-distal sigmoid colon. There is abscess formation along the serosal surface width fluid and gas formation. The abscess measures approximately 4.4 x 3.4 cm in maximum dimensions.   Signed by: Richard Gilman 11/28/2023 11:18 AM Dictation workstation:   [id number]           Assessment/Plan   Diverticulitis with abscess  Diarrhea-rule out infectious etiology    Continue IV zosyn  Monitor stools  IR was consulted for drainage-abscess too small to drain  Monitor temp and wbc  Supportive care  General surgery follow up        Doris Burns, APRN-CNP

## 2023-12-14 NOTE — PROGRESS NOTES
Tom Treviño is a 53 y.o. male on day 3 of admission presenting with Diverticulitis.    Subjective   Feels pretty good and better than yesterday.  Taking his full liquid diet well without any problems.  No abdominal pain, nausea, vomiting, chills since seen yesterday.  Passing gas and had 3 small soft bowel movements since seen yesterday without any blood.  Denies any symptoms from his umbilical hernia.       Objective     Vital signs in last 24 hours:  Temp:  [36.8 °C (98.2 °F)-37 °C (98.6 °F)] 36.8 °C (98.2 °F)  Heart Rate:  [60-71] 64  Resp:  [18-20] 18  BP: (136-146)/(83-90) 141/84  Heart Rate:  [60-71]   Temp:  [36.8 °C (98.2 °F)-37 °C (98.6 °F)]   Resp:  [18-20]   BP: (136-146)/(83-90)   SpO2:  [97 %-100 %]      Intake/Output last 3 Shifts:  I/O last 3 completed shifts:  In: 960 (8.2 mL/kg) [P.O.:960]  Out: 200 (1.7 mL/kg) [Urine:200 (0 mL/kg/hr)]  Weight: 117.6 kg     Physical Exam  In chair  No acute distress  Not septic appearing  Looks fine and comfortable  Alert and oriented  Heart regular  Lungs clear  No edema  Abdomen soft, positive bowel sounds, nondistended, nontender.  Small benign umbilical hernia    Scheduled medications  enoxaparin, 40 mg, subcutaneous, q24h  pantoprazole, 40 mg, oral, Daily before breakfast  piperacillin-tazobactam, 3.375 g, intravenous, q6h  polyethylene glycol, 17 g, oral, Daily      Continuous medications     PRN medications  PRN medications: acetaminophen **OR** acetaminophen **OR** acetaminophen, ibuprofen, morphine, ondansetron ODT **OR** ondansetron    Relevant Results  Results from last 7 days   Lab Units 12/14/23  0435 12/13/23  0421 12/12/23  0510   WBC AUTO x10*3/uL 8.6 6.5 8.3   HEMOGLOBIN g/dL 13.6 13.2* 13.4*   HEMATOCRIT % 40.1* 38.9* 39.3*   PLATELETS AUTO x10*3/uL 254 236 231      Results from last 7 days   Lab Units 12/14/23  0435 12/13/23  0421 12/12/23  0510   SODIUM mmol/L 140 140 141   POTASSIUM mmol/L 3.6 3.5 3.4   CHLORIDE mmol/L 106 107 104   CO2  mmol/L 25 24 27   BUN mg/dL 5* 6* 9   CREATININE mg/dL 1.10 1.10 1.30   GLUCOSE mg/dL 97 95 101*   CALCIUM mg/dL 9.0 8.6 9.1      CT abdomen pelvis w IV contrast  Result Date: 12/10/2023  Interpreted By:  Richard Gilman, STUDY: CT ABDOMEN PELVIS W IV CONTRAST; 12/10/2023 10:14 pm   INDICATION: Abscess left side of abdomen, continued abdominal pain   COMPARISON: 11/28/2023   ACCESSION NUMBER(S): [id number]   ORDERING CLINICIAN: JASON OLIVAS   TECHNIQUE: Contiguous axial images of the abdomen/pelvis were performed with IV contrast. 75 ml of Omnipaque 350 was utilized. Coronal and sagittal reformatted images were also obtained. All CT examinations are performed with 1 or more of the following dose reduction techniques: Automated exposure control, adjustment of mA and/or kv according to patient's size, or use of iterative reconstruction techniques.     FINDINGS: The liver, gallbladder,  common bile duct, pancreas, spleen, and adrenal glands are unremarkable.   The kidneys enhance symmetrically. No urolithiasis is seen. No hydroureteronephrosis is seen.   The visualized aorta is unremarkable.   The small bowel is not dilated. Again seen is a pericolonic abscess in the mid sigmoid colon with diffuse circumferential wall thickening and serosal phlegmonous changes. There is a serosal based collection measuring 2.7 x 2.6 cm as well as smaller collections along the serosal surface. Overall the degree of phlegmonous changes stable to minimally worsened since the prior study.   The small abscess abuts the dome of the bladder without evidence of a fistulous tract.   No free intraperitoneal air or fluid is seen.   The bladder is well distended with no gross wall thickening.   The visualized osseous structures are intact.   Limited images of the lower thorax are unremarkable.        Again seen is a pericolonic abscess in the mid sigmoid colon with diffuse circumferential wall thickening and serosal phlegmonous changes. There  is a serosal based collection measuring 2.7 x 2.6 cm as well as smaller collections along the serosal surface. Overall the degree of phlegmonous changes stable to minimally worsened since the prior study.   Signed by: Richard Gilman 12/10/2023 11:07 PM Dictation workstation:   [id number]     Assessment/Plan   Diverticulitis of intestine with abscesses  On full liquids-keep on full liquids for today-can be advanced to soft low fiber diet on Friday if continues to do well  Antibiotics per ID-on Zosyn  IR consulted for drainage 12/11 am-as of 12/12 am there were still no notes from IR. Dr Delgado spoke with IR 12/12-fluid collection not large enough to drain. Therefore we will continue with IV antibiotics. May repeat CT scan depending on clinical status.  Morning labs ordered  Previously discussed with patient if he gets better without surgery then he needs to have a colonoscopy in 6 to 8 weeks down the road to make sure that this is only diverticulitis and not a perforated colon cancer  Recommend to discontinue MiraLAX-will defer to IM who ordered it  Morning labs ordered  No acute surgical indications at present  Will follow     Upon discharge:   1-soft low fiber low residue diet for 2 weeks   2-avoid constipation:   -drink 8 glasses of water per day   -after 2 weeks take Metamucil daily   3-follow-up with your gastroenterologist to have an elective colonoscopy in 6 to 8 weeks to make sure that this is only diverticulitis and not a perforated colon cancer   4-follow-up with a colorectal surgeon (Dr Ayse Persaud or Dr Paras Nunez) after has colonoscopy to have an elective colon resection   5-no nuts or seeds      Umbilical hernia  Benign  No acute surgical indications for at present     DVT prophylaxis  Per IM-on Lovenox    ROWDY Fiore-CNP

## 2023-12-14 NOTE — PROGRESS NOTES
Tom Treviño is a 53 y.o. male on day 3 of admission presenting with Diverticulitis.      Subjective   He is tolerating full liquid diet no significant symptoms  Per his account Dr. Young wants to keep him on full liquid diet for now       Objective     Last Recorded Vitals  /84 (BP Location: Left arm, Patient Position: Sitting)   Pulse 64   Temp 36.8 °C (98.2 °F) (Oral)   Resp 18   Wt 118 kg (259 lb 4.2 oz)   SpO2 100%   Intake/Output last 3 Shifts:    Intake/Output Summary (Last 24 hours) at 12/14/2023 0842  Last data filed at 12/13/2023 2335  Gross per 24 hour   Intake 960 ml   Output 200 ml   Net 760 ml       Physical Exam  Walking around the room no distress  Chest clear  Heart regular  Abdomen soft nontender  Extremities no edema  Neurologic exam gross nonfocal  Relevant Results  Reviewed lab work  Assessment/Plan     Principal Problem:    Diverticulitis      Antibiotic IV for diverticulitis with abscess gradually reintroducing diet continues to improve             Ben Higgins MD

## 2023-12-14 NOTE — PROGRESS NOTES
"Tom Treviño is a 53 y.o. male on day 3 of admission presenting with Diverticulitis.    Subjective   Pt is anticipated to increase his diet today. If pt tolerates an advanced diet, pt will likely discharge home tomorrow with no needs.        Objective     Physical Exam    Last Recorded Vitals  Blood pressure 141/84, pulse 64, temperature 36.8 °C (98.2 °F), temperature source Oral, resp. rate 18, height 1.88 m (6' 2\"), weight 118 kg (259 lb 4.2 oz), SpO2 100 %.  Intake/Output last 3 Shifts:  I/O last 3 completed shifts:  In: 960 (8.2 mL/kg) [P.O.:960]  Out: 200 (1.7 mL/kg) [Urine:200 (0 mL/kg/hr)]  Weight: 117.6 kg     Relevant Results                             Assessment/Plan   Principal Problem:    Diverticulitis               SADIA Brasher      "

## 2023-12-15 LAB
ALBUMIN SERPL-MCNC: 3.3 G/DL (ref 3.5–5)
ALP BLD-CCNC: 75 U/L (ref 35–125)
ALT SERPL-CCNC: 36 U/L (ref 5–40)
ANION GAP SERPL CALC-SCNC: 9 MMOL/L
AST SERPL-CCNC: 24 U/L (ref 5–40)
BASOPHILS # BLD AUTO: 0.03 X10*3/UL (ref 0–0.1)
BASOPHILS NFR BLD AUTO: 0.3 %
BILIRUB SERPL-MCNC: 0.4 MG/DL (ref 0.1–1.2)
BUN SERPL-MCNC: 4 MG/DL (ref 8–25)
CALCIUM SERPL-MCNC: 8.8 MG/DL (ref 8.5–10.4)
CHLORIDE SERPL-SCNC: 107 MMOL/L (ref 97–107)
CO2 SERPL-SCNC: 24 MMOL/L (ref 24–31)
CREAT SERPL-MCNC: 1.2 MG/DL (ref 0.4–1.6)
EOSINOPHIL # BLD AUTO: 0.17 X10*3/UL (ref 0–0.7)
EOSINOPHIL NFR BLD AUTO: 1.9 %
ERYTHROCYTE [DISTWIDTH] IN BLOOD BY AUTOMATED COUNT: 11.6 % (ref 11.5–14.5)
GFR SERPL CREATININE-BSD FRML MDRD: 71 ML/MIN/1.73M*2
GLUCOSE SERPL-MCNC: 96 MG/DL (ref 65–99)
HCT VFR BLD AUTO: 42.4 % (ref 41–52)
HGB BLD-MCNC: 14.1 G/DL (ref 13.5–17.5)
IMM GRANULOCYTES # BLD AUTO: 0.02 X10*3/UL (ref 0–0.7)
IMM GRANULOCYTES NFR BLD AUTO: 0.2 % (ref 0–0.9)
LYMPHOCYTES # BLD AUTO: 2.78 X10*3/UL (ref 1.2–4.8)
LYMPHOCYTES NFR BLD AUTO: 31.7 %
MCH RBC QN AUTO: 31.3 PG (ref 26–34)
MCHC RBC AUTO-ENTMCNC: 33.3 G/DL (ref 32–36)
MCV RBC AUTO: 94 FL (ref 80–100)
MONOCYTES # BLD AUTO: 0.67 X10*3/UL (ref 0.1–1)
MONOCYTES NFR BLD AUTO: 7.6 %
NEUTROPHILS # BLD AUTO: 5.09 X10*3/UL (ref 1.2–7.7)
NEUTROPHILS NFR BLD AUTO: 58.3 %
NRBC BLD-RTO: 0 /100 WBCS (ref 0–0)
PLATELET # BLD AUTO: 258 X10*3/UL (ref 150–450)
POTASSIUM SERPL-SCNC: 3.8 MMOL/L (ref 3.4–5.1)
PROT SERPL-MCNC: 6.9 G/DL (ref 5.9–7.9)
RBC # BLD AUTO: 4.51 X10*6/UL (ref 4.5–5.9)
SODIUM SERPL-SCNC: 140 MMOL/L (ref 133–145)
WBC # BLD AUTO: 8.8 X10*3/UL (ref 4.4–11.3)

## 2023-12-15 PROCEDURE — 2500000004 HC RX 250 GENERAL PHARMACY W/ HCPCS (ALT 636 FOR OP/ED): Performed by: INTERNAL MEDICINE

## 2023-12-15 PROCEDURE — 84075 ASSAY ALKALINE PHOSPHATASE: CPT | Performed by: INTERNAL MEDICINE

## 2023-12-15 PROCEDURE — 36415 COLL VENOUS BLD VENIPUNCTURE: CPT | Performed by: INTERNAL MEDICINE

## 2023-12-15 PROCEDURE — 1100000001 HC PRIVATE ROOM DAILY

## 2023-12-15 PROCEDURE — 85025 COMPLETE CBC W/AUTO DIFF WBC: CPT | Performed by: INTERNAL MEDICINE

## 2023-12-15 PROCEDURE — 2500000004 HC RX 250 GENERAL PHARMACY W/ HCPCS (ALT 636 FOR OP/ED): Performed by: NURSE PRACTITIONER

## 2023-12-15 RX ADMIN — PIPERACILLIN SODIUM AND TAZOBACTAM SODIUM 3.38 G: 3; .375 INJECTION, SOLUTION INTRAVENOUS at 09:56

## 2023-12-15 RX ADMIN — PIPERACILLIN SODIUM AND TAZOBACTAM SODIUM 3.38 G: 3; .375 INJECTION, SOLUTION INTRAVENOUS at 01:59

## 2023-12-15 RX ADMIN — PIPERACILLIN SODIUM AND TAZOBACTAM SODIUM 3.38 G: 3; .375 INJECTION, SOLUTION INTRAVENOUS at 20:08

## 2023-12-15 RX ADMIN — PANTOPRAZOLE SODIUM 40 MG: 40 TABLET, DELAYED RELEASE ORAL at 05:21

## 2023-12-15 RX ADMIN — PIPERACILLIN SODIUM AND TAZOBACTAM SODIUM 3.38 G: 3; .375 INJECTION, SOLUTION INTRAVENOUS at 14:00

## 2023-12-15 ASSESSMENT — COGNITIVE AND FUNCTIONAL STATUS - GENERAL
DAILY ACTIVITIY SCORE: 24
DAILY ACTIVITIY SCORE: 24
MOBILITY SCORE: 24
MOBILITY SCORE: 24

## 2023-12-15 ASSESSMENT — PAIN SCALES - GENERAL
PAINLEVEL_OUTOF10: 0 - NO PAIN
PAINLEVEL_OUTOF10: 0 - NO PAIN

## 2023-12-15 NOTE — PROGRESS NOTES
"Tom Treviño is a 53 y.o. male on day 4 of admission presenting with Diverticulitis.    Subjective   Feels pretty good and the same as yesterday.  Took his full liquid diet yesterday and today good without any nausea, vomiting, or abdominal pain except for only 1 episode of abdominal pain since seen yesterday around 2000 that he says was just a little bit of only left lower quadrant last night that was \"crampy attack thing\" \"sharp pain\" 3 and he has not had before or since then.  Says was increased to soft diet this morning by IM and that IM plans on possible discharge tomorrow.  Had the soft diet this morning with no issues so far. Passing gas.  Had 2 small solid bowel movement since seen yesterday without blood.  No chills. No symptoms from his umbilical hernia.       Objective     Vital signs in last 24 hours:  Temp:  [36.5 °C (97.7 °F)-36.7 °C (98.1 °F)] 36.5 °C (97.7 °F)  Heart Rate:  [57-67] 57  Resp:  [16-18] 17  BP: (132-143)/(80-94) 138/80  Heart Rate:  [57-67]   Temp:  [36.5 °C (97.7 °F)-36.7 °C (98.1 °F)]   Resp:  [16-18]   BP: (132-143)/(80-94)   SpO2:  [98 %-99 %]      Intake/Output last 3 Shifts:  I/O last 3 completed shifts:  In: 1020 (8.7 mL/kg) [P.O.:1020]  Out: 200 (1.7 mL/kg) [Urine:200 (0 mL/kg/hr)]  Weight: 117.6 kg     Physical Exam  No acute distress  Not septic appearing  Looks fine and comfortable  Alert and oriented  Heart regular  Lungs clear  No edema  Abdomen soft, positive bowel sounds, nondistended, nontender.  Small benign umbilical hernia    Scheduled medications  enoxaparin, 40 mg, subcutaneous, q24h  pantoprazole, 40 mg, oral, Daily before breakfast  piperacillin-tazobactam, 3.375 g, intravenous, q6h      Continuous medications     PRN medications  PRN medications: acetaminophen **OR** acetaminophen **OR** acetaminophen, ibuprofen, ondansetron ODT **OR** ondansetron    Relevant Results  Results from last 7 days   Lab Units 12/15/23  0507 12/14/23  0435 12/13/23  0421   WBC AUTO " x10*3/uL 8.8 8.6 6.5   HEMOGLOBIN g/dL 14.1 13.6 13.2*   HEMATOCRIT % 42.4 40.1* 38.9*   PLATELETS AUTO x10*3/uL 258 254 236      Results from last 7 days   Lab Units 12/15/23  0508 12/14/23  0435 12/13/23  0421   SODIUM mmol/L 140 140 140   POTASSIUM mmol/L 3.8 3.6 3.5   CHLORIDE mmol/L 107 106 107   CO2 mmol/L 24 25 24   BUN mg/dL 4* 5* 6*   CREATININE mg/dL 1.20 1.10 1.10   GLUCOSE mg/dL 96 97 95   CALCIUM mg/dL 8.8 9.0 8.6      CT abdomen pelvis w IV contrast  Result Date: 12/10/2023  Interpreted By:  Richard Gilman, STUDY: CT ABDOMEN PELVIS W IV CONTRAST; 12/10/2023 10:14 pm   INDICATION: Abscess left side of abdomen, continued abdominal pain   COMPARISON: 11/28/2023   ACCESSION NUMBER(S): [id number]   ORDERING CLINICIAN: JASON OLIVAS   TECHNIQUE: Contiguous axial images of the abdomen/pelvis were performed with IV contrast. 75 ml of Omnipaque 350 was utilized. Coronal and sagittal reformatted images were also obtained. All CT examinations are performed with 1 or more of the following dose reduction techniques: Automated exposure control, adjustment of mA and/or kv according to patient's size, or use of iterative reconstruction techniques.     FINDINGS: The liver, gallbladder,  common bile duct, pancreas, spleen, and adrenal glands are unremarkable.   The kidneys enhance symmetrically. No urolithiasis is seen. No hydroureteronephrosis is seen.   The visualized aorta is unremarkable.   The small bowel is not dilated. Again seen is a pericolonic abscess in the mid sigmoid colon with diffuse circumferential wall thickening and serosal phlegmonous changes. There is a serosal based collection measuring 2.7 x 2.6 cm as well as smaller collections along the serosal surface. Overall the degree of phlegmonous changes stable to minimally worsened since the prior study.   The small abscess abuts the dome of the bladder without evidence of a fistulous tract.   No free intraperitoneal air or fluid is seen.   The  bladder is well distended with no gross wall thickening.   The visualized osseous structures are intact.   Limited images of the lower thorax are unremarkable.        Again seen is a pericolonic abscess in the mid sigmoid colon with diffuse circumferential wall thickening and serosal phlegmonous changes. There is a serosal based collection measuring 2.7 x 2.6 cm as well as smaller collections along the serosal surface. Overall the degree of phlegmonous changes stable to minimally worsened since the prior study.   Signed by: Richard Gilman 12/10/2023 11:07 PM Dictation workstation:   [id number]     Assessment/Plan   Diverticulitis of intestine with abscesses  Advanced to soft low fiber diet today  Antibiotics per ID-on Zosyn  IR consulted for drainage 12/11 am-as of 12/12 am there were still no notes from IR. Dr Delgado spoke with IR 12/12-fluid collection not large enough to drain. Therefore we will continue with IV antibiotics. May repeat CT scan depending on clinical status.  Morning labs ordered  Previously discussed with patient if he gets better without surgery then he needs to have a colonoscopy in 6 to 8 weeks down the road to make sure that this is only diverticulitis and not a perforated colon cancer  Morning labs ordered  No acute surgical indications at present  Will follow     Upon discharge:   1-soft low fiber low residue diet for 2 weeks   2-avoid constipation:   -drink 8 glasses of water per day   -after 2 weeks take Metamucil daily   3-follow-up with your gastroenterologist to have an elective colonoscopy in 6 to 8 weeks to make sure that this is only diverticulitis and not a perforated colon cancer   4-follow-up with a colorectal surgeon (Dr Ayse Persaud or Dr Paras Nunez) after has colonoscopy to have an elective colon resection   5-no nuts or seeds      Umbilical hernia  Benign  No acute surgical indications for at present     DVT prophylaxis  Per IM-on Lovenox    Mai LUEVANO Wells,  APRN-CNP

## 2023-12-15 NOTE — PROGRESS NOTES
Tom Treviño is a 53 y.o. male on day 4 of admission presenting with Diverticulitis.     Advance diet to soft and will continue to monitor. Plan is to discharge home once stable with no needs.    Antionette Campbell RN

## 2023-12-15 NOTE — NURSING NOTE
Assumed care of patient, patient noted ambulating in room with bedside shift report, no c/o voiced, vs stable.

## 2023-12-15 NOTE — PROGRESS NOTES
Tom Treviño is a 53 y.o. male on day 4 of admission presenting with Diverticulitis.    Subjective   Interval History:   Afebrile, no chills  Reported one episode left lower abdominal pain overnight  Tolerating soft diet today  Passing gas, small bowel movement yesterday  Denies nausea, vomiting or diarrhea  Denies shortness of breath or cough        Objective   Range of Vitals (last 24 hours)  Heart Rate:  [57-67]   Temp:  [36.5 °C (97.7 °F)-36.7 °C (98.1 °F)]   Resp:  [16-18]   BP: (132-143)/(80-94)   SpO2:  [98 %-99 %]   Daily Weight  12/10/23 : 118 kg (259 lb 4.2 oz)    Body mass index is 33.29 kg/m².    Physical Exam  Constitutional:       Appearance: Normal appearance.   HENT:      Head: Normocephalic and atraumatic.      Nose: Nose normal.      Mouth/Throat:      Mouth: Mucous membranes are moist.      Pharynx: Oropharynx is clear.   Eyes:      Extraocular Movements: Extraocular movements intact.      Conjunctiva/sclera: Conjunctivae normal.   Cardiovascular:      Rate and Rhythm: Normal rate and regular rhythm.   Pulmonary:      Effort: Pulmonary effort is normal.      Breath sounds: Normal breath sounds.   Abdominal:      General: Bowel sounds are normal.      Palpations: Abdomen is soft.   Musculoskeletal:         General: Normal range of motion.      Cervical back: Normal range of motion and neck supple.   Skin:     General: Skin is warm and dry.   Neurological:      General: No focal deficit present.      Mental Status: He is alert and oriented to person, place, and time. Mental status is at baseline.   Psychiatric:         Mood and Affect: Mood normal.         Behavior: Behavior normal.     Antibiotics  iohexol (OMNIPaque) 350 mg iodine/mL solution 75 mL  dicyclomine (Bentyl) tablet 20 mg  sodium chloride 0.9 % bolus 500 mL  ondansetron (Zofran) injection 4 mg  ketorolac (Toradol) injection 15 mg  famotidine PF (Pepcid) injection 20 mg  piperacillin-tazobactam-dextrose (Zosyn) IV 3.375  g  hydroCHLOROthiazide (HYDRODiuril) tablet 25 mg  lansoprazole (Prevacid) DR capsule 30 mg  ciprofloxacin (Cipro) IVPB 400 mg  metroNIDAZOLE in NaCl (iso-os) (Flagyl)  mg  enoxaparin (Lovenox) syringe 40 mg  sodium chloride 0.9 % with KCl 20 mEq/L infusion  acetaminophen (Tylenol) tablet 650 mg  acetaminophen (Tylenol) oral liquid 650 mg  acetaminophen (Tylenol) suppository 650 mg  acetaminophen (Tylenol) tablet 650 mg  acetaminophen (Tylenol) oral liquid 650 mg  acetaminophen (Tylenol) suppository 650 mg  ibuprofen tablet 400 mg  polyethylene glycol (Glycolax, Miralax) packet 17 g  ondansetron ODT (Zofran-ODT) disintegrating tablet 4 mg  ondansetron (Zofran) injection 4 mg  morphine injection 4 mg  vancomycin-diluent combo no.1 (Xellia) IVPB 2,000 mg  pantoprazole (ProtoNix) EC tablet 40 mg  piperacillin-tazobactam-dextrose (Zosyn) IV 3.375 g      Relevant Results  Labs  Results from last 72 hours   Lab Units 12/15/23  0507 12/14/23 0435 12/13/23 0421   WBC AUTO x10*3/uL 8.8 8.6 6.5   HEMOGLOBIN g/dL 14.1 13.6 13.2*   HEMATOCRIT % 42.4 40.1* 38.9*   PLATELETS AUTO x10*3/uL 258 254 236   NEUTROS PCT AUTO % 58.3 58.9 47.8   LYMPHS PCT AUTO % 31.7 30.9 40.0   MONOS PCT AUTO % 7.6 7.7 8.6   EOS PCT AUTO % 1.9 2.1 2.8       Results from last 72 hours   Lab Units 12/15/23  0508 12/14/23 0435 12/13/23  0421   SODIUM mmol/L 140 140 140   POTASSIUM mmol/L 3.8 3.6 3.5   CHLORIDE mmol/L 107 106 107   CO2 mmol/L 24 25 24   BUN mg/dL 4* 5* 6*   CREATININE mg/dL 1.20 1.10 1.10   GLUCOSE mg/dL 96 97 95   CALCIUM mg/dL 8.8 9.0 8.6   ANION GAP mmol/L 9 9 9   EGFR mL/min/1.73m*2 71 79 79       Results from last 72 hours   Lab Units 12/15/23  0508 12/14/23  0435 12/13/23  0421   ALK PHOS U/L 75 77 78   BILIRUBIN TOTAL mg/dL 0.4 0.3 0.5   PROTEIN TOTAL g/dL 6.9 7.1 6.9   ALT U/L 36 32 25   AST U/L 24 23 16   ALBUMIN g/dL 3.3* 3.5 3.3*       Estimated Creatinine Clearance: 94.9 mL/min (by C-G formula based on SCr of 1.2  "mg/dL).  No results found for: \"CRP\"  Microbiology    Stool pathogen PCR negative     Imaging  CT abdomen pelvis w IV contrast    Result Date: 12/10/2023  Interpreted By:  Richard Gilman, STUDY: CT ABDOMEN PELVIS W IV CONTRAST; 12/10/2023 10:14 pm   INDICATION: Abscess left side of abdomen, continued abdominal pain   COMPARISON: 11/28/2023   ACCESSION NUMBER(S): [id number]   ORDERING CLINICIAN: JASON OLIVAS   TECHNIQUE: Contiguous axial images of the abdomen/pelvis were performed with IV contrast. 75 ml of Omnipaque 350 was utilized. Coronal and sagittal reformatted images were also obtained. All CT examinations are performed with 1 or more of the following dose reduction techniques: Automated exposure control, adjustment of mA and/or kv according to patient's size, or use of iterative reconstruction techniques.     FINDINGS: The liver, gallbladder,  common bile duct, pancreas, spleen, and adrenal glands are unremarkable.   The kidneys enhance symmetrically. No urolithiasis is seen. No hydroureteronephrosis is seen.   The visualized aorta is unremarkable.   The small bowel is not dilated. Again seen is a pericolonic abscess in the mid sigmoid colon with diffuse circumferential wall thickening and serosal phlegmonous changes. There is a serosal based collection measuring 2.7 x 2.6 cm as well as smaller collections along the serosal surface. Overall the degree of phlegmonous changes stable to minimally worsened since the prior study.   The small abscess abuts the dome of the bladder without evidence of a fistulous tract.   No free intraperitoneal air or fluid is seen.   The bladder is well distended with no gross wall thickening.   The visualized osseous structures are intact.   Limited images of the lower thorax are unremarkable.       Again seen is a pericolonic abscess in the mid sigmoid colon with diffuse circumferential wall thickening and serosal phlegmonous changes. There is a serosal based collection " measuring 2.7 x 2.6 cm as well as smaller collections along the serosal surface. Overall the degree of phlegmonous changes stable to minimally worsened since the prior study.   Signed by: Richard Gilman 12/10/2023 11:07 PM Dictation workstation:   [id number]    CT guided percutaneous peritoneal or retroperitoneal fluid collection drainage    Result Date: 11/30/2023  Interpreted By:  Mich Reveles, STUDY: CT GUIDED PERCUTANEOUS PERITONEAL OR RETROPERITONEAL FLUID COLLECTION DRAINAGE; 11/28/2023 4:20 pm   INDICATION: Signs/Symptoms:diverticular abcess.   COMPARISON: 6 hours earlier same day   ACCESSION NUMBER(S): [id number]   ORDERING CLINICIAN: MICH REVELES   TECHNIQUE: Axial CT images of the pelvis in preparation for abscess drainage   FINDINGS: Informed consent obtained. Patient positioned supine. Axial CT images of the pelvis obtained in preparation for drainage of previously identified small diverticular abscess. The exam reveals interval reduction in volume of abscess adjacent proximal-mid sigmoid colon, now 1.9 cm. Given improvement in a very short time on antibiotics the drainage was deferred.       Reduction in size of small diverticular abscess sigmoid colon, plain drainage was deferred.   Signed by: Mich Reveles 11/30/2023 2:13 PM Dictation workstation:   [id number]    CT abdomen pelvis w IV contrast    Result Date: 11/28/2023  Interpreted By:  Richard Gilman, STUDY: CT ABDOMEN PELVIS W IV CONTRAST; 11/28/2023 10:22 am   INDICATION: Signs/Symptoms:hx of diverticulitis, left lower quadrant pain;   COMPARISON: 07/10/2020   ACCESSION NUMBER(S): [id number]   ORDERING CLINICIAN: KAVON RIZZO   TECHNIQUE: Contiguous axial images of the abdomen/pelvis were performed with IV contrast. 75 ml of Omnipaque 350 was utilized. Coronal and sagittal reformatted images were also obtained. All CT examinations are performed with 1 or more of the following dose reduction techniques: Automated exposure  control, adjustment of mA and/or kv according to patient's size, or use of iterative reconstruction techniques.     FINDINGS: The liver, gallbladder,  common bile duct, pancreas, spleen, and adrenal glands are unremarkable.   The kidneys enhance symmetrically. No urolithiasis is seen. No hydroureteronephrosis is seen.   The visualized aorta is unremarkable.   The small bowel is not dilated. The appendix is not visualized. There is acute diverticulitis at the mid-distal sigmoid colon. There is abscess formation along the serosal surface width fluid and gas formation. The abscess measures approximately 4.4 x 3.4 cm in maximum dimensions.   The bladder is normally distended with no gross wall thickening.   The visualized osseous structures are intact.   Limited images of the lower thorax are unremarkable.       acute diverticulitis at the mid-distal sigmoid colon. There is abscess formation along the serosal surface width fluid and gas formation. The abscess measures approximately 4.4 x 3.4 cm in maximum dimensions.   Signed by: Richard Gilman 11/28/2023 11:18 AM Dictation workstation:   [id number]           Assessment/Plan   Diverticulitis with abscess  Diarrhea-resolved    Continue IV zosyn  Monitor stools  IR was consulted for drainage-abscess too small to drain  Monitor temp and wbc  Supportive care  General surgery follow up    Plan to de-escalate to oral Augmentin for total 14 days-plan discussed with patient         ROWDY Quiñonez-CNP

## 2023-12-15 NOTE — CARE PLAN
The patient's goals for the shift include      The clinical goals for the shift include Patient will tolerate advancing diet and antibiotics needed for infection      Problem: Fall/Injury  Goal: Not fall by end of shift  Outcome: Progressing  Goal: Be free from injury by end of the shift  Outcome: Progressing  Goal: Verbalize understanding of personal risk factors for fall in the hospital  Outcome: Progressing  Goal: Verbalize understanding of risk factor reduction measures to prevent injury from fall in the home  Outcome: Progressing  Goal: Use assistive devices by end of the shift  Outcome: Progressing  Goal: Pace activities to prevent fatigue by end of the shift  Outcome: Progressing     Problem: Pain - Adult  Goal: Verbalizes/displays adequate comfort level or baseline comfort level  Outcome: Progressing     Problem: Safety - Adult  Goal: Free from fall injury  Outcome: Progressing     Problem: Safety - Adult  Goal: Free from fall injury  Outcome: Progressing     Problem: Discharge Planning  Goal: Discharge to home or other facility with appropriate resources  Outcome: Progressing     Problem: Chronic Conditions and Co-morbidities  Goal: Patient's chronic conditions and co-morbidity symptoms are monitored and maintained or improved  Outcome: Progressing     Problem: Pain  Goal: Takes deep breaths with improved pain control throughout the shift  Outcome: Progressing  Goal: Turns in bed with improved pain control throughout the shift  Outcome: Progressing  Goal: Walks with improved pain control throughout the shift  Outcome: Progressing

## 2023-12-15 NOTE — PROGRESS NOTES
Tom Treviño is a 53 y.o. male on day 4 of admission presenting with Diverticulitis.      Subjective   He had some transient left lower quadrant low-level pain last night but this is all resolved.  Surgical nostra message reviewed         Objective     Last Recorded Vitals  /80 (BP Location: Left arm, Patient Position: Sitting)   Pulse 57   Temp 36.5 °C (97.7 °F) (Oral)   Resp 17   Wt 118 kg (259 lb 4.2 oz)   SpO2 98%   Intake/Output last 3 Shifts:    Intake/Output Summary (Last 24 hours) at 12/15/2023 0938  Last data filed at 12/15/2023 0739  Gross per 24 hour   Intake 750 ml   Output --   Net 750 ml       Physical Exam  Alert oriented x 3 cooperative no distress  Chest clear  Heart regular  Abdomen soft nontender really benign exam  Neurologic exam nonfocal  Relevant Results  Labs reviewed  Assessment/Plan     Principal Problem:    Diverticulitis      Will advance diet to soft and will continue to monitor             Ben Higgins MD

## 2023-12-16 ENCOUNTER — PHARMACY VISIT (OUTPATIENT)
Dept: PHARMACY | Facility: CLINIC | Age: 53
End: 2023-12-16
Payer: MEDICARE

## 2023-12-16 VITALS
RESPIRATION RATE: 20 BRPM | BODY MASS INDEX: 33.27 KG/M2 | TEMPERATURE: 97.9 F | HEART RATE: 66 BPM | WEIGHT: 259.26 LBS | DIASTOLIC BLOOD PRESSURE: 59 MMHG | SYSTOLIC BLOOD PRESSURE: 108 MMHG | OXYGEN SATURATION: 98 % | HEIGHT: 74 IN

## 2023-12-16 LAB
ALBUMIN SERPL-MCNC: 3.5 G/DL (ref 3.5–5)
ALP BLD-CCNC: 79 U/L (ref 35–125)
ALT SERPL-CCNC: 41 U/L (ref 5–40)
ANION GAP SERPL CALC-SCNC: 8 MMOL/L
AST SERPL-CCNC: 29 U/L (ref 5–40)
BASOPHILS # BLD AUTO: 0.02 X10*3/UL (ref 0–0.1)
BASOPHILS NFR BLD AUTO: 0.3 %
BILIRUB SERPL-MCNC: 0.3 MG/DL (ref 0.1–1.2)
BUN SERPL-MCNC: 6 MG/DL (ref 8–25)
CALCIUM SERPL-MCNC: 8.9 MG/DL (ref 8.5–10.4)
CHLORIDE SERPL-SCNC: 106 MMOL/L (ref 97–107)
CO2 SERPL-SCNC: 25 MMOL/L (ref 24–31)
CREAT SERPL-MCNC: 1.2 MG/DL (ref 0.4–1.6)
EOSINOPHIL # BLD AUTO: 0.17 X10*3/UL (ref 0–0.7)
EOSINOPHIL NFR BLD AUTO: 2.3 %
ERYTHROCYTE [DISTWIDTH] IN BLOOD BY AUTOMATED COUNT: 11.6 % (ref 11.5–14.5)
GFR SERPL CREATININE-BSD FRML MDRD: 71 ML/MIN/1.73M*2
GLUCOSE SERPL-MCNC: 97 MG/DL (ref 65–99)
HCT VFR BLD AUTO: 41.9 % (ref 41–52)
HGB BLD-MCNC: 14 G/DL (ref 13.5–17.5)
IMM GRANULOCYTES # BLD AUTO: 0.02 X10*3/UL (ref 0–0.7)
IMM GRANULOCYTES NFR BLD AUTO: 0.3 % (ref 0–0.9)
LYMPHOCYTES # BLD AUTO: 2.93 X10*3/UL (ref 1.2–4.8)
LYMPHOCYTES NFR BLD AUTO: 38.8 %
MCH RBC QN AUTO: 31.8 PG (ref 26–34)
MCHC RBC AUTO-ENTMCNC: 33.4 G/DL (ref 32–36)
MCV RBC AUTO: 95 FL (ref 80–100)
MONOCYTES # BLD AUTO: 0.6 X10*3/UL (ref 0.1–1)
MONOCYTES NFR BLD AUTO: 7.9 %
NEUTROPHILS # BLD AUTO: 3.81 X10*3/UL (ref 1.2–7.7)
NEUTROPHILS NFR BLD AUTO: 50.4 %
NRBC BLD-RTO: 0 /100 WBCS (ref 0–0)
PLATELET # BLD AUTO: 255 X10*3/UL (ref 150–450)
POTASSIUM SERPL-SCNC: 4 MMOL/L (ref 3.4–5.1)
PROT SERPL-MCNC: 7.4 G/DL (ref 5.9–7.9)
RBC # BLD AUTO: 4.4 X10*6/UL (ref 4.5–5.9)
SODIUM SERPL-SCNC: 139 MMOL/L (ref 133–145)
WBC # BLD AUTO: 7.6 X10*3/UL (ref 4.4–11.3)

## 2023-12-16 PROCEDURE — 85025 COMPLETE CBC W/AUTO DIFF WBC: CPT | Performed by: INTERNAL MEDICINE

## 2023-12-16 PROCEDURE — 2500000004 HC RX 250 GENERAL PHARMACY W/ HCPCS (ALT 636 FOR OP/ED): Performed by: INTERNAL MEDICINE

## 2023-12-16 PROCEDURE — 36415 COLL VENOUS BLD VENIPUNCTURE: CPT | Performed by: INTERNAL MEDICINE

## 2023-12-16 PROCEDURE — 84075 ASSAY ALKALINE PHOSPHATASE: CPT | Performed by: INTERNAL MEDICINE

## 2023-12-16 PROCEDURE — 2500000004 HC RX 250 GENERAL PHARMACY W/ HCPCS (ALT 636 FOR OP/ED): Performed by: NURSE PRACTITIONER

## 2023-12-16 PROCEDURE — RXMED WILLOW AMBULATORY MEDICATION CHARGE

## 2023-12-16 RX ORDER — AMOXICILLIN AND CLAVULANATE POTASSIUM 875; 125 MG/1; MG/1
1 TABLET, FILM COATED ORAL 2 TIMES DAILY
Qty: 14 TABLET | Refills: 0 | Status: SHIPPED | OUTPATIENT
Start: 2023-12-16 | End: 2023-12-23

## 2023-12-16 RX ORDER — ACETAMINOPHEN 325 MG/1
650 TABLET ORAL EVERY 4 HOURS PRN
Qty: 30 TABLET | Refills: 0 | Status: SHIPPED | OUTPATIENT
Start: 2023-12-16 | End: 2024-01-05 | Stop reason: HOSPADM

## 2023-12-16 RX ADMIN — PIPERACILLIN SODIUM AND TAZOBACTAM SODIUM 3.38 G: 3; .375 INJECTION, SOLUTION INTRAVENOUS at 02:15

## 2023-12-16 RX ADMIN — PIPERACILLIN SODIUM AND TAZOBACTAM SODIUM 3.38 G: 3; .375 INJECTION, SOLUTION INTRAVENOUS at 09:24

## 2023-12-16 RX ADMIN — PANTOPRAZOLE SODIUM 40 MG: 40 TABLET, DELAYED RELEASE ORAL at 05:35

## 2023-12-16 ASSESSMENT — COGNITIVE AND FUNCTIONAL STATUS - GENERAL
DAILY ACTIVITIY SCORE: 24
MOBILITY SCORE: 24

## 2023-12-16 ASSESSMENT — PAIN SCALES - GENERAL: PAINLEVEL_OUTOF10: 0 - NO PAIN

## 2023-12-16 ASSESSMENT — ACTIVITIES OF DAILY LIVING (ADL): LACK_OF_TRANSPORTATION: NO

## 2023-12-16 NOTE — PROGRESS NOTES
Tom Treviño is a 53 y.o. male on day 5 of admission presenting with Diverticulitis.    Subjective   Interval History:    awake, alert  Afebrile, no chills  No chest pain or shortness of breath  No nausea vomiting or diarrhea        Review of Systems   All other systems reviewed and are negative.      Objective   Range of Vitals (last 24 hours)  Heart Rate:  [65-69]   Temp:  [36.4 °C (97.5 °F)-36.6 °C (97.9 °F)]   Resp:  [16-18]   BP: (132-165)/(76-82)   SpO2:  [97 %-98 %]   Daily Weight  12/10/23 : 118 kg (259 lb 4.2 oz)    Body mass index is 33.29 kg/m².    Physical Exam  Constitutional:       Appearance: Normal appearance.   HENT:      Head: Normocephalic and atraumatic.      Nose: Nose normal.      Mouth/Throat:      Mouth: Mucous membranes are moist.      Pharynx: Oropharynx is clear.   Eyes:      Extraocular Movements: Extraocular movements intact.      Conjunctiva/sclera: Conjunctivae normal.   Cardiovascular:      Rate and Rhythm: Normal rate and regular rhythm.   Pulmonary:      Effort: Pulmonary effort is normal.      Breath sounds: Normal breath sounds.   Abdominal:      General: Bowel sounds are normal.      Palpations: Abdomen is soft.   Musculoskeletal:         General: Normal range of motion.      Cervical back: Normal range of motion and neck supple.   Skin:     General: Skin is warm and dry.   Neurological:      General: No focal deficit present.      Mental Status: He is alert and oriented to person, place, and time. Mental status is at baseline.   Psychiatric:         Mood and Affect: Mood normal.         Behavior: Behavior normal.     Antibiotics  iohexol (OMNIPaque) 350 mg iodine/mL solution 75 mL  dicyclomine (Bentyl) tablet 20 mg  sodium chloride 0.9 % bolus 500 mL  ondansetron (Zofran) injection 4 mg  ketorolac (Toradol) injection 15 mg  famotidine PF (Pepcid) injection 20 mg  piperacillin-tazobactam-dextrose (Zosyn) IV 3.375 g  hydroCHLOROthiazide (HYDRODiuril) tablet 25 mg  lansoprazole  "(Prevacid) DR capsule 30 mg  ciprofloxacin (Cipro) IVPB 400 mg  metroNIDAZOLE in NaCl (iso-os) (Flagyl)  mg  enoxaparin (Lovenox) syringe 40 mg  sodium chloride 0.9 % with KCl 20 mEq/L infusion  acetaminophen (Tylenol) tablet 650 mg  acetaminophen (Tylenol) oral liquid 650 mg  acetaminophen (Tylenol) suppository 650 mg  acetaminophen (Tylenol) tablet 650 mg  acetaminophen (Tylenol) oral liquid 650 mg  acetaminophen (Tylenol) suppository 650 mg  ibuprofen tablet 400 mg  polyethylene glycol (Glycolax, Miralax) packet 17 g  ondansetron ODT (Zofran-ODT) disintegrating tablet 4 mg  ondansetron (Zofran) injection 4 mg  morphine injection 4 mg  vancomycin-diluent combo no.1 (Xellia) IVPB 2,000 mg  pantoprazole (ProtoNix) EC tablet 40 mg  piperacillin-tazobactam-dextrose (Zosyn) IV 3.375 g      Relevant Results  Labs  Results from last 72 hours   Lab Units 12/16/23  0418 12/15/23  0507 12/14/23  0435   WBC AUTO x10*3/uL 7.6 8.8 8.6   HEMOGLOBIN g/dL 14.0 14.1 13.6   HEMATOCRIT % 41.9 42.4 40.1*   PLATELETS AUTO x10*3/uL 255 258 254   NEUTROS PCT AUTO % 50.4 58.3 58.9   LYMPHS PCT AUTO % 38.8 31.7 30.9   MONOS PCT AUTO % 7.9 7.6 7.7   EOS PCT AUTO % 2.3 1.9 2.1     Results from last 72 hours   Lab Units 12/16/23  0418 12/15/23  0508 12/14/23  0435   SODIUM mmol/L 139 140 140   POTASSIUM mmol/L 4.0 3.8 3.6   CHLORIDE mmol/L 106 107 106   CO2 mmol/L 25 24 25   BUN mg/dL 6* 4* 5*   CREATININE mg/dL 1.20 1.20 1.10   GLUCOSE mg/dL 97 96 97   CALCIUM mg/dL 8.9 8.8 9.0   ANION GAP mmol/L 8 9 9   EGFR mL/min/1.73m*2 71 71 79     Results from last 72 hours   Lab Units 12/16/23  0418 12/15/23  0508 12/14/23  0435   ALK PHOS U/L 79 75 77   BILIRUBIN TOTAL mg/dL 0.3 0.4 0.3   PROTEIN TOTAL g/dL 7.4 6.9 7.1   ALT U/L 41* 36 32   AST U/L 29 24 23   ALBUMIN g/dL 3.5 3.3* 3.5     Estimated Creatinine Clearance: 94.9 mL/min (by C-G formula based on SCr of 1.2 mg/dL).  No results found for: \"CRP\"  Microbiology   reviewed  Imaging  CT " abdomen pelvis w IV contrast    Result Date: 12/10/2023  Interpreted By:  Richard Gilman, STUDY: CT ABDOMEN PELVIS W IV CONTRAST; 12/10/2023 10:14 pm   INDICATION: Abscess left side of abdomen, continued abdominal pain   COMPARISON: 11/28/2023   ACCESSION NUMBER(S): [id number]   ORDERING CLINICIAN: JASON OLIVAS   TECHNIQUE: Contiguous axial images of the abdomen/pelvis were performed with IV contrast. 75 ml of Omnipaque 350 was utilized. Coronal and sagittal reformatted images were also obtained. All CT examinations are performed with 1 or more of the following dose reduction techniques: Automated exposure control, adjustment of mA and/or kv according to patient's size, or use of iterative reconstruction techniques.     FINDINGS: The liver, gallbladder,  common bile duct, pancreas, spleen, and adrenal glands are unremarkable.   The kidneys enhance symmetrically. No urolithiasis is seen. No hydroureteronephrosis is seen.   The visualized aorta is unremarkable.   The small bowel is not dilated. Again seen is a pericolonic abscess in the mid sigmoid colon with diffuse circumferential wall thickening and serosal phlegmonous changes. There is a serosal based collection measuring 2.7 x 2.6 cm as well as smaller collections along the serosal surface. Overall the degree of phlegmonous changes stable to minimally worsened since the prior study.   The small abscess abuts the dome of the bladder without evidence of a fistulous tract.   No free intraperitoneal air or fluid is seen.   The bladder is well distended with no gross wall thickening.   The visualized osseous structures are intact.   Limited images of the lower thorax are unremarkable.       Again seen is a pericolonic abscess in the mid sigmoid colon with diffuse circumferential wall thickening and serosal phlegmonous changes. There is a serosal based collection measuring 2.7 x 2.6 cm as well as smaller collections along the serosal surface. Overall the degree  of phlegmonous changes stable to minimally worsened since the prior study.   Signed by: Richard Gilman 12/10/2023 11:07 PM Dictation workstation:   [id number]    CT guided percutaneous peritoneal or retroperitoneal fluid collection drainage    Result Date: 11/30/2023  Interpreted By:  Mich Reveles, STUDY: CT GUIDED PERCUTANEOUS PERITONEAL OR RETROPERITONEAL FLUID COLLECTION DRAINAGE; 11/28/2023 4:20 pm   INDICATION: Signs/Symptoms:diverticular abcess.   COMPARISON: 6 hours earlier same day   ACCESSION NUMBER(S): [id number]   ORDERING CLINICIAN: MICH REVELES   TECHNIQUE: Axial CT images of the pelvis in preparation for abscess drainage   FINDINGS: Informed consent obtained. Patient positioned supine. Axial CT images of the pelvis obtained in preparation for drainage of previously identified small diverticular abscess. The exam reveals interval reduction in volume of abscess adjacent proximal-mid sigmoid colon, now 1.9 cm. Given improvement in a very short time on antibiotics the drainage was deferred.       Reduction in size of small diverticular abscess sigmoid colon, plain drainage was deferred.   Signed by: Mich Reveles 11/30/2023 2:13 PM Dictation workstation:   [id number]    CT abdomen pelvis w IV contrast    Result Date: 11/28/2023  Interpreted By:  Richard Gilman, STUDY: CT ABDOMEN PELVIS W IV CONTRAST; 11/28/2023 10:22 am   INDICATION: Signs/Symptoms:hx of diverticulitis, left lower quadrant pain;   COMPARISON: 07/10/2020   ACCESSION NUMBER(S): [id number]   ORDERING CLINICIAN: KAVON RIZZO   TECHNIQUE: Contiguous axial images of the abdomen/pelvis were performed with IV contrast. 75 ml of Omnipaque 350 was utilized. Coronal and sagittal reformatted images were also obtained. All CT examinations are performed with 1 or more of the following dose reduction techniques: Automated exposure control, adjustment of mA and/or kv according to patient's size, or use of iterative reconstruction  techniques.     FINDINGS: The liver, gallbladder,  common bile duct, pancreas, spleen, and adrenal glands are unremarkable.   The kidneys enhance symmetrically. No urolithiasis is seen. No hydroureteronephrosis is seen.   The visualized aorta is unremarkable.   The small bowel is not dilated. The appendix is not visualized. There is acute diverticulitis at the mid-distal sigmoid colon. There is abscess formation along the serosal surface width fluid and gas formation. The abscess measures approximately 4.4 x 3.4 cm in maximum dimensions.   The bladder is normally distended with no gross wall thickening.   The visualized osseous structures are intact.   Limited images of the lower thorax are unremarkable.       acute diverticulitis at the mid-distal sigmoid colon. There is abscess formation along the serosal surface width fluid and gas formation. The abscess measures approximately 4.4 x 3.4 cm in maximum dimensions.   Signed by: Richard Gilman 11/28/2023 11:18 AM Dictation workstation:   [id number]     Assessment/Plan   Diverticulitis with abscess  Diarrhea-resolved     Continue IV zosyn  Monitor stools  IR was consulted for drainage-abscess too small to drain  Monitor temp and wbc  Supportive care  General surgery follow up   long-term plan is to discharge on Augmentin to complete at least 2 weeks of therapy      Lucian Champion MD

## 2023-12-16 NOTE — DISCHARGE SUMMARY
Discharge Diagnosis  Patient Active Problem List   Diagnosis    Abdominal pain    Diverticulitis of intestine with abscess    Diverticulitis         Issues Requiring Follow-Up  Follow-up with GI and surgery    Discharge Meds     Your medication list        START taking these medications        Instructions Last Dose Given Next Dose Due   acetaminophen 325 mg tablet  Commonly known as: Tylenol      Take 2 tablets (650 mg) by mouth every 4 hours if needed for fever (temp greater than 38.0 C) (greater than or equal to 38 C).              CONTINUE taking these medications        Instructions Last Dose Given Next Dose Due   amoxicillin-pot clavulanate 875-125 mg tablet  Commonly known as: Augmentin      Take 1 tablet (875 mg) by mouth 2 times a day for 7 days.       lansoprazole 30 mg DR capsule  Commonly known as: Prevacid                  STOP taking these medications      hydroCHLOROthiazide 25 mg tablet  Commonly known as: HYDRODiuril                  Where to Get Your Medications        These medications were sent to [facility]  [address], [address], [facility] [place] [address]      Hours: 9 AM to 6 PM Mon-Fri, 9 AM to 1 PM Sat Phone: [phone]   acetaminophen 325 mg tablet  amoxicillin-pot clavulanate 875-125 mg tablet         Test Results Pending At Discharge  Pending Labs       No current pending labs.            Hospital Course  This patient was admitted to recurrent diverticulitis with minimal abscess.  He was followed by surgery.  Initial attempts were made to drain the abscess but IR felt this is too little for drainage.  He was treated conservatively that was only gradually advanced and the patient did very well tolerating soft food diet for the last 2 days.  Minimal to 0 pain.  Surgery signed off ID recommended to complete 2-week course of antibiotic.  Patient understands the need to follow-up with GI for colonoscopy and the need to follow-up with surgery colorectal for consideration of  partial sigmoid resection    Pertinent Physical Exam At Time of Discharge  Alert Freddy x 3 cooperative no distress  Chest clear  Heart regular  Abdomen soft nontender  Extremities no edema  Neurologic exam grossly motor sensor nonfocal    Outpatient Follow-Up  Per chart    Time spent on discharge arrangement:  45 minutes    Ben Higgins MD

## 2023-12-16 NOTE — CARE PLAN
The patient's goals for the shift include  discharge    The clinical goals for the shift include discharge    Over the shift, the patient did not make progress toward the following goals. Barriers to progression include none. Recommendations to address these barriers include none.

## 2023-12-18 ENCOUNTER — DOCUMENTATION (OUTPATIENT)
Dept: PRIMARY CARE | Facility: CLINIC | Age: 53
End: 2023-12-18
Payer: COMMERCIAL

## 2023-12-18 ENCOUNTER — PATIENT OUTREACH (OUTPATIENT)
Dept: PRIMARY CARE | Facility: CLINIC | Age: 53
End: 2023-12-18
Payer: COMMERCIAL

## 2023-12-18 NOTE — PROGRESS NOTES
Discharge Facility: [facility]  Discharge Diagnosis: Diverticulitis with abscess  Admission Date: 12/11/2023  Discharge Date: 12/16/2023    PCP Appointment Date: Tasked to office, no contact  Specialist Appointment Date: GI 01/18/2024  Hospital Encounter and Summary: Linked    2 attempts were made to reach patient to assess needs. No return call as of this note.

## 2023-12-19 ENCOUNTER — TELEPHONE (OUTPATIENT)
Dept: PRIMARY CARE | Facility: CLINIC | Age: 53
End: 2023-12-19
Payer: COMMERCIAL

## 2023-12-19 NOTE — TELEPHONE ENCOUNTER
----- Message from Magui Mckeon LPN sent at 12/18/2023  2:45 PM EST -----  Regarding: FW: TCM Appointment    ----- Message -----  From: Ayse Delacruz  Sent: 12/18/2023   2:36 PM EST  To:  [id number]  Subject: TCM Appointment                                  Discharge facility: [facility]  Discharge diagnosis: Diverticulitis with abscess    Date of discharge: 12/16/2023      Unsuccessful attempts x2 to reach patient for PCP Follow-up  Please have office staff reach out to patient and schedule an appointment within 7-13 days from discharge date.      Thank you

## 2023-12-20 ENCOUNTER — TELEPHONE (OUTPATIENT)
Dept: PRIMARY CARE | Facility: CLINIC | Age: 53
End: 2023-12-20
Payer: COMMERCIAL

## 2023-12-20 NOTE — TELEPHONE ENCOUNTER
----- Message from Magui Mckeon LPN sent at 12/18/2023  2:45 PM EST -----  Regarding: FW: TCM Appointment    ----- Message -----  From: Ayse Delacruz  Sent: 12/18/2023   2:36 PM EST  To:  [email or web address]  Subject: TCM Appointment                                  Discharge facility: [facility]  Discharge diagnosis: Diverticulitis with abscess    Date of discharge: 12/16/2023      Unsuccessful attempts x2 to reach patient for PCP Follow-up  Please have office staff reach out to patient and schedule an appointment within 7-13 days from discharge date.      Thank you

## 2023-12-21 ENCOUNTER — APPOINTMENT (OUTPATIENT)
Dept: RADIOLOGY | Facility: HOSPITAL | Age: 53
End: 2023-12-21
Payer: COMMERCIAL

## 2023-12-21 ENCOUNTER — HOSPITAL ENCOUNTER (EMERGENCY)
Facility: HOSPITAL | Age: 53
Discharge: HOME | End: 2023-12-21
Attending: EMERGENCY MEDICINE
Payer: COMMERCIAL

## 2023-12-21 VITALS
RESPIRATION RATE: 16 BRPM | HEIGHT: 74 IN | WEIGHT: 251.9 LBS | TEMPERATURE: 99.5 F | OXYGEN SATURATION: 100 % | SYSTOLIC BLOOD PRESSURE: 142 MMHG | HEART RATE: 75 BPM | DIASTOLIC BLOOD PRESSURE: 85 MMHG | BODY MASS INDEX: 32.33 KG/M2

## 2023-12-21 DIAGNOSIS — K57.20 COLONIC DIVERTICULAR ABSCESS: Primary | ICD-10-CM

## 2023-12-21 LAB
ALBUMIN SERPL-MCNC: 3.9 G/DL (ref 3.5–5)
ALP BLD-CCNC: 90 U/L (ref 35–125)
ALT SERPL-CCNC: 36 U/L (ref 5–40)
ANION GAP SERPL CALC-SCNC: 13 MMOL/L
APPEARANCE UR: CLEAR
AST SERPL-CCNC: 18 U/L (ref 5–40)
BASOPHILS # BLD AUTO: 0.02 X10*3/UL (ref 0–0.1)
BASOPHILS NFR BLD AUTO: 0.2 %
BILIRUB SERPL-MCNC: 0.7 MG/DL (ref 0.1–1.2)
BILIRUB UR STRIP.AUTO-MCNC: NEGATIVE MG/DL
BUN SERPL-MCNC: 7 MG/DL (ref 8–25)
CALCIUM SERPL-MCNC: 9.1 MG/DL (ref 8.5–10.4)
CHLORIDE SERPL-SCNC: 97 MMOL/L (ref 97–107)
CO2 SERPL-SCNC: 24 MMOL/L (ref 24–31)
COLOR UR: NORMAL
CREAT SERPL-MCNC: 1 MG/DL (ref 0.4–1.6)
EOSINOPHIL # BLD AUTO: 0.07 X10*3/UL (ref 0–0.7)
EOSINOPHIL NFR BLD AUTO: 0.6 %
ERYTHROCYTE [DISTWIDTH] IN BLOOD BY AUTOMATED COUNT: 11.8 % (ref 11.5–14.5)
GFR SERPL CREATININE-BSD FRML MDRD: 89 ML/MIN/1.73M*2
GLUCOSE SERPL-MCNC: 103 MG/DL (ref 65–99)
GLUCOSE UR STRIP.AUTO-MCNC: NORMAL MG/DL
HCT VFR BLD AUTO: 44.3 % (ref 41–52)
HGB BLD-MCNC: 15 G/DL (ref 13.5–17.5)
IMM GRANULOCYTES # BLD AUTO: 0.02 X10*3/UL (ref 0–0.7)
IMM GRANULOCYTES NFR BLD AUTO: 0.2 % (ref 0–0.9)
KETONES UR STRIP.AUTO-MCNC: NEGATIVE MG/DL
LEUKOCYTE ESTERASE UR QL STRIP.AUTO: NEGATIVE
LYMPHOCYTES # BLD AUTO: 2.44 X10*3/UL (ref 1.2–4.8)
LYMPHOCYTES NFR BLD AUTO: 19.6 %
MCH RBC QN AUTO: 31.2 PG (ref 26–34)
MCHC RBC AUTO-ENTMCNC: 33.9 G/DL (ref 32–36)
MCV RBC AUTO: 92 FL (ref 80–100)
MONOCYTES # BLD AUTO: 0.79 X10*3/UL (ref 0.1–1)
MONOCYTES NFR BLD AUTO: 6.3 %
NEUTROPHILS # BLD AUTO: 9.14 X10*3/UL (ref 1.2–7.7)
NEUTROPHILS NFR BLD AUTO: 73.1 %
NITRITE UR QL STRIP.AUTO: NEGATIVE
NRBC BLD-RTO: 0 /100 WBCS (ref 0–0)
PH UR STRIP.AUTO: 5.5 [PH]
PLATELET # BLD AUTO: 286 X10*3/UL (ref 150–450)
POTASSIUM SERPL-SCNC: 3.3 MMOL/L (ref 3.4–5.1)
PROT SERPL-MCNC: 7.7 G/DL (ref 5.9–7.9)
PROT UR STRIP.AUTO-MCNC: NEGATIVE MG/DL
RBC # BLD AUTO: 4.81 X10*6/UL (ref 4.5–5.9)
RBC # UR STRIP.AUTO: NEGATIVE /UL
SODIUM SERPL-SCNC: 134 MMOL/L (ref 133–145)
SP GR UR STRIP.AUTO: 1.01
UROBILINOGEN UR STRIP.AUTO-MCNC: NORMAL MG/DL
WBC # BLD AUTO: 12.5 X10*3/UL (ref 4.4–11.3)

## 2023-12-21 PROCEDURE — 80053 COMPREHEN METABOLIC PANEL: CPT

## 2023-12-21 PROCEDURE — 74177 CT ABD & PELVIS W/CONTRAST: CPT

## 2023-12-21 PROCEDURE — 99284 EMERGENCY DEPT VISIT MOD MDM: CPT | Mod: 25 | Performed by: EMERGENCY MEDICINE

## 2023-12-21 PROCEDURE — 36415 COLL VENOUS BLD VENIPUNCTURE: CPT

## 2023-12-21 PROCEDURE — 85025 COMPLETE CBC W/AUTO DIFF WBC: CPT

## 2023-12-21 PROCEDURE — 2550000001 HC RX 255 CONTRASTS

## 2023-12-21 PROCEDURE — 81003 URINALYSIS AUTO W/O SCOPE: CPT

## 2023-12-21 RX ORDER — CEFDINIR 300 MG/1
300 CAPSULE ORAL 2 TIMES DAILY
Qty: 14 CAPSULE | Refills: 0 | Status: SHIPPED | OUTPATIENT
Start: 2023-12-21 | End: 2024-01-05 | Stop reason: HOSPADM

## 2023-12-21 RX ORDER — METRONIDAZOLE 500 MG/1
500 TABLET ORAL 3 TIMES DAILY
Qty: 21 TABLET | Refills: 0 | Status: SHIPPED | OUTPATIENT
Start: 2023-12-21 | End: 2024-01-05 | Stop reason: HOSPADM

## 2023-12-21 RX ADMIN — IOHEXOL 75 ML: 350 INJECTION, SOLUTION INTRAVENOUS at 17:49

## 2023-12-21 ASSESSMENT — PAIN - FUNCTIONAL ASSESSMENT
PAIN_FUNCTIONAL_ASSESSMENT: 0-10
PAIN_FUNCTIONAL_ASSESSMENT: 0-10

## 2023-12-21 ASSESSMENT — LIFESTYLE VARIABLES
REASON UNABLE TO ASSESS: NO
EVER HAD A DRINK FIRST THING IN THE MORNING TO STEADY YOUR NERVES TO GET RID OF A HANGOVER: NO
EVER FELT BAD OR GUILTY ABOUT YOUR DRINKING: NO
HAVE YOU EVER FELT YOU SHOULD CUT DOWN ON YOUR DRINKING: NO
HAVE PEOPLE ANNOYED YOU BY CRITICIZING YOUR DRINKING: NO

## 2023-12-21 ASSESSMENT — COLUMBIA-SUICIDE SEVERITY RATING SCALE - C-SSRS
6. HAVE YOU EVER DONE ANYTHING, STARTED TO DO ANYTHING, OR PREPARED TO DO ANYTHING TO END YOUR LIFE?: NO
2. HAVE YOU ACTUALLY HAD ANY THOUGHTS OF KILLING YOURSELF?: NO
1. IN THE PAST MONTH, HAVE YOU WISHED YOU WERE DEAD OR WISHED YOU COULD GO TO SLEEP AND NOT WAKE UP?: NO

## 2023-12-21 ASSESSMENT — PAIN SCALES - GENERAL
PAINLEVEL_OUTOF10: 0 - NO PAIN
PAINLEVEL_OUTOF10: 0 - NO PAIN

## 2023-12-21 NOTE — ED PROVIDER NOTES
HPI   Chief Complaint   Patient presents with    Abdominal Pain     Pt c/o lower abdominal cramping. States he was recently admitted for an abscess on colon. States his surgeon told him to come in for a CT scan.        Patient is a 53-year-old male presenting for evaluation of lower abdominal cramping and diarrhea that started 2 days ago.  Patient has a known diverticular abscess.  He has been in the hospital admitted twice now once in November and wants recently discharged on Saturday for IV antibiotics.  Per IR note the abscess is too small to drain.  He is currently on Augmentin outpatient for treatment.  He states that starting yesterday he has noticed increased diarrhea and abdominal cramping.  He is also had some burning with urination.  States he spoke to a surgeon over the phone today who recommended he present to ER for additional scan to see if the abscess has gotten larger.  He denies recent fever or chills.    Dictation #1  MRN:[id number]  CSN:[id number]                     Malu Coma Scale Score: 15                  Patient History   Past Medical History:   Diagnosis Date    COVID-19     COVID-19    Diverticulitis     GERD (gastroesophageal reflux disease)     HTN (hypertension)      Past Surgical History:   Procedure Laterality Date    APPENDECTOMY  02/17/2015    Appendectomy    CT GUIDED PERCUTANEOUS PERITONEAL OR RETROPERITONEAL FLUID COLLECTION DRAINAGE  11/28/2023    CT GUIDED PERCUTANEOUS PERITONEAL OR RETROPERITONEAL FLUID COLLECTION DRAINAGE 11/28/2023 [facility]     No family history on file.  Social History     Tobacco Use    Smoking status: Never    Smokeless tobacco: Never   Substance Use Topics    Alcohol use: Yes     Comment: OCCASIONALLY    Drug use: Never       Physical Exam   ED Triage Vitals [12/21/23 1623]   Temp Heart Rate Resp BP   36.9 °C (98.4 °F) 80 20 135/85      SpO2 Temp Source Heart Rate Source Patient Position   100 % Temporal -- Sitting      BP Location FiO2 (%)     -- --        Physical Exam  Vitals and nursing note reviewed.   Constitutional:       General: He is not in acute distress.     Appearance: He is obese.   HENT:      Head: Normocephalic and atraumatic.   Cardiovascular:      Rate and Rhythm: Normal rate and regular rhythm.   Pulmonary:      Effort: Pulmonary effort is normal.      Breath sounds: Normal breath sounds.   Abdominal:      General: Abdomen is flat. Bowel sounds are normal. There is no distension.      Palpations: Abdomen is soft.      Tenderness: There is abdominal tenderness in the suprapubic area. There is no guarding or rebound.      Comments: Right lower quadrant surgical scar from appendectomy.   Skin:     General: Skin is warm and dry.   Neurological:      Mental Status: He is alert and oriented to person, place, and time.   Psychiatric:         Mood and Affect: Mood normal.         Behavior: Behavior normal.         ED Course & [facility]   ED Course as of 12/21/23 2144   Thu Dec 21, 2023   1846 CT scan does show diverticular abscess that is smaller in size compared to previous but still present.  Attempting to reach out to patient's surgeon Dr. Nunez for recommendations at this time. [JJ]   1854 I spoke with Dr. Nunez who states he does not see this patient he only gave recommendations over the phone to go to ER.  He [facility] not come [facility].  He recommended consulting general surgery on-call. [JJ]   1931 I spoke with general surgery on-call Dr. Kowalski.  We discussed the patient case, laboratory studies and CT scan.  His recommendations at this time include outpatient close follow-up with general surgery within the next week in place the patient on cefdinir and Flagyl for 1 week. [JJ]      ED Course User Index  [JJ] Alivia Vickers PA-C         Diagnoses as of 12/21/23 2144   Colonic diverticular abscess       Medical Decision Making  Parts of this chart have been completed using voice recognition software. Please excuse any errors of transcription.  My  thought process and reason for plan has been formulated from the time that I saw the patient until the time of disposition and is not specific to one specific moment during their visit and furthermore my MDM encompasses this entire chart and not only this text box.      HPI: Detailed above.    Exam: A medically appropriate exam performed, outlined above, given the known history and presentation.    History obtained from: Patient    Social Determinants of Health considered during this visit: Lives independently    Medications given during visit:  Medications   iohexol (OMNIPaque) 350 mg iodine/mL solution 75 mL (75 mL intravenous Given 12/21/23 0565)        Diagnostic/tests  Labs Reviewed   CBC WITH AUTO DIFFERENTIAL - Abnormal       Result Value    WBC 12.5 (*)     nRBC 0.0      RBC 4.81      Hemoglobin 15.0      Hematocrit 44.3      MCV 92      MCH 31.2      MCHC 33.9      RDW 11.8      Platelets 286      Neutrophils % 73.1      Immature Granulocytes %, Automated 0.2      Lymphocytes % 19.6      Monocytes % 6.3      Eosinophils % 0.6      Basophils % 0.2      Neutrophils Absolute 9.14 (*)     Immature Granulocytes Absolute, Automated 0.02      Lymphocytes Absolute 2.44      Monocytes Absolute 0.79      Eosinophils Absolute 0.07      Basophils Absolute 0.02     COMPREHENSIVE METABOLIC PANEL - Abnormal    Glucose 103 (*)     Sodium 134      Potassium 3.3 (*)     Chloride 97      Bicarbonate 24      Urea Nitrogen 7 (*)     Creatinine 1.00      eGFR 89      Calcium 9.1      Albumin 3.9      Alkaline Phosphatase 90      Total Protein 7.7      AST 18      Bilirubin, Total 0.7      ALT 36      Anion Gap 13     URINALYSIS WITH REFLEX CULTURE AND MICROSCOPIC - Normal    Color, Urine Light-Yellow      Appearance, Urine Clear      Specific Gravity, Urine 1.009      pH, Urine 5.5      Protein, Urine NEGATIVE      Glucose, Urine Normal      Blood, Urine NEGATIVE      Ketones, Urine NEGATIVE      Bilirubin, Urine NEGATIVE       Urobilinogen, Urine Normal      Nitrite, Urine NEGATIVE      Leukocyte Esterase, Urine NEGATIVE     URINALYSIS WITH REFLEX CULTURE AND MICROSCOPIC    Narrative:     The following orders were created for panel order Urinalysis with Reflex Culture and Microscopic.  Procedure                               Abnormality         Status                     ---------                               -----------         ------                     Urinalysis with Reflex C...[[id number]]  Normal              Final result               Extra Urine Gray Tube[[id number]]                            In process                   Please view results for these tests on the individual orders.   EXTRA URINE GRAY TUBE      CT abdomen pelvis w IV contrast   Final Result   Again seen is a pericolonic abscess in the mid sigmoid colon with   diffuse circumferential wall thickening and serosal phlegmonous   changes. There is a serosal based collection measuring 2.5 x 2 cm   (previously measuring 2.7 x 2.6 cm) and extending to the bladder   dome. Overall the degree of phlegmonous changes stable since the   prior study.        MACRO:   None        Signed by: Alba Javier 12/21/2023 6:12 PM   Dictation workstation:   [id number]           Considerations/further MDM:  Patient is a 53-year-old male with known diverticular abscess presenting for evaluation of abdominal cramping and diarrhea.  During the ER visit the patient is alert and oriented, calm and cooperative, resting comfortably in exam chair.  His vital signs are within normal limits during the visit.  Patient has a known diverticular abscess and spoke with Dr. Nunez's staff today regarding his increased abdominal cramping and diarrhea and whether he should go to ER and Dr. Haile staff advised him to come to ER for CT scan and further workup to see if the abscess has been shrinking or enlarging.  He does have a mild leukocytosis that has increased since his previous visit last week  likely secondary to his abdominal abscess.  He denies fever or chills.  His CT does show a 2.5 x 2 cm abscess but this has been shrinking compared to previous measurement.  I spoke with general surgery on-call and our discussion is stated in the ED course above.  Given the patient is stable and well-appearing at this time I believe the patient is appropriate for discharge, surgeon agrees.  I discussed his recommendations with the patient for discharge and appropriate antibiotic regimen of cefdinir and Flagyl for 1 week with appropriate follow-up with general surgery.  I gave him a referral for Dr. Nunez for stat priority.  I advised the patient to come back to the ER if he develops fever, chills, increasing abdominal pain.  The patient expressed his understanding of the diagnosis and the follow-up plan at this time.      Procedure  Procedures     Alivia Vickers PA-C  12/21/23 7922

## 2023-12-21 NOTE — ED PROVIDER NOTES
I have seen and examined the patient.  I agree with the KILEY's note as documented.  Medical decision-making made by myself.  Pertinent history and physical exam as below.    Patient seen in conjunction with the PA, 53-year-old male with known diverticular abscess presenting for worsening abdominal pain and diarrhea was told to present to the ER by his surgeon for further evaluation.  Dispo pending CT results possible patient may have increased abdominal discomfort and diarrhea from Augmentin use however worsening abscess still a possibility.     Zully Preciado MD  12/21/23 8120

## 2023-12-22 LAB — HOLD SPECIMEN: NORMAL

## 2023-12-22 NOTE — PROGRESS NOTES
730PM  PA discussed case with Dr. Calzada who reviewed images and labs. Patient is not septic here. Surgery is recommending DC on PO ABX with close OP follow up. PA to discharge patient. Pain controlled, not requiring pain medications in ED.

## 2023-12-27 ENCOUNTER — OFFICE VISIT (OUTPATIENT)
Dept: SURGERY | Facility: CLINIC | Age: 53
End: 2023-12-27
Payer: COMMERCIAL

## 2023-12-27 ENCOUNTER — TELEPHONE (OUTPATIENT)
Dept: SURGERY | Facility: CLINIC | Age: 53
End: 2023-12-27

## 2023-12-27 ENCOUNTER — HOSPITAL ENCOUNTER (OUTPATIENT)
Facility: HOSPITAL | Age: 53
Setting detail: SURGERY ADMIT
DRG: 331 | End: 2023-12-27
Attending: STUDENT IN AN ORGANIZED HEALTH CARE EDUCATION/TRAINING PROGRAM | Admitting: STUDENT IN AN ORGANIZED HEALTH CARE EDUCATION/TRAINING PROGRAM
Payer: COMMERCIAL

## 2023-12-27 VITALS
BODY MASS INDEX: 32.21 KG/M2 | WEIGHT: 251 LBS | RESPIRATION RATE: 17 BRPM | HEART RATE: 74 BPM | DIASTOLIC BLOOD PRESSURE: 77 MMHG | TEMPERATURE: 96 F | HEIGHT: 74 IN | SYSTOLIC BLOOD PRESSURE: 132 MMHG

## 2023-12-27 DIAGNOSIS — K57.20 DIVERTICULITIS OF LARGE INTESTINE WITH ABSCESS WITHOUT BLEEDING: Primary | ICD-10-CM

## 2023-12-27 PROCEDURE — 99214 OFFICE O/P EST MOD 30 MIN: CPT | Performed by: STUDENT IN AN ORGANIZED HEALTH CARE EDUCATION/TRAINING PROGRAM

## 2023-12-27 PROCEDURE — 99204 OFFICE O/P NEW MOD 45 MIN: CPT | Performed by: STUDENT IN AN ORGANIZED HEALTH CARE EDUCATION/TRAINING PROGRAM

## 2023-12-27 PROCEDURE — 1036F TOBACCO NON-USER: CPT | Performed by: STUDENT IN AN ORGANIZED HEALTH CARE EDUCATION/TRAINING PROGRAM

## 2023-12-27 RX ORDER — ACETAMINOPHEN 500 MG
1000 TABLET ORAL ONCE
Status: CANCELLED | OUTPATIENT
Start: 2023-12-27 | End: 2023-12-27

## 2023-12-27 RX ORDER — CEFAZOLIN SODIUM 2 G/100ML
2 INJECTION, SOLUTION INTRAVENOUS ONCE
Status: CANCELLED | OUTPATIENT
Start: 2024-01-02 | End: 2023-12-27

## 2023-12-27 RX ORDER — HEPARIN SODIUM 5000 [USP'U]/ML
5000 INJECTION, SOLUTION INTRAVENOUS; SUBCUTANEOUS ONCE
Status: CANCELLED | OUTPATIENT
Start: 2023-12-27 | End: 2023-12-27

## 2023-12-27 RX ORDER — METRONIDAZOLE 500 MG/100ML
500 INJECTION, SOLUTION INTRAVENOUS ONCE
Status: CANCELLED | OUTPATIENT
Start: 2024-01-02 | End: 2023-12-27

## 2023-12-27 RX ORDER — SODIUM CHLORIDE, SODIUM LACTATE, POTASSIUM CHLORIDE, CALCIUM CHLORIDE 600; 310; 30; 20 MG/100ML; MG/100ML; MG/100ML; MG/100ML
10 INJECTION, SOLUTION INTRAVENOUS CONTINUOUS
Status: CANCELLED | OUTPATIENT
Start: 2024-01-02

## 2023-12-27 ASSESSMENT — PAIN SCALES - GENERAL: PAINLEVEL: 1

## 2023-12-27 ASSESSMENT — ENCOUNTER SYMPTOMS
NAUSEA: 1
FEVER: 0
TROUBLE SWALLOWING: 0
PALPITATIONS: 0
ARTHRALGIAS: 0
OCCASIONAL FEELINGS OF UNSTEADINESS: 0
UNEXPECTED WEIGHT CHANGE: 0
DEPRESSION: 0
SPEECH DIFFICULTY: 0
CHEST TIGHTNESS: 0
ADENOPATHY: 0
WOUND: 0
SHORTNESS OF BREATH: 0
FACIAL ASYMMETRY: 0
VOMITING: 0
SORE THROAT: 0
HEMATURIA: 0
HEADACHES: 0
DIARRHEA: 0
LOSS OF SENSATION IN FEET: 0
BLOOD IN STOOL: 0
DYSURIA: 0
VOICE CHANGE: 0
CHILLS: 0
BRUISES/BLEEDS EASILY: 0
ABDOMINAL PAIN: 1

## 2023-12-27 ASSESSMENT — LIFESTYLE VARIABLES
HOW OFTEN DO YOU HAVE SIX OR MORE DRINKS ON ONE OCCASION: NEVER
HOW OFTEN DO YOU HAVE A DRINK CONTAINING ALCOHOL: NEVER
AUDIT-C TOTAL SCORE: 0
HOW MANY STANDARD DRINKS CONTAINING ALCOHOL DO YOU HAVE ON A TYPICAL DAY: PATIENT DOES NOT DRINK
SKIP TO QUESTIONS 9-10: 1

## 2023-12-27 ASSESSMENT — COLUMBIA-SUICIDE SEVERITY RATING SCALE - C-SSRS
2. HAVE YOU ACTUALLY HAD ANY THOUGHTS OF KILLING YOURSELF?: NO
1. IN THE PAST MONTH, HAVE YOU WISHED YOU WERE DEAD OR WISHED YOU COULD GO TO SLEEP AND NOT WAKE UP?: NO
6. HAVE YOU EVER DONE ANYTHING, STARTED TO DO ANYTHING, OR PREPARED TO DO ANYTHING TO END YOUR LIFE?: NO

## 2023-12-27 ASSESSMENT — PATIENT HEALTH QUESTIONNAIRE - PHQ9
SUM OF ALL RESPONSES TO PHQ9 QUESTIONS 1 & 2: 0
1. LITTLE INTEREST OR PLEASURE IN DOING THINGS: NOT AT ALL
2. FEELING DOWN, DEPRESSED OR HOPELESS: NOT AT ALL

## 2023-12-27 NOTE — TELEPHONE ENCOUNTER
Tried to contact patient regarding case request for surgery with Dr Persaud. No answer at number listed, left vm requesting call back to office to choose surgery date.

## 2023-12-27 NOTE — TELEPHONE ENCOUNTER
Patient returned call to office and chose the surgery date of 01/03/2024. ERAS bowel prep and surgery itinerary emailed to patient (per patient request). Patient verbalized understanding and denied having any other questions at this time.

## 2023-12-27 NOTE — PROGRESS NOTES
History Of Present Illness  Tom Treviño is a 53 y.o. male presenting with  diverticulitis and abscess.  He has had multiple bouts of diverticulitis over the past several years.  Most recently he was admitted to the hospital with an abscess which was noted to be too small to drain.  He was discharged on antibiotics and continue to have abdominal pain.  He was recently seen in the ER where the abscess was slightly smaller.  He was given a new set of antibiotics and reports these are helping however he still has occasional pain, and the antibiotics are causing nausea.  He eats several small meals a day.  No blood in his stool.  His last colonoscopy was in 2019, no polyps.     Past Medical History  Past Medical History:   Diagnosis Date    COVID-19     COVID-19    Diverticulitis     GERD (gastroesophageal reflux disease)     HTN (hypertension)        Surgical History  Past Surgical History:   Procedure Laterality Date    APPENDECTOMY  02/17/2015    Appendectomy    CT GUIDED PERCUTANEOUS PERITONEAL OR RETROPERITONEAL FLUID COLLECTION DRAINAGE  11/28/2023    CT GUIDED PERCUTANEOUS PERITONEAL OR RETROPERITONEAL FLUID COLLECTION DRAINAGE 11/28/2023 [facility]        Social History  He reports that he has never smoked. He has never used smokeless tobacco. He reports current alcohol use. He reports that he does not use drugs.    Family History  Family History   Problem Relation Name Age of Onset    Heart disease Mother      No Known Problems Father      Diverticulitis Brother          Allergies  Esomeprazole magnesium    Review of Systems   Constitutional:  Negative for chills, fever and unexpected weight change.   HENT:  Negative for sneezing, sore throat, trouble swallowing and voice change.    Respiratory:  Negative for chest tightness and shortness of breath.    Cardiovascular:  Negative for chest pain and palpitations.   Gastrointestinal:  Positive for abdominal pain and nausea. Negative for blood in stool, diarrhea and  "vomiting.   Endocrine: Negative for cold intolerance and heat intolerance.   Genitourinary:  Negative for decreased urine volume, dysuria and hematuria.   Musculoskeletal:  Negative for arthralgias and gait problem.   Skin:  Negative for rash and wound.   Neurological:  Negative for facial asymmetry, speech difficulty and headaches.   Hematological:  Negative for adenopathy. Does not bruise/bleed easily.   Psychiatric/Behavioral:  Negative for self-injury and suicidal ideas.         Physical Exam  Vitals and nursing note reviewed.   Constitutional:       Appearance: Normal appearance.   HENT:      Head: Normocephalic and atraumatic.      Mouth/Throat:      Mouth: Mucous membranes are moist.      Pharynx: Oropharynx is clear.   Eyes:      Extraocular Movements: Extraocular movements intact.      Pupils: Pupils are equal, round, and reactive to light.   Cardiovascular:      Rate and Rhythm: Normal rate and regular rhythm.      Pulses: Normal pulses.   Pulmonary:      Effort: Pulmonary effort is normal.      Breath sounds: Normal breath sounds.   Abdominal:      General: There is no distension.      Palpations: Abdomen is soft.      Tenderness: There is abdominal tenderness (Mild left lower quadrant tenderness to deep palpation).   Musculoskeletal:      Cervical back: Normal range of motion and neck supple.   Skin:     General: Skin is warm and dry.   Neurological:      General: No focal deficit present.      Mental Status: He is alert and oriented to person, place, and time.   Psychiatric:         Mood and Affect: Mood normal.         Behavior: Behavior normal.          Last Recorded Vitals  Blood pressure 132/77, pulse 74, temperature 35.6 °C (96 °F), resp. rate 17, height 1.88 m (6' 2\"), weight 114 kg (251 lb).    Relevant Results   CT scans  from November and December of this year reviewed  along with CT scan from 2020.  This year scan showed diverticulitis of the sigmoid colon with an abscess that has remained " around 2 cm.    No abscess noted in 2020.     Assessment/Plan   Problem List Items Addressed This Visit             ICD-10-CM       Gastrointestinal and Abdominal    Diverticulitis of intestine with abscess - Primary K57.80    Relevant Orders    Case Request Operating Room: Resection Laparoscopy Sigmoid Colon and Rectum (Completed)    Request for Pre-Admission Testing Visit      53-year-old male presents with recurrent episodes of diverticulitis.  Most recently he was found to have an abscess that was too small for drainage.  This most recent CT which was about a month after the first 1, shows persistent phlegmon and abscess consistent with smoldering inflammation and infection.  We discussed the plan moving forward and given that he has had smoldering disease and multiple episodes, I recommended resection of the sigmoid colon.  He did have a colonoscopy in 2019 which did not have any polyps, and he has had several episodes since then.   I do not think it is needed to repeat a colonoscopy at this time and we can defer until after his resection.  I discussed the procedure in detail including a robotic assisted laparoscopic approach and need for possible ostomy,  however after looking at his scan, his rectum and descending colon appear healthy and suitable for primary anastomosis and the phlegmon and inflammation is mostly paracolonic and not generalized to the pelvis.  We discussed the expected postoperative course including 2 or 3 days in the hospital until bowel function returns and he is tolerating a diet and the expected course at discharge.  All questions were answered.  Will schedule at his convenience.        Ayse Persaud MD

## 2023-12-28 ENCOUNTER — PRE-ADMISSION TESTING (OUTPATIENT)
Dept: PREADMISSION TESTING | Facility: HOSPITAL | Age: 53
End: 2023-12-28
Payer: COMMERCIAL

## 2023-12-28 ENCOUNTER — LAB REQUISITION (OUTPATIENT)
Dept: LAB | Facility: HOSPITAL | Age: 53
End: 2023-12-28
Payer: COMMERCIAL

## 2023-12-28 VITALS
HEIGHT: 74 IN | SYSTOLIC BLOOD PRESSURE: 133 MMHG | RESPIRATION RATE: 16 BRPM | WEIGHT: 248.68 LBS | OXYGEN SATURATION: 100 % | BODY MASS INDEX: 31.91 KG/M2 | DIASTOLIC BLOOD PRESSURE: 99 MMHG | TEMPERATURE: 96.4 F | HEART RATE: 76 BPM

## 2023-12-28 DIAGNOSIS — K57.20 DIVERTICULITIS OF LARGE INTESTINE WITH PERFORATION AND ABSCESS WITHOUT BLEEDING: ICD-10-CM

## 2023-12-28 DIAGNOSIS — Z01.818 PREOP TESTING: Primary | ICD-10-CM

## 2023-12-28 LAB
ABO GROUP (TYPE) IN BLOOD: NORMAL
ANTIBODY SCREEN: NORMAL
RH FACTOR (ANTIGEN D): NORMAL

## 2023-12-28 PROCEDURE — 93005 ELECTROCARDIOGRAM TRACING: CPT

## 2023-12-28 PROCEDURE — 87081 CULTURE SCREEN ONLY: CPT | Mod: TRILAB

## 2023-12-28 PROCEDURE — 86901 BLOOD TYPING SEROLOGIC RH(D): CPT | Mod: OUT | Performed by: STUDENT IN AN ORGANIZED HEALTH CARE EDUCATION/TRAINING PROGRAM

## 2023-12-28 PROCEDURE — 99204 OFFICE O/P NEW MOD 45 MIN: CPT | Performed by: NURSE PRACTITIONER

## 2023-12-28 PROCEDURE — 36415 COLL VENOUS BLD VENIPUNCTURE: CPT

## 2023-12-28 RX ORDER — HYDROCHLOROTHIAZIDE 25 MG/1
25 TABLET ORAL DAILY
COMMUNITY

## 2023-12-28 RX ORDER — CHLORHEXIDINE GLUCONATE ORAL RINSE 1.2 MG/ML
SOLUTION DENTAL
Qty: 473 ML | Refills: 0 | Status: SHIPPED | OUTPATIENT
Start: 2023-12-28 | End: 2024-01-05 | Stop reason: HOSPADM

## 2023-12-28 ASSESSMENT — ENCOUNTER SYMPTOMS
HEMATOLOGIC/LYMPHATIC NEGATIVE: 1
RESPIRATORY NEGATIVE: 1
ABDOMINAL PAIN: 0
CONSTITUTIONAL NEGATIVE: 1
CARDIOVASCULAR NEGATIVE: 1
EYES NEGATIVE: 1
MUSCULOSKELETAL NEGATIVE: 1
PSYCHIATRIC NEGATIVE: 1
NAUSEA: 1
ENDOCRINE NEGATIVE: 1
NEUROLOGICAL NEGATIVE: 1
ALLERGIC/IMMUNOLOGIC NEGATIVE: 1

## 2023-12-28 ASSESSMENT — CHADS2 SCORE
DIABETES: NO
AGE GREATER THAN OR EQUAL TO 75: NO
PRIOR STROKE OR TIA OR THROMBOEMBOLISM: NO
HYPERTENSION: YES
CHADS2 SCORE: 1
CHF: NO

## 2023-12-28 ASSESSMENT — DUKE ACTIVITY SCORE INDEX (DASI)
CAN YOU RUN A SHORT DISTANCE: NO
CAN YOU DO MODERATE WORK AROUND THE HOUSE LIKE VACUUMING, SWEEPING FLOORS OR CARRYING GROCERIES: YES
CAN YOU WALK INDOORS, SUCH AS AROUND YOUR HOUSE: YES
CAN YOU PARTICIPATE IN STRENOUS SPORTS LIKE SWIMMING, SINGLES TENNIS, FOOTBALL, BASKETBALL, OR SKIING: NO
TOTAL_SCORE: 36.7
CAN YOU DO LIGHT WORK AROUND THE HOUSE LIKE DUSTING OR WASHING DISHES: YES
CAN YOU PARTICIPATE IN MODERATE RECREATIONAL ACTIVITIES LIKE GOLF, BOWLING, DANCING, DOUBLES TENNIS OR THROWING A BASEBALL OR FOOTBALL: NO
CAN YOU CLIMB A FLIGHT OF STAIRS OR WALK UP A HILL: YES
CAN YOU TAKE CARE OF YOURSELF (EAT, DRESS, BATHE, OR USE TOILET): YES
CAN YOU HAVE SEXUAL RELATIONS: YES
DASI METS SCORE: 7.3
CAN YOU WALK A BLOCK OR TWO ON LEVEL GROUND: YES
CAN YOU DO YARD WORK LIKE RAKING LEAVES, WEEDING OR PUSHING A MOWER: YES
CAN YOU DO HEAVY WORK AROUND THE HOUSE LIKE SCRUBBING FLOORS OR LIFTING AND MOVING HEAVY FURNITURE: YES

## 2023-12-28 ASSESSMENT — PAIN DESCRIPTION - DESCRIPTORS: DESCRIPTORS: CRAMPING

## 2023-12-28 ASSESSMENT — PAIN - FUNCTIONAL ASSESSMENT: PAIN_FUNCTIONAL_ASSESSMENT: 0-10

## 2023-12-28 ASSESSMENT — PAIN SCALES - GENERAL: PAINLEVEL_OUTOF10: 3

## 2023-12-28 NOTE — CPM/PAT H&P
CPM/PAT Evaluation       Name: Tom Treviño (Tom Treviño)  /Age: 1970/53 y.o.     In-Person       Chief Complaint: Diverticulitis of large intestine with abscess without bleeding     HPI    53 year old male with diverticulitis. Reports history of diverticulitis, recently admitted to hospital 12/10/23 with an abscess and discharged on antibiotics. He then was back in ER 23 where abscess was noted to be smaller, sent home on new set of antibiotics causing some nausea, last day of antibiotic today. Reports abdominal pain in left lower quadrant, mostly at night. Denies blood in stool. Scheduled for resection laparoscopy sigmoid colon and rectum 1/3/24.     Past Medical History:   Diagnosis Date    COVID-19     COVID-19    Diverticulitis     GERD (gastroesophageal reflux disease)     HTN (hypertension)     Sleep apnea        Past Surgical History:   Procedure Laterality Date    APPENDECTOMY  2015    Appendectomy    CT GUIDED PERCUTANEOUS PERITONEAL OR RETROPERITONEAL FLUID COLLECTION DRAINAGE  2023    CT GUIDED PERCUTANEOUS PERITONEAL OR RETROPERITONEAL FLUID COLLECTION DRAINAGE 2023 [facility]       Patient Sexual activity questions deferred to the physician.    Family History   Problem Relation Name Age of Onset    Heart disease Mother      No Known Problems Father      Diverticulitis Brother         Allergies   Allergen Reactions    Esomeprazole Magnesium Other     Headaches     Current Outpatient Medications   Medication Sig Dispense Refill    acetaminophen (Tylenol) 325 mg tablet Take 2 tablets (650 mg) by mouth every 4 hours if needed for fever (temperataure  greater than or equal to 38 C). (Patient taking differently: Take 2 tablets (650 mg) by mouth every 4 hours if needed for moderate pain (4 - 6) (greater than or equal to 38 C).) 30 tablet 0    cefdinir (Omnicef) 300 mg capsule Take 1 capsule (300 mg) by mouth 2 times a day for 7 days. 14 capsule 0    chlorhexidine (Peridex)  0.12 % solution Use cap to measure 15 mL.  Swish/gargle mouthwash for at least 30 seconds.  Do not swallow.  Use night before surgery after brushing teeth and morning of surgery after brushing teeth. 473 mL 0    hydroCHLOROthiazide (HYDRODiuril) 25 mg tablet Take 1 tablet (25 mg) by mouth once daily.      lansoprazole (Prevacid) 30 mg DR capsule Take 1 capsule (30 mg) by mouth once daily in the morning. Take before meals.      metroNIDAZOLE (Flagyl) 500 mg tablet Take 1 tablet (500 mg) by mouth 3 times a day for 7 days. 21 tablet 0     No current facility-administered medications for this visit.         Review of Systems   Constitutional: Negative.    HENT: Negative.     Eyes: Negative.    Respiratory: Negative.     Cardiovascular: Negative.    Gastrointestinal:  Positive for nausea. Negative for abdominal pain.   Endocrine: Negative.    Genitourinary: Negative.    Musculoskeletal: Negative.    Skin: Negative.    Allergic/Immunologic: Negative.    Neurological: Negative.    Hematological: Negative.    Psychiatric/Behavioral: Negative.         Physical Exam  Constitutional:       Appearance: Normal appearance.   HENT:      Head: Normocephalic and atraumatic.      Mouth/Throat:      Mouth: Mucous membranes are moist.   Eyes:      Extraocular Movements: Extraocular movements intact.      Pupils: Pupils are equal, round, and reactive to light.   Cardiovascular:      Rate and Rhythm: Normal rate and regular rhythm.      Heart sounds: Normal heart sounds.   Pulmonary:      Effort: Pulmonary effort is normal.      Breath sounds: Normal breath sounds.   Abdominal:      General: Bowel sounds are normal.   Musculoskeletal:         General: Normal range of motion.      Cervical back: Normal range of motion.   Skin:     General: Skin is warm and dry.   Neurological:      General: No focal deficit present.      Mental Status: He is alert and oriented to person, place, and time.   Psychiatric:         Mood and Affect: Mood  "normal.         Behavior: Behavior normal.          PAT AIRWAY:   Airway:     Mallampati::  III    Neck ROM::  Full  normal        BP (!) 133/99   Pulse 76   Temp 35.8 °C (96.4 °F) (Temporal)   Resp 16   Ht 1.88 m (6' 2\")   Wt 113 kg (248 lb 10.9 oz)   SpO2 100%   BMI 31.93 kg/m²         DASI Risk Score      Flowsheet Row Most Recent Value   DASI SCORE 36.7   METS Score (Will be calculated only when all the questions are answered) 7.3          Caprini DVT Assessment    No data to display       Modified Frailty Index    No data to display       CHADS2 Stroke Risk  Current as of 7 minutes ago        N/A 3 - 100%: High Risk   2 - 3%: Medium Risk   0 - 2%: Low Risk     Last Change: N/A          This score determines the patient's risk of having a stroke if the patient has atrial fibrillation.        This score is not applicable to this patient. Components are not calculated.          Revised Cardiac Risk Index    No data to display       Apfel Simplified Score    No data to display       Risk Analysis Index Results This Encounter    No data found in the last 1 encounters.       Stop Bang Score      Flowsheet Row Most Recent Value   Do you snore loudly? 1   Do you often feel tired or fatigued after your sleep? 1   Has anyone ever observed you stop breathing in your sleep? 1   Do you have or are you being treated for high blood pressure? 1   Recent BMI (Calculated) 31.9   Is BMI greater than 35 kg/m2? 0=No   Age older than 50 years old? 1=Yes   Is your neck circumference greater than 17 inches (Male) or 16 inches (Female)? 1   Gender - Male 1=Yes   STOP-BANG Total Score 7          ASA: 2  DASI Risk Score: 36.7  METS:7.3  CHADS2: 1  REVISED CARDIAC RISK INDEX:0.4%  STOPBAN      Assessment and Plan:     Diverticulitis of large intestine with abscess without bleeding   Plan: Resection laparoscopy sigmoid colon and rectum 1/3/24      "

## 2023-12-28 NOTE — PREPROCEDURE INSTRUCTIONS
Medication List            Accurate as of December 28, 2023  8:01 AM. Always use your most recent med list.                acetaminophen 325 mg tablet  Commonly known as: Tylenol  Take 2 tablets (650 mg) by mouth every 4 hours if needed for fever (temperataure  greater than or equal to 38 C).  Notes to patient: Take as needed.     cefdinir 300 mg capsule  Commonly known as: Omnicef  Take 1 capsule (300 mg) by mouth 2 times a day for 7 days.     chlorhexidine 0.12 % solution  Commonly known as: Peridex  Use cap to measure 15 mL.  Swish/gargle mouthwash for at least 30 seconds.  Do not swallow.  Use night before surgery after brushing teeth and morning of surgery after brushing teeth.  Notes to patient: PER INSTRUCTIONS     hydroCHLOROthiazide 25 mg tablet  Commonly known as: HYDRODiuril  Medication Adjustments for Surgery: Take morning of surgery with sip of water, no other fluids     lansoprazole 30 mg DR capsule  Commonly known as: Prevacid  Medication Adjustments for Surgery: Take morning of surgery with sip of water, no other fluids     metroNIDAZOLE 500 mg tablet  Commonly known as: Flagyl  Take 1 tablet (500 mg) by mouth 3 times a day for 7 days.                      PAT DISCHARGE INSTRUCTIONS    Please call the Same Day Surgery (SDS) Department of the hospital where your procedure will be performed after 2:00 PM the day before your surgery. If you are scheduled on a Monday, or a Tuesday following a Monday holiday, you will need to call on the last business day prior to your surgery.    [facility]  [id number] [place] [address], [address] [phone]    [address] [address] [phone]    [facility]  [address] [address]  [phone]    Please let your surgeon know if:      You develop any open sores, shingles, burning or painful urination as these may  increase your risk of an infection.   You no longer wish to have the surgery.   Any other personal circumstances change that may lead to the need to cancel or defer this surgery-such as being sick or getting admitted to any hospital within one week of your planned procedure.    Your contact details change, such as a change of address or phone number.    Starting now:     Please DO NOT drink alcohol or smoke for 24 hours before surgery. It is well known that quitting smoking can make a huge difference to your health and recovery from surgery. The longer you abstain from smoking, the better your chances of a healthy recovery. If you need help with quitting, call 1-800-QUIT-NOW to be connected to a trained counselor who will discuss the best methods to help you quit.     Before your surgery:    Please stop all supplements 7 days prior to surgery. Or as directed by your surgeon.   Please stop taking NSAID pain medicine such as Advil and Motrin 7 days before surgery.    If you develop any fever, cough, cold, rashes, cuts, scratches, scrapes, urinary symptoms or infection anywhere on your body (including teeth and gums) prior to surgery, please call your surgeon’s office as soon as possible. This may require treatment to reduce the chance of cancellation on the day of surgery.    The day before your surgery:   DIET- Do not eat any food after MIDNIGHT. May have 10 ounces of CLEAR LIQUIDS until TWO HOURS before your arrival time. This includes water, black tea or coffee (no milk ir cream), apple juice and electrolyte drinks (Gatorade). May chew gum until TWO hours before your surgery time.   Get a good night’s rest.  Use the special soap for bathing if you have been instructed to use one.    Scheduled surgery times may change and you will be notified if this occurs - please check your personal voicemail for any updates.     On the morning of surgery:   Wear comfortable, loose fitting clothes which open in the front. Please  do not wear moisturizers, creams, lotions, makeup or perfume.    Please bring with you to surgery:   Photo ID and insurance card   Current list of medicines and allergies   Pacemaker/ Defibrillator/Heart stent cards   CPAP machine and mask    Slings/ splints/ crutches   A copy of your complete advanced directive/DHPOA.    Please do NOT bring with you to surgery:   All jewelry and valuables should be left at home.   Prosthetic devices such as contact lenses, hearing aids, dentures, eyelash extensions, hairpins and body piercings must be removed prior to going in to the surgical suite.    After outpatient surgery:   A responsible adult MUST accompany you at the time of discharge and stay with you for 24 hours after your surgery. You may NOT drive yourself home after surgery.    Do not drive, operate machinery, make critical decisions or do activities that require co-ordination or balance until after a night’s sleep.   Do not drink alcoholic beverages for 24 hours.   Instructions for resuming your medications will be provided by your surgeon.    CALL YOUR DOCTOR AFTER SURGERY IF YOU HAVE:     Chills and/or a fever of 101° F or higher.    Redness, swelling, pus or drainage from your surgical wound or a bad smell from the wound.    Lightheadedness, fainting or confusion.    Persistent vomiting (throwing up) and are not able to eat or drink for 12 hours.    Three or more loose, watery bowel movements in 24 hours (diarrhea).   Difficulty or pain while urinating( after non-urological surgery)    Pain and swelling in your legs, especially if it is only on one side.    Difficulty breathing or are breathing faster than normal.    Any new concerning symptoms.     Home Preoperative Antibacterial Shower    What is a home preoperative antibacterial shower?  This shower is a way of cleaning the skin with a germ killing solution before surgery. The solution contains chlorhexidine, commonly known as CHG. CHG is a skin cleanser with  germ killing ability. Let your doctor know if you are allergic to chlorhexidine.      Why do I need to take a preoperative antibacterial shower?  Skin is not sterile. It is best to try to make your skin as free of germs as possible before surgery. Proper cleansing with a germ killing soap before surgery can lower the number of germs on your skin. This helps to reduce the risk of infection at the surgical site. Following the instructions listed below will help you prepare your skin for surgery.    How do I use the solution?      Steps: Begin using your CHG soap FIVE DAYS BEFORE your scheduled surgery on __________________________________________________.  First, wash and rinse your hair using the CHG soap. Keep CHG away soap away from ear canals and eyes.  Rinse completely, do not condition. Hair extensions should be removed.  Wash your face with your normal soap and rinse.  Apply the CHG solution to a clean wet washcloth. Turn the water off or move away from the water spray to avoid premature rinsing of the CHG soap as you are applying.  Firmly lather your entire body from neck down. Do not use on face.  Pay special attention to the areas(s) where your incision(s) will be located unless they are on your face.  Avoid scrubbing your skin too hard.  The important point is to have the CHG soap sit on your skin for 3 minutes.  DO NOT wash with regular soap after you have used the CHG soap solution.  Pat yourself dry with a clean, freshly laundered towel.  DO NOT apply powders, deodorants or lotions.  Dress in clean, freshly laundered night clothes.  Be sure to sleep with clean, freshly laundered sheets.  Be aware that CHG will cause stains on fabrics; if you wash them with bleach after use. Rinse your washcloth and other linens that have contact with CHG completely. Use only non-chlorine detergents to launder the items used.  The morning of surgery is the fifth day. Repeat the above steps and dress in clean comfortable  clothing.      Who should I call if I have any questions regarding the use of CHG soap?  Call the [facility] at [phone] if you have any questions.     Patient Information: Oral/Dental Rinse    What is oral/dental rinse?  It is a mouthwash. It is a way of cleaning the mouth with a germ killing solution before your surgery. The solution contains chlorhexidine, commonly know as CHG.  It is used inside the mouth to kill bacteria known as Staphylococcus aureus.  Let your doctor know if you are allergic to chlorhexidine.    Why do I need to use CHG oral/dental rinse?  The CHG oral/dental rinse helps to kill bacteria in your mouth known as Staphylococcus aureus. This reduces the risk of infection at the surgical site.    Using your CHG oral/dental rinse.  STEPS: use your CHG oral/dental rinse after you brush your teeth the night before (at bedtime) and the morning of your surgery. Follow the directions on your prescription label.  Use the cap on the container to measure 15ml (fill cap to fill line)  Swish (gargle if you can) the mouthwash in your mouth for at least 30 seconds, (do not swallow) spit out.  After you use your CHG rinse, do not rinse your mouth with water, drink or eat. Please refer to prescription label for the appropriate time to resume oral intake.    What side effects might I have using the CHG oral/dental rinse?  CHG rinse will stick to the plaque on the teeth. Brush and floss just before use. Teeth brushing will help avoid staining of plaque during use.    Who should I contact if I have questions about the CHG oral/dental rinse?  Please call [facility] at [phone] if you have any questions.

## 2023-12-29 ENCOUNTER — TELEPHONE (OUTPATIENT)
Dept: SURGERY | Facility: CLINIC | Age: 53
End: 2023-12-29
Payer: COMMERCIAL

## 2023-12-29 ENCOUNTER — PATIENT OUTREACH (OUTPATIENT)
Dept: PRIMARY CARE | Facility: CLINIC | Age: 53
End: 2023-12-29
Payer: COMMERCIAL

## 2023-12-29 DIAGNOSIS — K57.92 DIVERTICULITIS: Primary | ICD-10-CM

## 2023-12-29 RX ORDER — NEOMYCIN SULFATE 500 MG/1
TABLET ORAL
Qty: 9 TABLET | Refills: 0 | Status: SHIPPED | OUTPATIENT
Start: 2024-01-02 | End: 2024-01-05 | Stop reason: HOSPADM

## 2023-12-29 RX ORDER — METRONIDAZOLE 500 MG/1
500 TABLET ORAL 3 TIMES DAILY
Qty: 3 TABLET | Refills: 0 | Status: SHIPPED | OUTPATIENT
Start: 2024-01-02 | End: 2024-01-05 | Stop reason: HOSPADM

## 2023-12-29 NOTE — TELEPHONE ENCOUNTER
Spoke to pt verified by name/.  Pt calling stating he is scheduled for sigmoid colon surgery on 1/3/24.  He also stated that he just finished taking Metronidazole and Omnicef  Prescribed by another MD and he would like to know if he will have to take  The Metronidazole again for ERAS bowel prep?  Per Dr. Persaud pt will need to take the ERAS bowel prep medications including taking Metronidazole again. Pt also needs the ERAS bowel prep medications sent to his pharmacy.     LM on pt's vmail to return the call back to the office.

## 2023-12-29 NOTE — TELEPHONE ENCOUNTER
I spoke to pt verified by name/.  I relayed Dr. Persaud's  message and pt   Verbalized understanding, denies further questions.     Message sent to Dr. Persaud to order the ERAS bowel prep.

## 2023-12-30 LAB — STAPHYLOCOCCUS SPEC CULT: NORMAL

## 2024-01-02 PROBLEM — K57.32 DIVERTICULITIS LARGE INTESTINE W/O PERFORATION OR ABSCESS W/O BLEEDING: Status: ACTIVE | Noted: 2024-01-02

## 2024-01-03 ENCOUNTER — ANESTHESIA EVENT (OUTPATIENT)
Dept: OPERATING ROOM | Facility: HOSPITAL | Age: 54
DRG: 331 | End: 2024-01-03
Payer: COMMERCIAL

## 2024-01-03 ENCOUNTER — HOSPITAL ENCOUNTER (INPATIENT)
Facility: HOSPITAL | Age: 54
LOS: 2 days | Discharge: HOME | DRG: 331 | End: 2024-01-05
Attending: STUDENT IN AN ORGANIZED HEALTH CARE EDUCATION/TRAINING PROGRAM | Admitting: STUDENT IN AN ORGANIZED HEALTH CARE EDUCATION/TRAINING PROGRAM
Payer: COMMERCIAL

## 2024-01-03 ENCOUNTER — ANESTHESIA (OUTPATIENT)
Dept: OPERATING ROOM | Facility: HOSPITAL | Age: 54
DRG: 331 | End: 2024-01-03
Payer: COMMERCIAL

## 2024-01-03 DIAGNOSIS — K57.20 DIVERTICULITIS OF LARGE INTESTINE WITH ABSCESS WITHOUT BLEEDING: Primary | ICD-10-CM

## 2024-01-03 DIAGNOSIS — E87.6 HYPOKALEMIA: ICD-10-CM

## 2024-01-03 DIAGNOSIS — K57.92 DIVERTICULITIS: ICD-10-CM

## 2024-01-03 DIAGNOSIS — K57.32 DIVERTICULITIS LARGE INTESTINE W/O PERFORATION OR ABSCESS W/O BLEEDING: ICD-10-CM

## 2024-01-03 LAB
ABO GROUP (TYPE) IN BLOOD: NORMAL
ATRIAL RATE: 78 BPM
GLUCOSE BLD MANUAL STRIP-MCNC: 117 MG/DL (ref 74–99)
P AXIS: 49 DEGREES
P OFFSET: 204 MS
P ONSET: 149 MS
POTASSIUM SERPL-SCNC: 3.2 MMOL/L (ref 3.4–5.1)
PR INTERVAL: 144 MS
Q ONSET: 221 MS
QRS COUNT: 13 BEATS
QRS DURATION: 96 MS
QT INTERVAL: 384 MS
QTC CALCULATION(BAZETT): 437 MS
QTC FREDERICIA: 419 MS
R AXIS: 15 DEGREES
RH FACTOR (ANTIGEN D): NORMAL
T AXIS: 27 DEGREES
T OFFSET: 413 MS
VENTRICULAR RATE: 78 BPM

## 2024-01-03 PROCEDURE — 7100000002 HC RECOVERY ROOM TIME - EACH INCREMENTAL 1 MINUTE: Performed by: STUDENT IN AN ORGANIZED HEALTH CARE EDUCATION/TRAINING PROGRAM

## 2024-01-03 PROCEDURE — 2720000007 HC OR 272 NO HCPCS: Performed by: STUDENT IN AN ORGANIZED HEALTH CARE EDUCATION/TRAINING PROGRAM

## 2024-01-03 PROCEDURE — 2500000004 HC RX 250 GENERAL PHARMACY W/ HCPCS (ALT 636 FOR OP/ED): Performed by: STUDENT IN AN ORGANIZED HEALTH CARE EDUCATION/TRAINING PROGRAM

## 2024-01-03 PROCEDURE — 3700000001 HC GENERAL ANESTHESIA TIME - INITIAL BASE CHARGE: Performed by: STUDENT IN AN ORGANIZED HEALTH CARE EDUCATION/TRAINING PROGRAM

## 2024-01-03 PROCEDURE — 3700000002 HC GENERAL ANESTHESIA TIME - EACH INCREMENTAL 1 MINUTE: Performed by: STUDENT IN AN ORGANIZED HEALTH CARE EDUCATION/TRAINING PROGRAM

## 2024-01-03 PROCEDURE — 82947 ASSAY GLUCOSE BLOOD QUANT: CPT

## 2024-01-03 PROCEDURE — 3600000002 HC OR TIME - INITIAL BASE CHARGE - PROCEDURE LEVEL TWO: Performed by: STUDENT IN AN ORGANIZED HEALTH CARE EDUCATION/TRAINING PROGRAM

## 2024-01-03 PROCEDURE — A44204 PR LAP,SURG,COLECTOMY, PARTIAL, W/ANAST: Performed by: ANESTHESIOLOGIST ASSISTANT

## 2024-01-03 PROCEDURE — 2500000001 HC RX 250 WO HCPCS SELF ADMINISTERED DRUGS (ALT 637 FOR MEDICARE OP): Performed by: ANESTHESIOLOGY

## 2024-01-03 PROCEDURE — 1210000001 HC SEMI-PRIVATE ROOM DAILY

## 2024-01-03 PROCEDURE — 96372 THER/PROPH/DIAG INJ SC/IM: CPT | Performed by: STUDENT IN AN ORGANIZED HEALTH CARE EDUCATION/TRAINING PROGRAM

## 2024-01-03 PROCEDURE — 3600000008 HC OR TIME - EACH INCREMENTAL 1 MINUTE - PROCEDURE LEVEL THREE: Performed by: STUDENT IN AN ORGANIZED HEALTH CARE EDUCATION/TRAINING PROGRAM

## 2024-01-03 PROCEDURE — 9420000001 HC RT PATIENT EDUCATION 5 MIN

## 2024-01-03 PROCEDURE — 2500000005 HC RX 250 GENERAL PHARMACY W/O HCPCS: Performed by: ANESTHESIOLOGIST ASSISTANT

## 2024-01-03 PROCEDURE — 3600000003 HC OR TIME - INITIAL BASE CHARGE - PROCEDURE LEVEL THREE: Performed by: STUDENT IN AN ORGANIZED HEALTH CARE EDUCATION/TRAINING PROGRAM

## 2024-01-03 PROCEDURE — 44207 L COLECTOMY/COLOPROCTOSTOMY: CPT | Performed by: STUDENT IN AN ORGANIZED HEALTH CARE EDUCATION/TRAINING PROGRAM

## 2024-01-03 PROCEDURE — 2780000003 HC OR 278 NO HCPCS: Performed by: STUDENT IN AN ORGANIZED HEALTH CARE EDUCATION/TRAINING PROGRAM

## 2024-01-03 PROCEDURE — 45330 DIAGNOSTIC SIGMOIDOSCOPY: CPT | Performed by: STUDENT IN AN ORGANIZED HEALTH CARE EDUCATION/TRAINING PROGRAM

## 2024-01-03 PROCEDURE — 2500000005 HC RX 250 GENERAL PHARMACY W/O HCPCS: Performed by: STUDENT IN AN ORGANIZED HEALTH CARE EDUCATION/TRAINING PROGRAM

## 2024-01-03 PROCEDURE — 3600000007 HC OR TIME - EACH INCREMENTAL 1 MINUTE - PROCEDURE LEVEL TWO: Performed by: STUDENT IN AN ORGANIZED HEALTH CARE EDUCATION/TRAINING PROGRAM

## 2024-01-03 PROCEDURE — 2500000004 HC RX 250 GENERAL PHARMACY W/ HCPCS (ALT 636 FOR OP/ED): Performed by: NURSE ANESTHETIST, CERTIFIED REGISTERED

## 2024-01-03 PROCEDURE — 7100000001 HC RECOVERY ROOM TIME - INITIAL BASE CHARGE: Performed by: STUDENT IN AN ORGANIZED HEALTH CARE EDUCATION/TRAINING PROGRAM

## 2024-01-03 PROCEDURE — 88307 TISSUE EXAM BY PATHOLOGIST: CPT | Performed by: PATHOLOGY

## 2024-01-03 PROCEDURE — 0DTN4ZZ RESECTION OF SIGMOID COLON, PERCUTANEOUS ENDOSCOPIC APPROACH: ICD-10-PCS | Performed by: STUDENT IN AN ORGANIZED HEALTH CARE EDUCATION/TRAINING PROGRAM

## 2024-01-03 PROCEDURE — A44204 PR LAP,SURG,COLECTOMY, PARTIAL, W/ANAST: Performed by: ANESTHESIOLOGY

## 2024-01-03 PROCEDURE — 0DJD8ZZ INSPECTION OF LOWER INTESTINAL TRACT, VIA NATURAL OR ARTIFICIAL OPENING ENDOSCOPIC: ICD-10-PCS | Performed by: STUDENT IN AN ORGANIZED HEALTH CARE EDUCATION/TRAINING PROGRAM

## 2024-01-03 PROCEDURE — 88307 TISSUE EXAM BY PATHOLOGIST: CPT | Mod: TC | Performed by: STUDENT IN AN ORGANIZED HEALTH CARE EDUCATION/TRAINING PROGRAM

## 2024-01-03 PROCEDURE — 2500000004 HC RX 250 GENERAL PHARMACY W/ HCPCS (ALT 636 FOR OP/ED): Performed by: ANESTHESIOLOGIST ASSISTANT

## 2024-01-03 PROCEDURE — 44213 LAP MOBIL SPLENIC FL ADD-ON: CPT | Performed by: STUDENT IN AN ORGANIZED HEALTH CARE EDUCATION/TRAINING PROGRAM

## 2024-01-03 PROCEDURE — 36415 COLL VENOUS BLD VENIPUNCTURE: CPT | Performed by: ANESTHESIOLOGY

## 2024-01-03 PROCEDURE — 84132 ASSAY OF SERUM POTASSIUM: CPT | Performed by: ANESTHESIOLOGY

## 2024-01-03 PROCEDURE — 8E0W4CZ ROBOTIC ASSISTED PROCEDURE OF TRUNK REGION, PERCUTANEOUS ENDOSCOPIC APPROACH: ICD-10-PCS | Performed by: STUDENT IN AN ORGANIZED HEALTH CARE EDUCATION/TRAINING PROGRAM

## 2024-01-03 RX ORDER — LABETALOL HYDROCHLORIDE 5 MG/ML
INJECTION, SOLUTION INTRAVENOUS AS NEEDED
Status: DISCONTINUED | OUTPATIENT
Start: 2024-01-03 | End: 2024-01-03

## 2024-01-03 RX ORDER — TIZANIDINE 4 MG/1
4 TABLET ORAL EVERY 6 HOURS PRN
Status: DISCONTINUED | OUTPATIENT
Start: 2024-01-03 | End: 2024-01-05 | Stop reason: HOSPADM

## 2024-01-03 RX ORDER — HEPARIN SODIUM 5000 [USP'U]/ML
5000 INJECTION, SOLUTION INTRAVENOUS; SUBCUTANEOUS ONCE
Status: COMPLETED | OUTPATIENT
Start: 2024-01-03 | End: 2024-01-03

## 2024-01-03 RX ORDER — ONDANSETRON HYDROCHLORIDE 2 MG/ML
INJECTION, SOLUTION INTRAVENOUS AS NEEDED
Status: DISCONTINUED | OUTPATIENT
Start: 2024-01-03 | End: 2024-01-03

## 2024-01-03 RX ORDER — HYDRALAZINE HYDROCHLORIDE 20 MG/ML
INJECTION INTRAMUSCULAR; INTRAVENOUS AS NEEDED
Status: DISCONTINUED | OUTPATIENT
Start: 2024-01-03 | End: 2024-01-03

## 2024-01-03 RX ORDER — HYDRALAZINE HYDROCHLORIDE 20 MG/ML
10 INJECTION INTRAMUSCULAR; INTRAVENOUS EVERY 30 MIN PRN
Status: DISCONTINUED | OUTPATIENT
Start: 2024-01-03 | End: 2024-01-03 | Stop reason: HOSPADM

## 2024-01-03 RX ORDER — CEFAZOLIN SODIUM 2 G/100ML
2 INJECTION, SOLUTION INTRAVENOUS ONCE
Status: DISCONTINUED | OUTPATIENT
Start: 2024-01-03 | End: 2024-01-03 | Stop reason: SDUPTHER

## 2024-01-03 RX ORDER — ROCURONIUM BROMIDE 10 MG/ML
INJECTION, SOLUTION INTRAVENOUS AS NEEDED
Status: DISCONTINUED | OUTPATIENT
Start: 2024-01-03 | End: 2024-01-03

## 2024-01-03 RX ORDER — DEXTROSE MONOHYDRATE, SODIUM CHLORIDE, AND POTASSIUM CHLORIDE 50; 1.49; 4.5 G/1000ML; G/1000ML; G/1000ML
100 INJECTION, SOLUTION INTRAVENOUS CONTINUOUS
Status: DISCONTINUED | OUTPATIENT
Start: 2024-01-03 | End: 2024-01-04

## 2024-01-03 RX ORDER — ACETAMINOPHEN 325 MG/1
650 TABLET ORAL EVERY 6 HOURS
Status: DISCONTINUED | OUTPATIENT
Start: 2024-01-03 | End: 2024-01-05 | Stop reason: HOSPADM

## 2024-01-03 RX ORDER — ENOXAPARIN SODIUM 100 MG/ML
40 INJECTION SUBCUTANEOUS DAILY
Status: DISCONTINUED | OUTPATIENT
Start: 2024-01-04 | End: 2024-01-05 | Stop reason: HOSPADM

## 2024-01-03 RX ORDER — FENTANYL CITRATE 50 UG/ML
INJECTION, SOLUTION INTRAMUSCULAR; INTRAVENOUS AS NEEDED
Status: DISCONTINUED | OUTPATIENT
Start: 2024-01-03 | End: 2024-01-03

## 2024-01-03 RX ORDER — ONDANSETRON 4 MG/1
4 TABLET, FILM COATED ORAL EVERY 8 HOURS PRN
Status: DISCONTINUED | OUTPATIENT
Start: 2024-01-03 | End: 2024-01-05 | Stop reason: HOSPADM

## 2024-01-03 RX ORDER — LIDOCAINE HYDROCHLORIDE AND EPINEPHRINE 10; 10 MG/ML; UG/ML
INJECTION, SOLUTION INFILTRATION; PERINEURAL AS NEEDED
Status: DISCONTINUED | OUTPATIENT
Start: 2024-01-03 | End: 2024-01-03 | Stop reason: HOSPADM

## 2024-01-03 RX ORDER — MIDAZOLAM HYDROCHLORIDE 1 MG/ML
INJECTION, SOLUTION INTRAMUSCULAR; INTRAVENOUS AS NEEDED
Status: DISCONTINUED | OUTPATIENT
Start: 2024-01-03 | End: 2024-01-03

## 2024-01-03 RX ORDER — KETOROLAC TROMETHAMINE 30 MG/ML
15 INJECTION, SOLUTION INTRAMUSCULAR; INTRAVENOUS ONCE AS NEEDED
Status: DISCONTINUED | OUTPATIENT
Start: 2024-01-03 | End: 2024-01-03 | Stop reason: HOSPADM

## 2024-01-03 RX ORDER — CEFAZOLIN SODIUM 2 G/100ML
2 INJECTION, SOLUTION INTRAVENOUS ONCE
Status: COMPLETED | OUTPATIENT
Start: 2024-01-03 | End: 2024-01-03

## 2024-01-03 RX ORDER — DIPHENHYDRAMINE HYDROCHLORIDE 50 MG/ML
12.5 INJECTION INTRAMUSCULAR; INTRAVENOUS ONCE AS NEEDED
Status: DISCONTINUED | OUTPATIENT
Start: 2024-01-03 | End: 2024-01-03 | Stop reason: HOSPADM

## 2024-01-03 RX ORDER — ALBUTEROL SULFATE 0.83 MG/ML
2.5 SOLUTION RESPIRATORY (INHALATION) ONCE
Status: DISCONTINUED | OUTPATIENT
Start: 2024-01-03 | End: 2024-01-03 | Stop reason: HOSPADM

## 2024-01-03 RX ORDER — LIDOCAINE HYDROCHLORIDE 10 MG/ML
INJECTION, SOLUTION EPIDURAL; INFILTRATION; INTRACAUDAL; PERINEURAL AS NEEDED
Status: DISCONTINUED | OUTPATIENT
Start: 2024-01-03 | End: 2024-01-03

## 2024-01-03 RX ORDER — PROPOFOL 10 MG/ML
INJECTION, EMULSION INTRAVENOUS AS NEEDED
Status: DISCONTINUED | OUTPATIENT
Start: 2024-01-03 | End: 2024-01-03

## 2024-01-03 RX ORDER — SODIUM CHLORIDE, SODIUM LACTATE, POTASSIUM CHLORIDE, CALCIUM CHLORIDE 600; 310; 30; 20 MG/100ML; MG/100ML; MG/100ML; MG/100ML
10 INJECTION, SOLUTION INTRAVENOUS CONTINUOUS
Status: DISCONTINUED | OUTPATIENT
Start: 2024-01-03 | End: 2024-01-03

## 2024-01-03 RX ORDER — ACETAMINOPHEN 500 MG
1000 TABLET ORAL ONCE
Status: DISCONTINUED | OUTPATIENT
Start: 2024-01-03 | End: 2024-01-03 | Stop reason: HOSPADM

## 2024-01-03 RX ORDER — ONDANSETRON HYDROCHLORIDE 2 MG/ML
4 INJECTION, SOLUTION INTRAVENOUS EVERY 8 HOURS PRN
Status: DISCONTINUED | OUTPATIENT
Start: 2024-01-03 | End: 2024-01-05 | Stop reason: HOSPADM

## 2024-01-03 RX ORDER — METRONIDAZOLE 500 MG/100ML
INJECTION, SOLUTION INTRAVENOUS AS NEEDED
Status: DISCONTINUED | OUTPATIENT
Start: 2024-01-03 | End: 2024-01-03

## 2024-01-03 RX ORDER — ALBUTEROL SULFATE 0.83 MG/ML
2.5 SOLUTION RESPIRATORY (INHALATION) ONCE AS NEEDED
Status: DISCONTINUED | OUTPATIENT
Start: 2024-01-03 | End: 2024-01-03 | Stop reason: HOSPADM

## 2024-01-03 RX ORDER — HYDROMORPHONE HYDROCHLORIDE 2 MG/ML
INJECTION, SOLUTION INTRAMUSCULAR; INTRAVENOUS; SUBCUTANEOUS AS NEEDED
Status: DISCONTINUED | OUTPATIENT
Start: 2024-01-03 | End: 2024-01-03

## 2024-01-03 RX ORDER — METOCLOPRAMIDE 10 MG/1
10 TABLET ORAL ONCE
Status: COMPLETED | OUTPATIENT
Start: 2024-01-03 | End: 2024-01-03

## 2024-01-03 RX ORDER — FAMOTIDINE 20 MG/1
40 TABLET, FILM COATED ORAL DAILY
Status: DISCONTINUED | OUTPATIENT
Start: 2024-01-04 | End: 2024-01-05 | Stop reason: HOSPADM

## 2024-01-03 RX ORDER — METRONIDAZOLE 500 MG/100ML
500 INJECTION, SOLUTION INTRAVENOUS ONCE
Status: DISCONTINUED | OUTPATIENT
Start: 2024-01-03 | End: 2024-01-03 | Stop reason: SDUPTHER

## 2024-01-03 RX ORDER — LABETALOL HYDROCHLORIDE 5 MG/ML
10 INJECTION, SOLUTION INTRAVENOUS ONCE AS NEEDED
Status: DISCONTINUED | OUTPATIENT
Start: 2024-01-03 | End: 2024-01-03 | Stop reason: HOSPADM

## 2024-01-03 RX ORDER — METRONIDAZOLE 500 MG/100ML
500 INJECTION, SOLUTION INTRAVENOUS ONCE
Status: COMPLETED | OUTPATIENT
Start: 2024-01-03 | End: 2024-01-03

## 2024-01-03 RX ORDER — FAMOTIDINE 20 MG/1
20 TABLET, FILM COATED ORAL ONCE
Status: COMPLETED | OUTPATIENT
Start: 2024-01-03 | End: 2024-01-03

## 2024-01-03 RX ORDER — ONDANSETRON HYDROCHLORIDE 2 MG/ML
4 INJECTION, SOLUTION INTRAVENOUS ONCE AS NEEDED
Status: DISCONTINUED | OUTPATIENT
Start: 2024-01-03 | End: 2024-01-03 | Stop reason: HOSPADM

## 2024-01-03 RX ADMIN — MIDAZOLAM 2 MG: 1 INJECTION INTRAMUSCULAR; INTRAVENOUS at 08:03

## 2024-01-03 RX ADMIN — FENTANYL CITRATE 50 MCG: 50 INJECTION, SOLUTION INTRAMUSCULAR; INTRAVENOUS at 08:32

## 2024-01-03 RX ADMIN — DEXTROSE MONOHYDRATE, SODIUM CHLORIDE, AND POTASSIUM CHLORIDE 40 ML/HR: 50; 4.5; 1.49 INJECTION, SOLUTION INTRAVENOUS at 16:07

## 2024-01-03 RX ADMIN — FENTANYL CITRATE 50 MCG: 50 INJECTION, SOLUTION INTRAMUSCULAR; INTRAVENOUS at 08:41

## 2024-01-03 RX ADMIN — ROCURONIUM BROMIDE 10 MG: 10 INJECTION, SOLUTION INTRAVENOUS at 11:04

## 2024-01-03 RX ADMIN — HYDRALAZINE HYDROCHLORIDE 4 MG: 20 INJECTION, SOLUTION INTRAMUSCULAR; INTRAVENOUS at 11:50

## 2024-01-03 RX ADMIN — METOCLOPRAMIDE 10 MG: 10 TABLET ORAL at 07:43

## 2024-01-03 RX ADMIN — LIDOCAINE HYDROCHLORIDE 50 MG: 10 INJECTION, SOLUTION EPIDURAL; INFILTRATION; INTRACAUDAL; PERINEURAL at 08:06

## 2024-01-03 RX ADMIN — SODIUM CHLORIDE, SODIUM LACTATE, POTASSIUM CHLORIDE, AND CALCIUM CHLORIDE: 600; 310; 30; 20 INJECTION, SOLUTION INTRAVENOUS at 09:23

## 2024-01-03 RX ADMIN — ONDANSETRON HYDROCHLORIDE 4 MG: 2 INJECTION INTRAMUSCULAR; INTRAVENOUS at 12:17

## 2024-01-03 RX ADMIN — METRONIDAZOLE 500 MG: 500 INJECTION, SOLUTION INTRAVENOUS at 06:45

## 2024-01-03 RX ADMIN — FENTANYL CITRATE 50 MCG: 50 INJECTION, SOLUTION INTRAMUSCULAR; INTRAVENOUS at 08:52

## 2024-01-03 RX ADMIN — HYDROMORPHONE HYDROCHLORIDE 0.2 MG: 2 INJECTION, SOLUTION INTRAMUSCULAR; INTRAVENOUS; SUBCUTANEOUS at 09:12

## 2024-01-03 RX ADMIN — HYDROMORPHONE HYDROCHLORIDE 0.4 MG: 2 INJECTION, SOLUTION INTRAMUSCULAR; INTRAVENOUS; SUBCUTANEOUS at 09:17

## 2024-01-03 RX ADMIN — Medication: at 15:45

## 2024-01-03 RX ADMIN — FAMOTIDINE 20 MG: 20 TABLET ORAL at 07:43

## 2024-01-03 RX ADMIN — SODIUM CHLORIDE, SODIUM LACTATE, POTASSIUM CHLORIDE, AND CALCIUM CHLORIDE: 600; 310; 30; 20 INJECTION, SOLUTION INTRAVENOUS at 11:51

## 2024-01-03 RX ADMIN — METRONIDAZOLE 500 MG: 500 INJECTION, SOLUTION INTRAVENOUS at 07:30

## 2024-01-03 RX ADMIN — HYDROMORPHONE HYDROCHLORIDE 0.4 MG: 2 INJECTION, SOLUTION INTRAMUSCULAR; INTRAVENOUS; SUBCUTANEOUS at 11:46

## 2024-01-03 RX ADMIN — CEFAZOLIN SODIUM 2 G: 2 INJECTION, SOLUTION INTRAVENOUS at 08:13

## 2024-01-03 RX ADMIN — ROCURONIUM BROMIDE 20 MG: 10 INJECTION, SOLUTION INTRAVENOUS at 09:17

## 2024-01-03 RX ADMIN — ROCURONIUM BROMIDE 20 MG: 10 INJECTION, SOLUTION INTRAVENOUS at 08:52

## 2024-01-03 RX ADMIN — HYDRALAZINE HYDROCHLORIDE 4 MG: 20 INJECTION, SOLUTION INTRAMUSCULAR; INTRAVENOUS at 08:39

## 2024-01-03 RX ADMIN — CEFAZOLIN SODIUM 2 G: 2 INJECTION, SOLUTION INTRAVENOUS at 12:10

## 2024-01-03 RX ADMIN — ROCURONIUM BROMIDE 50 MG: 10 INJECTION, SOLUTION INTRAVENOUS at 08:07

## 2024-01-03 RX ADMIN — PROPOFOL 200 MG: 10 INJECTION, EMULSION INTRAVENOUS at 08:06

## 2024-01-03 RX ADMIN — ROCURONIUM BROMIDE 20 MG: 10 INJECTION, SOLUTION INTRAVENOUS at 09:56

## 2024-01-03 RX ADMIN — FENTANYL CITRATE 50 MCG: 50 INJECTION, SOLUTION INTRAMUSCULAR; INTRAVENOUS at 10:34

## 2024-01-03 RX ADMIN — FENTANYL CITRATE 50 MCG: 50 INJECTION, SOLUTION INTRAMUSCULAR; INTRAVENOUS at 08:06

## 2024-01-03 RX ADMIN — HYDRALAZINE HYDROCHLORIDE 4 MG: 20 INJECTION, SOLUTION INTRAMUSCULAR; INTRAVENOUS at 08:49

## 2024-01-03 RX ADMIN — HEPARIN SODIUM 5000 UNITS: 5000 INJECTION, SOLUTION INTRAVENOUS; SUBCUTANEOUS at 07:42

## 2024-01-03 RX ADMIN — SUGAMMADEX 400 MG: 100 INJECTION, SOLUTION INTRAVENOUS at 12:27

## 2024-01-03 RX ADMIN — PROPOFOL 50 MG: 10 INJECTION, EMULSION INTRAVENOUS at 09:04

## 2024-01-03 RX ADMIN — ROCURONIUM BROMIDE 10 MG: 10 INJECTION, SOLUTION INTRAVENOUS at 10:56

## 2024-01-03 RX ADMIN — SODIUM CHLORIDE, SODIUM LACTATE, POTASSIUM CHLORIDE, AND CALCIUM CHLORIDE 10 ML/HR: 600; 310; 30; 20 INJECTION, SOLUTION INTRAVENOUS at 07:44

## 2024-01-03 RX ADMIN — LABETALOL HYDROCHLORIDE 5 MG: 5 INJECTION, SOLUTION INTRAVENOUS at 09:21

## 2024-01-03 RX ADMIN — ACETAMINOPHEN 650 MG: 325 TABLET ORAL at 17:45

## 2024-01-03 SDOH — SOCIAL STABILITY: SOCIAL INSECURITY: DO YOU FEEL UNSAFE GOING BACK TO THE PLACE WHERE YOU ARE LIVING?: NO

## 2024-01-03 SDOH — ECONOMIC STABILITY: HOUSING INSECURITY
IN THE LAST 12 MONTHS, WAS THERE A TIME WHEN YOU DID NOT HAVE A STEADY PLACE TO SLEEP OR SLEPT IN A SHELTER (INCLUDING NOW)?: NO

## 2024-01-03 SDOH — SOCIAL STABILITY: SOCIAL INSECURITY: HAVE YOU HAD THOUGHTS OF HARMING ANYONE ELSE?: NO

## 2024-01-03 SDOH — SOCIAL STABILITY: SOCIAL INSECURITY: HAS ANYONE EVER THREATENED TO HURT YOUR FAMILY OR YOUR PETS?: NO

## 2024-01-03 SDOH — ECONOMIC STABILITY: INCOME INSECURITY: HOW HARD IS IT FOR YOU TO PAY FOR THE VERY BASICS LIKE FOOD, HOUSING, MEDICAL CARE, AND HEATING?: NOT HARD AT ALL

## 2024-01-03 SDOH — SOCIAL STABILITY: SOCIAL INSECURITY: DOES ANYONE TRY TO KEEP YOU FROM HAVING/CONTACTING OTHER FRIENDS OR DOING THINGS OUTSIDE YOUR HOME?: NO

## 2024-01-03 SDOH — SOCIAL STABILITY: SOCIAL INSECURITY: ARE YOU OR HAVE YOU BEEN THREATENED OR ABUSED PHYSICALLY, EMOTIONALLY, OR SEXUALLY BY ANYONE?: NO

## 2024-01-03 SDOH — SOCIAL STABILITY: SOCIAL INSECURITY: WERE YOU ABLE TO COMPLETE ALL THE BEHAVIORAL HEALTH SCREENINGS?: YES

## 2024-01-03 SDOH — ECONOMIC STABILITY: HOUSING INSECURITY: IN THE LAST 12 MONTHS, HOW MANY PLACES HAVE YOU LIVED?: 1

## 2024-01-03 SDOH — ECONOMIC STABILITY: INCOME INSECURITY: IN THE LAST 12 MONTHS, WAS THERE A TIME WHEN YOU WERE NOT ABLE TO PAY THE MORTGAGE OR RENT ON TIME?: NO

## 2024-01-03 SDOH — ECONOMIC STABILITY: TRANSPORTATION INSECURITY
IN THE PAST 12 MONTHS, HAS LACK OF TRANSPORTATION KEPT YOU FROM MEETINGS, WORK, OR FROM GETTING THINGS NEEDED FOR DAILY LIVING?: NO

## 2024-01-03 SDOH — SOCIAL STABILITY: SOCIAL INSECURITY: ABUSE: ADULT

## 2024-01-03 SDOH — SOCIAL STABILITY: SOCIAL INSECURITY: ARE THERE ANY APPARENT SIGNS OF INJURIES/BEHAVIORS THAT COULD BE RELATED TO ABUSE/NEGLECT?: NO

## 2024-01-03 SDOH — ECONOMIC STABILITY: TRANSPORTATION INSECURITY
IN THE PAST 12 MONTHS, HAS THE LACK OF TRANSPORTATION KEPT YOU FROM MEDICAL APPOINTMENTS OR FROM GETTING MEDICATIONS?: NO

## 2024-01-03 SDOH — HEALTH STABILITY: MENTAL HEALTH: CURRENT SMOKER: 0

## 2024-01-03 SDOH — SOCIAL STABILITY: SOCIAL INSECURITY: DO YOU FEEL ANYONE HAS EXPLOITED OR TAKEN ADVANTAGE OF YOU FINANCIALLY OR OF YOUR PERSONAL PROPERTY?: NO

## 2024-01-03 ASSESSMENT — LIFESTYLE VARIABLES
AUDIT-C TOTAL SCORE: 1
SKIP TO QUESTIONS 9-10: 1
HOW OFTEN DO YOU HAVE A DRINK CONTAINING ALCOHOL: MONTHLY OR LESS
HOW OFTEN DO YOU HAVE 6 OR MORE DRINKS ON ONE OCCASION: NEVER
HOW MANY STANDARD DRINKS CONTAINING ALCOHOL DO YOU HAVE ON A TYPICAL DAY: 1 OR 2
AUDIT-C TOTAL SCORE: 1

## 2024-01-03 ASSESSMENT — ACTIVITIES OF DAILY LIVING (ADL)
FEEDING YOURSELF: INDEPENDENT
ADEQUATE_TO_COMPLETE_ADL: YES
HEARING - LEFT EAR: FUNCTIONAL
BATHING: INDEPENDENT
PATIENT'S MEMORY ADEQUATE TO SAFELY COMPLETE DAILY ACTIVITIES?: YES
DRESSING YOURSELF: INDEPENDENT
JUDGMENT_ADEQUATE_SAFELY_COMPLETE_DAILY_ACTIVITIES: YES
HEARING - RIGHT EAR: FUNCTIONAL
TOILETING: NEEDS ASSISTANCE
WALKS IN HOME: INDEPENDENT
GROOMING: INDEPENDENT
LACK_OF_TRANSPORTATION: NO

## 2024-01-03 ASSESSMENT — COGNITIVE AND FUNCTIONAL STATUS - GENERAL
DRESSING REGULAR LOWER BODY CLOTHING: A LITTLE
DAILY ACTIVITIY SCORE: 23
PATIENT BASELINE BEDBOUND: NO
DRESSING REGULAR LOWER BODY CLOTHING: A LITTLE
MOBILITY SCORE: 24
PATIENT BASELINE BEDBOUND: NO
MOBILITY SCORE: 24
PATIENT BASELINE BEDBOUND: NO
PATIENT BASELINE BEDBOUND: NO
DRESSING REGULAR LOWER BODY CLOTHING: A LITTLE

## 2024-01-03 ASSESSMENT — PAIN SCALES - GENERAL
PAINLEVEL_OUTOF10: 0 - NO PAIN

## 2024-01-03 ASSESSMENT — PAIN - FUNCTIONAL ASSESSMENT
PAIN_FUNCTIONAL_ASSESSMENT: 0-10
PAIN_FUNCTIONAL_ASSESSMENT: CPOT (CRITICAL CARE PAIN OBSERVATION TOOL)
PAIN_FUNCTIONAL_ASSESSMENT: 0-10

## 2024-01-03 ASSESSMENT — COLUMBIA-SUICIDE SEVERITY RATING SCALE - C-SSRS
1. IN THE PAST MONTH, HAVE YOU WISHED YOU WERE DEAD OR WISHED YOU COULD GO TO SLEEP AND NOT WAKE UP?: NO
2. HAVE YOU ACTUALLY HAD ANY THOUGHTS OF KILLING YOURSELF?: NO
6. HAVE YOU EVER DONE ANYTHING, STARTED TO DO ANYTHING, OR PREPARED TO DO ANYTHING TO END YOUR LIFE?: NO

## 2024-01-03 NOTE — ANESTHESIA POSTPROCEDURE EVALUATION
Patient: Tom Treviño    Procedure Summary       Date: 01/03/24 Room / Location: [facility] OR 12 / [facility] OR    Anesthesia Start: 0758 Anesthesia Stop: 1241    Procedures:       Resection Robot-Assisted Sigmoid Colon and Rectum      Mobilization Intestine      Sigmoidoscopy Diagnosis:       Diverticulitis of large intestine with abscess without bleeding      (Diverticulitis of large intestine with abscess without bleeding [K57.20])    Surgeons: Ayse Persaud MD Responsible Provider: GIL Manzo    Anesthesia Type: general ASA Status: 3            Anesthesia Type: general    Vitals Value Taken Time   /73 01/03/24 1300   Temp 35.9 °C (96.6 °F) 01/03/24 1235   Pulse 81 01/03/24 1300   Resp 15 01/03/24 1300   SpO2 97 % 01/03/24 1300       Anesthesia Post Evaluation    Patient location during evaluation: PACU  Patient participation: complete - patient participated  Level of consciousness: awake  Pain management: adequate  Airway patency: patent  Cardiovascular status: acceptable  Respiratory status: acceptable  Hydration status: acceptable  Postoperative Nausea and Vomiting: none        There were no known notable events for this encounter.

## 2024-01-03 NOTE — POST-PROCEDURE NOTE
Endo nurse & tech in OR 12 @ 1122. Out of OR time: 1142.  Colonoscopy completed. Scope in at 1129. Scope out at 1132.

## 2024-01-03 NOTE — SIGNIFICANT EVENT
Report to Elham Xavier RN. All questions answered. Preparing for transition to Phase 2 on floor. Holding pt in PACU for room to be cleaned.

## 2024-01-03 NOTE — ANESTHESIA PREPROCEDURE EVALUATION
Patient: Tom Treviño    Procedure Information       Date/Time: 01/03/24 0800    Procedure: Resection Robot-Assisted Sigmoid Colon and Rectum    Location: [facility] OR 12 / [facility] OR    Surgeons: Ayse Persaud MD            Relevant Problems   No relevant active problems       Clinical information reviewed:   Tobacco  Allergies  Meds   Med Hx  Surg Hx   Fam Hx  Soc Hx        NPO Detail:  NPO/Void Status  Carbonhydrate Drink Given Prior to Surgery? : N  Date of Last Liquid: 01/03/24  Time of Last Liquid: 0500  Date of Last Solid: 01/01/24  Time of Last Solid: 1700  Last Intake Type: Clear fluids  Time of Last Void: 0500         Physical Exam    Airway  Mallampati: III  TM distance: >3 FB  Neck ROM: full     Cardiovascular   Rhythm: regular  Rate: normal     Dental - normal exam     Pulmonary   Breath sounds clear to auscultation     Abdominal   Abdomen: soft           Past Medical History:   Diagnosis Date    COVID-19     COVID-19    Diverticulitis     GERD (gastroesophageal reflux disease)     HTN (hypertension)     Sleep apnea      Past Surgical History:   Procedure Laterality Date    APPENDECTOMY  02/17/2015    Appendectomy    CT GUIDED PERCUTANEOUS PERITONEAL OR RETROPERITONEAL FLUID COLLECTION DRAINAGE  11/28/2023    CT GUIDED PERCUTANEOUS PERITONEAL OR RETROPERITONEAL FLUID COLLECTION DRAINAGE 11/28/2023 TRI CT         Anesthesia Plan    ASA 3     general     The patient is not a current smoker.    intravenous induction   Postoperative administration of opioids is intended.  Anesthetic plan and risks discussed with patient.    Plan discussed with CRNA, attending and CAA.

## 2024-01-03 NOTE — ANESTHESIA PROCEDURE NOTES
Airway  Date/Time: 1/3/2024 8:08 AM  Urgency: elective    Airway not difficult    Staffing  Performed: [facility]   Authorized by: Tera Jo DO    Performed by: GIL Manzo  Patient location during procedure: OR    Indications and Patient Condition  Indications for airway management: anesthesia  Spontaneous ventilation: present  Sedation level: deep  Preoxygenated: yes  Patient position: sniffing  Mask difficulty assessment: 2 - vent by mask + OA or adjuvant +/- NMBA    Final Airway Details  Final airway type: endotracheal airway      Successful airway: ETT  Cuffed: yes   Successful intubation technique: direct laryngoscopy  Facilitating devices/methods: intubating stylet  Endotracheal tube insertion site: oral  Blade: Radha  Blade size: #4  ETT size (mm): 8.0  Cormack-Lehane Classification: grade I - full view of glottis  Placement verified by: chest auscultation   Measured from: lips  ETT to lips (cm): 23  Number of attempts at approach: 1

## 2024-01-03 NOTE — NURSING NOTE
1235- Pt admitted to PACU from OR with anesthesia present. POC outlined. No issues. NRB @ 8L O2 in place. #20 right FA with LR infusing patent and intact. Rene to CD with stat lock to right thigh secured upon admission to PACU. Abdm round and soft with 4 port sites and low midline incision covered with dermabond D&I.     1300-Pt continues to sleep soundly. VSS. POC unchanged. Dr. Persaud @ bedside relating sx went as planned. Noted pt's wife was in the WR.     1305-Pt taking PO ice chips. Resting comfortably.     1345- Noted bed is clean and ready for pt. Transport summoned. Family (Jewell) notified.     1520-Pt transported on bed to room 419B.Status stable.

## 2024-01-03 NOTE — OP NOTE
Resection Robot-Assisted Sigmoid Colon and Rectum, Sigmoidoscopy Operative Note     Date: 1/3/2024  OR Location: [facility]    Name: Tom Treviño : 1970, Age: 53 y.o., MRN: [id number], Sex: male    Diagnosis  Pre-op Diagnosis     * Diverticulitis of large intestine with abscess without bleeding [K57.20] Post-op Diagnosis     * Diverticulitis of large intestine with abscess without bleeding [K57.20]     Procedures  Resection Robot-Assisted Sigmoid Colon and Rectum  85647 - [facility] LAPS COLECTOMY PRTL W/COLOPXTSTMY LW ANAST    Mobilization Intestine  07831 - [facility] MOBLJ SPLENIC FLXR PFRMD CONJUNCT W/PRTL COLCT    Sigmoidoscopy  42688 - [facility] SIGMOIDOSCOPY FLX DX W/COLLJ SPEC BR/WA IF PFRMD      Surgeons      * Ayse Persaud - Primary    Resident/Fellow/Other Assistant:  Surgeon(s) and Role:    Procedure Summary  Anesthesia: General  ASA: III  Anesthesia Staff: Anesthesiologist: Tera Jo DO; Brian Dinh MD  CRNA: ROWDY Rivers-CRNA  C-AA: GIL Manzo  Estimated Blood Loss: 50mL  Intra-op Medications:   Medication Name Total Dose   lidocaine-epinephrine (Xylocaine W/EPI) 1 %-1:100,000 injection 30 mL   lactated Ringer's infusion Cannot be calculated   ceFAZolin in dextrose (iso-os) (Ancef) IVPB 2 g 4 g              Anesthesia Record               Intraprocedure I/O Totals          Intake    lactated Ringer's infusion 2000.00 mL    Total Intake 2000 mL       Output    Urine 350 mL    Est. Blood Loss 75 mL    Total Output 425 mL       Net    Net Volume 1575 mL          Specimen:   ID Type Source Tests Collected by Time   1 : sigmoin colon Tissue COLON - SIGMOID RESECTION SURGICAL PATHOLOGY EXAM Ayse Persaud MD 1/3/2024 1156        Staff:   Circulator: Ethel Burris RN; Ethel Ramirez RN  Relief Circulator: Arpita Chapman RN  Scrub Person: Ethel Barrett; Lisa Lizarraga         Drains and/or Catheters:   Urethral Catheter Non-latex 16 Fr. (Active)       Findings: Firm  and inflamed distal sigmoid colon densely adherent to the anterior abdominal wall, purulent cavity entered while dissecting the colon off the wall.  Descending colon to rectal anastomosis with 2 whole donuts and a negative leak test    Indications: Tom Treviño is an 53 y.o. male who is having surgery for Diverticulitis of large intestine with abscess without bleeding [K57.20].     The patient was seen in the preoperative area. The risks, benefits, complications, treatment options, non-operative alternatives, expected recovery and outcomes were discussed with the patient. The possibilities of reaction to medication, pulmonary aspiration, injury to surrounding structures, bleeding, recurrent infection, the need for additional procedures, failure to diagnose a condition, and creating a complication requiring transfusion or operation were discussed with the patient. The patient concurred with the proposed plan, giving informed consent.  The site of surgery was properly noted/marked if necessary per policy. The patient has been actively warmed in preoperative area. Preoperative antibiotics have been ordered and given within 1 hours of incision. Venous thrombosis prophylaxis have been ordered including bilateral sequential compression devices    Procedure Details: The patient was brought back into the operating room placed on the operating table in the supine position.  General anesthesia was induced.  Rene catheter was placed, arms were tucked and the patient was transitioned into the lithotomy position using yellowfin stirrups with all bony prominences padded.  The abdomen was prepped and draped according to standard sterile fashion.  A timeout was performed.  An incision was made over the umbilicus and a Veress needle was used to gain entrance into the abdomen and the abdomen was insufflated to 15 mmHg.  An 8 mm trocar was placed through this site and the camera examined the abdomen and there is no evidence of  iatrogenic injury upon entry.  2 additional 8 mm trocars were placed 1 in the left abdomen and right upper quadrant.  A 12 mm robotic trocar was placed in the right lower quadrant.  He was airplaned left side up and in Trendelenburg and the robot was brought onto the field and docked to the patient and I went to the console for further management.  The sigmoid colon was noted to be densely adherent to the anterior abdominal wall and quite firm and inflamed.  Combination of blunt dissection and sharp dissection with the vessel sealer was used to remove the colon off of the anterior abdominal wall.  Once this was performed, the sigmoid was able to be retracted cephalad to straighten out the redundancy and mesentery.  The peritoneum was opened in the retrorectal space in a medial to lateral fashion identifying the ureter out laterally and protecting it during the dissection.  The ALBARO was then skeletonized and ligated with a clip applier and transected with the vessel sealer.  The peritoneum out laterally was then opened.  Next the splenic flexure was mobilized.  The lesser sac was entered at the distal transverse colon by elevating the omentum off of the colon.  This plane was then taken out laterally using the vessel sealer across the flexure to the white line of Toldt.  The white line of Toldt was then further opened down to the area of the sigmoid.  Once the flexure had been mobilized, a distal transection point was identified on the rectum after the tinea splayed.  The very proximal rectum was still quite inflamed secondarily from the diverticulitis so the transection point was several centimeters onto the rectum. The mesentery was cleaned posteriorly and a blue load of the stapler was brought onto the field and used to transect the rectum.  A proximal transection point was then identified in an area above the diverticulitis where there was healthy pink pliable colon.  The mesentery leading up to this area was  taken using the vessel sealer.  Now that the sigmoid colon was off the anterior abdominal wall, there was space in order to make a Pfannenstiel incision and placed the GelPort.  I scrubbed back in and made a Pfannenstiel incision after injecting local anesthesia.  This was taken down to the anterior rectus sheath which was opened horizontally and the peritoneum in the middle was grasped and entered and opened superior and inferiorly.  GelPort was then placed into the abdomen.  The anvil of the 29 EEA stapler was placed through this GelPort and into the abdomen.  I went back to the robotic console and at this point the sigmoid colon had demarcated in the area of the blood supply.  The proximal transection site was identified in the descending colon.  I used the scissors to open the sigmoid colon along the tinea in an area that was can to be resected.  The EEA anvil was placed through this and tracked up toward the healthy colon.  Scissors were then used to make a colotomy and the anvil was brought out through this hole.  A 3-0 Vicryl suture was then used to tie a pursestring around the anvil and a V-Loc was used to close the sigmoid colotomy.  A blue load of the stapler was then brought onto the field and used to transect the sigmoid colon about 1 cm distal to the anvil placement.  This completely freed out the specimen which was placed in the right side of the abdomen.  The pelvis was suctioned free of fluid.  I then back to the bedside and used a rectal sizer to identify the tract of the rectum which easily went up to the staple line.  I then placed a 29 EEA stapler into the rectum and up to the staple line.  The assistant then held the stapler and I went back to the console and grasped the anvil and brought it down into the pelvis.  I instructed the assistant on opening the stapler just anteriorly to the staple line and then  the stapler and the anvil and the assistant fired the stapler and then it was  removed.  The donuts were inspected and were noted to be complete and whole.  Endoscopy was then called into the room and a flexible sigmoidoscopy was performed.  Saline filled the pelvis and on endoscopy the anastomosis was noted to be full and complete and no evidence of bleeding.  There were no bubbles to suggest a leak.  I then went back over to the console and suctioned out all of the saline.  The robotic trocars were undocked and the robot was removed.  The patient was leveled out.  I scrubbed back into remove the specimen was then removed from the Pfannenstiel incision.  The closing tray was used and everyone changed gowns and gloves and the Pfannenstiel incision fascia was closed with an 0 PDS.  The incision at the umbilicus which was through hernia was closed with a suture passer as well as the 12 mm trocar in the right lower quadrant.  The skin was then closed with Monocryl and Dermabond.  The patient tolerated the procedure well and was transferred to PACU in stable condition.  The Rene will remain for today      Complications:  None; patient tolerated the procedure well.    Disposition: PACU - hemodynamically stable.  Condition: stable         Ayse Persaud  Phone Number: [phone]

## 2024-01-04 LAB
ANION GAP SERPL CALC-SCNC: 12 MMOL/L
BUN SERPL-MCNC: 7 MG/DL (ref 8–25)
CALCIUM SERPL-MCNC: 8.8 MG/DL (ref 8.5–10.4)
CHLORIDE SERPL-SCNC: 97 MMOL/L (ref 97–107)
CO2 SERPL-SCNC: 26 MMOL/L (ref 24–31)
CREAT SERPL-MCNC: 1 MG/DL (ref 0.4–1.6)
ERYTHROCYTE [DISTWIDTH] IN BLOOD BY AUTOMATED COUNT: 12 % (ref 11.5–14.5)
GFR SERPL CREATININE-BSD FRML MDRD: 89 ML/MIN/1.73M*2
GLUCOSE SERPL-MCNC: 114 MG/DL (ref 65–99)
HCT VFR BLD AUTO: 39.7 % (ref 41–52)
HGB BLD-MCNC: 13.5 G/DL (ref 13.5–17.5)
MCH RBC QN AUTO: 30.9 PG (ref 26–34)
MCHC RBC AUTO-ENTMCNC: 34 G/DL (ref 32–36)
MCV RBC AUTO: 91 FL (ref 80–100)
NRBC BLD-RTO: 0 /100 WBCS (ref 0–0)
PLATELET # BLD AUTO: 297 X10*3/UL (ref 150–450)
POTASSIUM SERPL-SCNC: 3.1 MMOL/L (ref 3.4–5.1)
RBC # BLD AUTO: 4.37 X10*6/UL (ref 4.5–5.9)
SODIUM SERPL-SCNC: 135 MMOL/L (ref 133–145)
WBC # BLD AUTO: 12.4 X10*3/UL (ref 4.4–11.3)

## 2024-01-04 PROCEDURE — 94760 N-INVAS EAR/PLS OXIMETRY 1: CPT

## 2024-01-04 PROCEDURE — 1210000001 HC SEMI-PRIVATE ROOM DAILY

## 2024-01-04 PROCEDURE — 99231 SBSQ HOSP IP/OBS SF/LOW 25: CPT

## 2024-01-04 PROCEDURE — 85027 COMPLETE CBC AUTOMATED: CPT | Performed by: STUDENT IN AN ORGANIZED HEALTH CARE EDUCATION/TRAINING PROGRAM

## 2024-01-04 PROCEDURE — 2500000004 HC RX 250 GENERAL PHARMACY W/ HCPCS (ALT 636 FOR OP/ED)

## 2024-01-04 PROCEDURE — 2500000005 HC RX 250 GENERAL PHARMACY W/O HCPCS: Performed by: STUDENT IN AN ORGANIZED HEALTH CARE EDUCATION/TRAINING PROGRAM

## 2024-01-04 PROCEDURE — 82374 ASSAY BLOOD CARBON DIOXIDE: CPT | Performed by: STUDENT IN AN ORGANIZED HEALTH CARE EDUCATION/TRAINING PROGRAM

## 2024-01-04 PROCEDURE — 2500000001 HC RX 250 WO HCPCS SELF ADMINISTERED DRUGS (ALT 637 FOR MEDICARE OP): Performed by: STUDENT IN AN ORGANIZED HEALTH CARE EDUCATION/TRAINING PROGRAM

## 2024-01-04 PROCEDURE — 2500000004 HC RX 250 GENERAL PHARMACY W/ HCPCS (ALT 636 FOR OP/ED): Performed by: STUDENT IN AN ORGANIZED HEALTH CARE EDUCATION/TRAINING PROGRAM

## 2024-01-04 PROCEDURE — 36415 COLL VENOUS BLD VENIPUNCTURE: CPT | Performed by: STUDENT IN AN ORGANIZED HEALTH CARE EDUCATION/TRAINING PROGRAM

## 2024-01-04 RX ORDER — POTASSIUM CHLORIDE 20 MEQ/1
40 TABLET, EXTENDED RELEASE ORAL ONCE
Status: COMPLETED | OUTPATIENT
Start: 2024-01-04 | End: 2024-01-04

## 2024-01-04 RX ORDER — KETOROLAC TROMETHAMINE 30 MG/ML
30 INJECTION, SOLUTION INTRAMUSCULAR; INTRAVENOUS EVERY 6 HOURS SCHEDULED
Status: DISCONTINUED | OUTPATIENT
Start: 2024-01-04 | End: 2024-01-05 | Stop reason: HOSPADM

## 2024-01-04 RX ORDER — KETOROLAC TROMETHAMINE 30 MG/ML
30 INJECTION, SOLUTION INTRAMUSCULAR; INTRAVENOUS EVERY 6 HOURS PRN
Status: DISCONTINUED | OUTPATIENT
Start: 2024-01-04 | End: 2024-01-04

## 2024-01-04 RX ADMIN — ACETAMINOPHEN 650 MG: 325 TABLET ORAL at 06:13

## 2024-01-04 RX ADMIN — ACETAMINOPHEN 650 MG: 325 TABLET ORAL at 13:23

## 2024-01-04 RX ADMIN — ENOXAPARIN SODIUM 40 MG: 40 INJECTION SUBCUTANEOUS at 10:57

## 2024-01-04 RX ADMIN — Medication: at 12:04

## 2024-01-04 RX ADMIN — KETOROLAC TROMETHAMINE 30 MG: 30 INJECTION, SOLUTION INTRAMUSCULAR; INTRAVENOUS at 13:23

## 2024-01-04 RX ADMIN — ONDANSETRON 4 MG: 2 INJECTION INTRAMUSCULAR; INTRAVENOUS at 06:44

## 2024-01-04 RX ADMIN — ACETAMINOPHEN 650 MG: 325 TABLET ORAL at 23:11

## 2024-01-04 RX ADMIN — KETOROLAC TROMETHAMINE 30 MG: 30 INJECTION, SOLUTION INTRAMUSCULAR; INTRAVENOUS at 23:11

## 2024-01-04 RX ADMIN — POTASSIUM CHLORIDE 40 MEQ: 1500 TABLET, EXTENDED RELEASE ORAL at 13:45

## 2024-01-04 RX ADMIN — ONDANSETRON HYDROCHLORIDE 4 MG: 4 TABLET, FILM COATED ORAL at 17:46

## 2024-01-04 RX ADMIN — FAMOTIDINE 40 MG: 20 TABLET ORAL at 10:57

## 2024-01-04 RX ADMIN — KETOROLAC TROMETHAMINE 30 MG: 30 INJECTION, SOLUTION INTRAMUSCULAR; INTRAVENOUS at 17:41

## 2024-01-04 RX ADMIN — ACETAMINOPHEN 650 MG: 325 TABLET ORAL at 17:41

## 2024-01-04 RX ADMIN — POTASSIUM CHLORIDE 40 MEQ: 1500 TABLET, EXTENDED RELEASE ORAL at 10:56

## 2024-01-04 RX ADMIN — DEXTROSE MONOHYDRATE, SODIUM CHLORIDE, AND POTASSIUM CHLORIDE 100 ML/HR: 50; 4.5; 1.49 INJECTION, SOLUTION INTRAVENOUS at 02:24

## 2024-01-04 RX ADMIN — ACETAMINOPHEN 650 MG: 325 TABLET ORAL at 00:26

## 2024-01-04 SDOH — SOCIAL STABILITY: SOCIAL INSECURITY: WITHIN THE LAST YEAR, HAVE YOU BEEN AFRAID OF YOUR PARTNER OR EX-PARTNER?: NO

## 2024-01-04 SDOH — HEALTH STABILITY: MENTAL HEALTH: HOW MANY STANDARD DRINKS CONTAINING ALCOHOL DO YOU HAVE ON A TYPICAL DAY?: 1 OR 2

## 2024-01-04 SDOH — HEALTH STABILITY: MENTAL HEALTH
STRESS IS WHEN SOMEONE FEELS TENSE, NERVOUS, ANXIOUS, OR CAN'T SLEEP AT NIGHT BECAUSE THEIR MIND IS TROUBLED. HOW STRESSED ARE YOU?: NOT AT ALL

## 2024-01-04 SDOH — ECONOMIC STABILITY: INCOME INSECURITY: IN THE PAST 12 MONTHS, HAS THE ELECTRIC, GAS, OIL, OR WATER COMPANY THREATENED TO SHUT OFF SERVICE IN YOUR HOME?: NO

## 2024-01-04 SDOH — ECONOMIC STABILITY: HOUSING INSECURITY: IN THE LAST 12 MONTHS, HOW MANY PLACES HAVE YOU LIVED?: 1

## 2024-01-04 SDOH — SOCIAL STABILITY: SOCIAL NETWORK
DO YOU BELONG TO ANY CLUBS OR ORGANIZATIONS SUCH AS CHURCH GROUPS UNIONS, FRATERNAL OR ATHLETIC GROUPS, OR SCHOOL GROUPS?: NO

## 2024-01-04 SDOH — SOCIAL STABILITY: SOCIAL NETWORK: HOW OFTEN DO YOU ATTENT MEETINGS OF THE CLUB OR ORGANIZATION YOU BELONG TO?: NEVER

## 2024-01-04 SDOH — SOCIAL STABILITY: SOCIAL NETWORK: ARE YOU MARRIED, WIDOWED, DIVORCED, SEPARATED, NEVER MARRIED, OR LIVING WITH A PARTNER?: MARRIED

## 2024-01-04 SDOH — ECONOMIC STABILITY: FOOD INSECURITY: WITHIN THE PAST 12 MONTHS, YOU WORRIED THAT YOUR FOOD WOULD RUN OUT BEFORE YOU GOT MONEY TO BUY MORE.: NEVER TRUE

## 2024-01-04 SDOH — HEALTH STABILITY: MENTAL HEALTH: HOW OFTEN DO YOU HAVE 6 OR MORE DRINKS ON ONE OCCASION?: NEVER

## 2024-01-04 SDOH — SOCIAL STABILITY: SOCIAL INSECURITY
WITHIN THE LAST YEAR, HAVE YOU BEEN KICKED, HIT, SLAPPED, OR OTHERWISE PHYSICALLY HURT BY YOUR PARTNER OR EX-PARTNER?: NO

## 2024-01-04 SDOH — ECONOMIC STABILITY: INCOME INSECURITY: IN THE LAST 12 MONTHS, WAS THERE A TIME WHEN YOU WERE NOT ABLE TO PAY THE MORTGAGE OR RENT ON TIME?: NO

## 2024-01-04 SDOH — ECONOMIC STABILITY: INCOME INSECURITY: HOW HARD IS IT FOR YOU TO PAY FOR THE VERY BASICS LIKE FOOD, HOUSING, MEDICAL CARE, AND HEATING?: NOT HARD AT ALL

## 2024-01-04 SDOH — SOCIAL STABILITY: SOCIAL INSECURITY: WITHIN THE LAST YEAR, HAVE YOU BEEN HUMILIATED OR EMOTIONALLY ABUSED IN OTHER WAYS BY YOUR PARTNER OR EX-PARTNER?: NO

## 2024-01-04 SDOH — SOCIAL STABILITY: SOCIAL INSECURITY
WITHIN THE LAST YEAR, HAVE TO BEEN RAPED OR FORCED TO HAVE ANY KIND OF SEXUAL ACTIVITY BY YOUR PARTNER OR EX-PARTNER?: NO

## 2024-01-04 SDOH — HEALTH STABILITY: MENTAL HEALTH: HOW OFTEN DO YOU HAVE A DRINK CONTAINING ALCOHOL?: MONTHLY OR LESS

## 2024-01-04 SDOH — HEALTH STABILITY: PHYSICAL HEALTH: ON AVERAGE, HOW MANY DAYS PER WEEK DO YOU ENGAGE IN MODERATE TO STRENUOUS EXERCISE (LIKE A BRISK WALK)?: 0 DAYS

## 2024-01-04 SDOH — ECONOMIC STABILITY: FOOD INSECURITY: WITHIN THE PAST 12 MONTHS, THE FOOD YOU BOUGHT JUST DIDN'T LAST AND YOU DIDN'T HAVE MONEY TO GET MORE.: NEVER TRUE

## 2024-01-04 SDOH — SOCIAL STABILITY: SOCIAL NETWORK: HOW OFTEN DO YOU ATTEND CHURCH OR RELIGIOUS SERVICES?: NEVER

## 2024-01-04 SDOH — HEALTH STABILITY: PHYSICAL HEALTH: ON AVERAGE, HOW MANY MINUTES DO YOU ENGAGE IN EXERCISE AT THIS LEVEL?: 0 MIN

## 2024-01-04 SDOH — SOCIAL STABILITY: SOCIAL NETWORK: IN A TYPICAL WEEK, HOW MANY TIMES DO YOU TALK ON THE PHONE WITH FAMILY, FRIENDS, OR NEIGHBORS?: ONCE A WEEK

## 2024-01-04 SDOH — SOCIAL STABILITY: SOCIAL NETWORK: HOW OFTEN DO YOU GET TOGETHER WITH FRIENDS OR RELATIVES?: NEVER

## 2024-01-04 ASSESSMENT — COGNITIVE AND FUNCTIONAL STATUS - GENERAL
DAILY ACTIVITIY SCORE: 24
MOBILITY SCORE: 24
MOBILITY SCORE: 24
DAILY ACTIVITIY SCORE: 24

## 2024-01-04 ASSESSMENT — LIFESTYLE VARIABLES
SKIP TO QUESTIONS 9-10: 1
AUDIT-C TOTAL SCORE: 1

## 2024-01-04 ASSESSMENT — PAIN SCALES - GENERAL
PAINLEVEL_OUTOF10: 2
PAINLEVEL_OUTOF10: 4

## 2024-01-04 ASSESSMENT — PAIN DESCRIPTION - ORIENTATION
ORIENTATION: LOWER
ORIENTATION: LOWER

## 2024-01-04 ASSESSMENT — ACTIVITIES OF DAILY LIVING (ADL): LACK_OF_TRANSPORTATION: NO

## 2024-01-04 ASSESSMENT — PAIN - FUNCTIONAL ASSESSMENT
PAIN_FUNCTIONAL_ASSESSMENT: 0-10
PAIN_FUNCTIONAL_ASSESSMENT: 0-10

## 2024-01-04 ASSESSMENT — PAIN DESCRIPTION - LOCATION
LOCATION: ABDOMEN
LOCATION: ABDOMEN

## 2024-01-04 NOTE — CARE PLAN
Pt does not have a POA or Living Will    ADOD: 2 days    Pt lives at home with his wife in a 1 story home with 1 step to enter the home. Pt is not a diabetic, he is supposed to wear cpap but he does not like it. He does not use any assistive device for ambulating. He is able to drive and perform all his own ADL's.  He forks F.T.. he wears glasses, no hearing aids.  He denies domestic violence, no to depression or anxiety  He is here for diverticulitis. No discharge needs at this time      DISCHARGE PLAN: HOME WITH WIFE

## 2024-01-04 NOTE — PROGRESS NOTES
01/04/24 1604   Discharge Planning   Living Arrangements Spouse/significant other   Support Systems Spouse/significant other   Type of Residence Private residence   Number of Stairs to Enter Residence 1   Number of Stairs Within Residence 0   Do you have animals or pets at home? No   Who is requesting discharge planning? Provider   Home or Post Acute Services None   Patient expects to be discharged to: Home   Does the patient need discharge transport arranged? No   Financial Resource Strain   How hard is it for you to pay for the very basics like food, housing, medical care, and heating? Not hard   Housing Stability   In the last 12 months, was there a time when you were not able to pay the mortgage or rent on time? N   In the last 12 months, how many places have you lived? 1   In the last 12 months, was there a time when you did not have a steady place to sleep or slept in a shelter (including now)? N   Transportation Needs   In the past 12 months, has lack of transportation kept you from medical appointments or from getting medications? no   In the past 12 months, has lack of transportation kept you from meetings, work, or from getting things needed for daily living? No   Patient Choice   Patient / Family choosing to utilize agency / facility established prior to hospitalization No

## 2024-01-04 NOTE — NURSING NOTE
Pt up ambulating in trevizo with this RN. Complains of nausea. Medicated per order. Sitting up to recliner with IS use at this time.

## 2024-01-04 NOTE — PROGRESS NOTES
01/04/24 1605   Current Planned Discharge Disposition   Current Planned Discharge Disposition Home

## 2024-01-04 NOTE — PROGRESS NOTES
01/04/24 1605   [facility] Disability Status   Are you deaf or do you have serious difficulty hearing? N   Are you blind or do you have serious difficulty seeing, even when wearing glasses? N   Because of a physical, mental, or emotional condition, do you have serious difficulty concentrating, remembering, or making decisions? (5 years old or older) N   Do you have serious difficulty walking or climbing stairs? N   Do you have serious difficulty dressing or bathing? N   Because of a physical, mental, or emotional condition, do you have serious difficulty doing errands alone such as visiting the doctor? N

## 2024-01-04 NOTE — PROGRESS NOTES
"Tom Treviño is a 53 y.o. male on day 1 of admission presenting with Diverticulitis of intestine with abscess.    Subjective   POD 1 Colon resection, sigmoidoscopy. Doing \"ok\". C/o abdominal tenderness, says the tylenol is not helping much.     Objective     Physical Exam  Constitutional:       Appearance: Normal appearance.   HENT:      Head: Normocephalic and atraumatic.   Cardiovascular:      Rate and Rhythm: Normal rate and regular rhythm.   Pulmonary:      Effort: Pulmonary effort is normal. No respiratory distress.      Breath sounds: Normal breath sounds. No wheezing, rhonchi or rales.   Abdominal:      General: Bowel sounds are normal. There is distension.      Palpations: Abdomen is soft.      Tenderness: There is abdominal tenderness.      Comments: Incision sites C/D/I   Musculoskeletal:         General: No swelling or tenderness.   Skin:     General: Skin is warm and dry.   Neurological:      Mental Status: He is alert and oriented to person, place, and time.         Last Recorded Vitals  Blood pressure 131/73, pulse 73, temperature 36.8 °C (98.2 °F), temperature source Oral, resp. rate 17, height 1.88 m (6' 2.02\"), weight 113 kg (249 lb 1.9 oz), SpO2 95 %.  Intake/Output last 3 Shifts:  I/O last 3 completed shifts:  In: 3241.3 (28.7 mL/kg) [I.V.:2400 (21.2 mL/kg); IV Piggyback:841.3]  Out: 2625 (23.2 mL/kg) [Urine:2550 (0.6 mL/kg/hr); Blood:75]  Weight: 113 kg     Relevant Results  Lab Results   Component Value Date    WBC 12.4 (H) 01/04/2024    HGB 13.5 01/04/2024    HCT 39.7 (L) 01/04/2024    MCV 91 01/04/2024     01/04/2024     Lab Results   Component Value Date    GLUCOSE 114 (H) 01/04/2024    CALCIUM 8.8 01/04/2024     01/04/2024    K 3.1 (L) 01/04/2024    CO2 26 01/04/2024    CL 97 01/04/2024    BUN 7 (L) 01/04/2024    CREATININE 1.00 01/04/2024         This patient has a urinary catheter   Reason for the urinary catheter remaining today? Urine catheter unnecessary, will be removed " today               Assessment/Plan   Principal Problem:    Diverticulitis of intestine with abscess  Active Problems:    Diverticulitis large intestine w/o perforation or abscess w/o bleeding    Diverticulitis of large intestine with abscess without bleeding    1/4/23: POD 1. Doing ok. Advance diet. Remove bermeo catheter. K+ 3.1 today, replacement ordered. Tylenol not helping much for pain, will change Toradol to scheduled dosing for 2 days.        I spent 15 minutes in the professional and overall care of this patient.      Raine Bradley, APRN-CNP

## 2024-01-04 NOTE — PROGRESS NOTES
01/04/24 1602   Physical Activity   On average, how many days per week do you engage in moderate to strenuous exercise (like a brisk walk)? 0 days   On average, how many minutes do you engage in exercise at this level? 0 min   Financial Resource Strain   How hard is it for you to pay for the very basics like food, housing, medical care, and heating? Not hard   Housing Stability   In the last 12 months, was there a time when you were not able to pay the mortgage or rent on time? N   In the last 12 months, how many places have you lived? 1   In the last 12 months, was there a time when you did not have a steady place to sleep or slept in a shelter (including now)? N   Transportation Needs   In the past 12 months, has lack of transportation kept you from medical appointments or from getting medications? no   In the past 12 months, has lack of transportation kept you from meetings, work, or from getting things needed for daily living? No   Food Insecurity   Within the past 12 months, you worried that your food would run out before you got the money to buy more. Never true   Within the past 12 months, the food you bought just didn't last and you didn't have money to get more. Never true   Stress   Do you feel stress - tense, restless, nervous, or anxious, or unable to sleep at night because your mind is troubled all the time - these days? Not at all   Social Connections   In a typical week, how many times do you talk on the phone with family, friends, or neighbors? Once a week   How often do you get together with friends or relatives? Never   How often do you attend Roman Catholic or Denominational services? Never   Do you belong to any clubs or organizations such as Roman Catholic groups, unions, fraternal or athletic groups, or school groups? No   How often do you attend meetings of the clubs or organizations you belong to? Never   Are you , , , , never , or living with a partner?    Intimate  Partner Violence   Within the last year, have you been afraid of your partner or ex-partner? No   Within the last year, have you been humiliated or emotionally abused in other ways by your partner or ex-partner? No   Within the last year, have you been kicked, hit, slapped, or otherwise physically hurt by your partner or ex-partner? No   Within the last year, have you been raped or forced to have any kind of sexual activity by your partner or ex-partner? No   Alcohol Use   Q1: How often do you have a drink containing alcohol? Monthly or l   Q2: How many drinks containing alcohol do you have on a typical day when you are drinking? 1 or 2   Q3: How often do you have six or more drinks on one occasion? Never   Utilities   In the past 12 months has the electric, gas, oil, or water company threatened to shut off services in your home? No

## 2024-01-04 NOTE — NURSING NOTE
Dr Persaud in to see pt- orders to remove bermeo and she will place orders to advance diet and toradol. Will let oncoming RN know. PCA aware of order to remove bermeo.

## 2024-01-04 NOTE — NURSING NOTE
"Pt up ambulating in trevizo w/stanby assist w/PCA. No complaints. Back to room. Pt stated that he feels as though he has acid \"resting in my upper chest\". Pt offered ginger ale. Agreed to try before antinausea medication. Feeling better after walk. HOB elevated at this time Will continue to monitor. Call light in reach.   "

## 2024-01-05 VITALS
HEIGHT: 74 IN | BODY MASS INDEX: 31.97 KG/M2 | TEMPERATURE: 98.2 F | OXYGEN SATURATION: 98 % | HEART RATE: 65 BPM | SYSTOLIC BLOOD PRESSURE: 116 MMHG | RESPIRATION RATE: 17 BRPM | WEIGHT: 249.12 LBS | DIASTOLIC BLOOD PRESSURE: 71 MMHG

## 2024-01-05 PROBLEM — K57.80 DIVERTICULITIS OF INTESTINE WITH ABSCESS: Status: RESOLVED | Noted: 2023-11-28 | Resolved: 2024-01-05

## 2024-01-05 PROBLEM — K57.32 DIVERTICULITIS LARGE INTESTINE W/O PERFORATION OR ABSCESS W/O BLEEDING: Status: RESOLVED | Noted: 2024-01-02 | Resolved: 2024-01-05

## 2024-01-05 PROBLEM — R10.9 ABDOMINAL PAIN: Status: RESOLVED | Noted: 2023-11-28 | Resolved: 2024-01-05

## 2024-01-05 PROBLEM — K57.20 DIVERTICULITIS OF LARGE INTESTINE WITH ABSCESS WITHOUT BLEEDING: Status: RESOLVED | Noted: 2024-01-03 | Resolved: 2024-01-05

## 2024-01-05 PROBLEM — K57.92 DIVERTICULITIS: Status: RESOLVED | Noted: 2023-12-11 | Resolved: 2024-01-05

## 2024-01-05 LAB
ANION GAP SERPL CALC-SCNC: 13 MMOL/L
BUN SERPL-MCNC: 10 MG/DL (ref 8–25)
CALCIUM SERPL-MCNC: 8.5 MG/DL (ref 8.5–10.4)
CHLORIDE SERPL-SCNC: 97 MMOL/L (ref 97–107)
CO2 SERPL-SCNC: 24 MMOL/L (ref 24–31)
CREAT SERPL-MCNC: 1 MG/DL (ref 0.4–1.6)
ERYTHROCYTE [DISTWIDTH] IN BLOOD BY AUTOMATED COUNT: 12.1 % (ref 11.5–14.5)
GFR SERPL CREATININE-BSD FRML MDRD: 89 ML/MIN/1.73M*2
GLUCOSE SERPL-MCNC: 98 MG/DL (ref 65–99)
HCT VFR BLD AUTO: 36.4 % (ref 41–52)
HGB BLD-MCNC: 12 G/DL (ref 13.5–17.5)
LABORATORY COMMENT REPORT: NORMAL
MCH RBC QN AUTO: 30.3 PG (ref 26–34)
MCHC RBC AUTO-ENTMCNC: 33 G/DL (ref 32–36)
MCV RBC AUTO: 92 FL (ref 80–100)
NRBC BLD-RTO: 0 /100 WBCS (ref 0–0)
PATH REPORT.FINAL DX SPEC: NORMAL
PATH REPORT.GROSS SPEC: NORMAL
PATH REPORT.RELEVANT HX SPEC: NORMAL
PATH REPORT.TOTAL CANCER: NORMAL
PLATELET # BLD AUTO: 231 X10*3/UL (ref 150–450)
POTASSIUM SERPL-SCNC: 3.2 MMOL/L (ref 3.4–5.1)
RBC # BLD AUTO: 3.96 X10*6/UL (ref 4.5–5.9)
SODIUM SERPL-SCNC: 134 MMOL/L (ref 133–145)
WBC # BLD AUTO: 9.2 X10*3/UL (ref 4.4–11.3)

## 2024-01-05 PROCEDURE — 2500000001 HC RX 250 WO HCPCS SELF ADMINISTERED DRUGS (ALT 637 FOR MEDICARE OP): Performed by: STUDENT IN AN ORGANIZED HEALTH CARE EDUCATION/TRAINING PROGRAM

## 2024-01-05 PROCEDURE — 99232 SBSQ HOSP IP/OBS MODERATE 35: CPT

## 2024-01-05 PROCEDURE — 80048 BASIC METABOLIC PNL TOTAL CA: CPT | Performed by: STUDENT IN AN ORGANIZED HEALTH CARE EDUCATION/TRAINING PROGRAM

## 2024-01-05 PROCEDURE — 36415 COLL VENOUS BLD VENIPUNCTURE: CPT | Performed by: STUDENT IN AN ORGANIZED HEALTH CARE EDUCATION/TRAINING PROGRAM

## 2024-01-05 PROCEDURE — 2500000004 HC RX 250 GENERAL PHARMACY W/ HCPCS (ALT 636 FOR OP/ED)

## 2024-01-05 PROCEDURE — 85027 COMPLETE CBC AUTOMATED: CPT | Performed by: STUDENT IN AN ORGANIZED HEALTH CARE EDUCATION/TRAINING PROGRAM

## 2024-01-05 PROCEDURE — 2500000004 HC RX 250 GENERAL PHARMACY W/ HCPCS (ALT 636 FOR OP/ED): Performed by: STUDENT IN AN ORGANIZED HEALTH CARE EDUCATION/TRAINING PROGRAM

## 2024-01-05 RX ORDER — POTASSIUM CHLORIDE 14.9 MG/ML
20 INJECTION INTRAVENOUS ONCE
Status: COMPLETED | OUTPATIENT
Start: 2024-01-05 | End: 2024-01-05

## 2024-01-05 RX ORDER — POTASSIUM CHLORIDE 20 MEQ/1
20 TABLET, EXTENDED RELEASE ORAL DAILY
Qty: 3 TABLET | Refills: 0 | Status: SHIPPED | OUTPATIENT
Start: 2024-01-05 | End: 2024-01-11 | Stop reason: ALTCHOICE

## 2024-01-05 RX ORDER — SODIUM CHLORIDE, SODIUM LACTATE, POTASSIUM CHLORIDE, CALCIUM CHLORIDE 600; 310; 30; 20 MG/100ML; MG/100ML; MG/100ML; MG/100ML
10 INJECTION, SOLUTION INTRAVENOUS CONTINUOUS
Status: DISCONTINUED | OUTPATIENT
Start: 2024-01-05 | End: 2024-01-05 | Stop reason: HOSPADM

## 2024-01-05 RX ORDER — POTASSIUM CHLORIDE 20 MEQ/1
40 TABLET, EXTENDED RELEASE ORAL ONCE
Status: COMPLETED | OUTPATIENT
Start: 2024-01-05 | End: 2024-01-05

## 2024-01-05 RX ORDER — ACETAMINOPHEN 325 MG/1
650 TABLET ORAL EVERY 6 HOURS
Qty: 56 TABLET | Refills: 0 | Status: SHIPPED | OUTPATIENT
Start: 2024-01-05

## 2024-01-05 RX ORDER — HEPARIN SODIUM 5000 [USP'U]/ML
5000 INJECTION, SOLUTION INTRAVENOUS; SUBCUTANEOUS ONCE
Status: DISCONTINUED | OUTPATIENT
Start: 2024-01-05 | End: 2024-01-05 | Stop reason: HOSPADM

## 2024-01-05 RX ORDER — KETOROLAC TROMETHAMINE 10 MG/1
10 TABLET, FILM COATED ORAL EVERY 6 HOURS PRN
Qty: 12 TABLET | Refills: 0 | Status: SHIPPED | OUTPATIENT
Start: 2024-01-05

## 2024-01-05 RX ORDER — ACETAMINOPHEN 500 MG
1000 TABLET ORAL ONCE
Status: DISCONTINUED | OUTPATIENT
Start: 2024-01-05 | End: 2024-01-05 | Stop reason: HOSPADM

## 2024-01-05 RX ADMIN — POTASSIUM CHLORIDE 20 MEQ: 200 INJECTION, SOLUTION INTRAVENOUS at 09:19

## 2024-01-05 RX ADMIN — POTASSIUM CHLORIDE 40 MEQ: 1500 TABLET, EXTENDED RELEASE ORAL at 09:19

## 2024-01-05 RX ADMIN — FAMOTIDINE 40 MG: 20 TABLET ORAL at 09:19

## 2024-01-05 RX ADMIN — KETOROLAC TROMETHAMINE 30 MG: 30 INJECTION, SOLUTION INTRAMUSCULAR; INTRAVENOUS at 11:32

## 2024-01-05 RX ADMIN — KETOROLAC TROMETHAMINE 30 MG: 30 INJECTION, SOLUTION INTRAMUSCULAR; INTRAVENOUS at 05:18

## 2024-01-05 RX ADMIN — ACETAMINOPHEN 650 MG: 325 TABLET ORAL at 11:33

## 2024-01-05 RX ADMIN — ACETAMINOPHEN 650 MG: 325 TABLET ORAL at 05:18

## 2024-01-05 RX ADMIN — ENOXAPARIN SODIUM 40 MG: 40 INJECTION SUBCUTANEOUS at 09:18

## 2024-01-05 ASSESSMENT — COGNITIVE AND FUNCTIONAL STATUS - GENERAL
DAILY ACTIVITIY SCORE: 24
MOBILITY SCORE: 24
DAILY ACTIVITIY SCORE: 24
MOBILITY SCORE: 24

## 2024-01-05 ASSESSMENT — PAIN SCALES - WONG BAKER: WONGBAKER_NUMERICALRESPONSE: NO HURT

## 2024-01-05 ASSESSMENT — PAIN SCALES - GENERAL
PAINLEVEL_OUTOF10: 0 - NO PAIN
PAINLEVEL_OUTOF10: 0 - NO PAIN

## 2024-01-05 NOTE — DISCHARGE INSTRUCTIONS
Keep incisions clean and dry.  You have dissolvable stitches and waterproof glue over top.  You can shower and let soap and water run over the incisions.  No soaking in a tub or pool until seen in clinic for follow-up.  The glue will fall off in the next 1 to 2 weeks.    Take the pain medication as prescribed.  You should take Tylenol first and if still having pain, then use the medications that were prescribed.  You can use a heat or an ice pack over the incisions for additional relief.    No lifting more than 10 pounds.  This will prevent the hernia from forming at the incisions.    Your appetite may be decreased.  Make sure you are drinking enough liquids to stay hydrated until your appetite returns to normal.  You do not need to force food if you are not feeling hungry.    Your bowel movements may be erratic and abnormal for you in the first few weeks to month after surgery.  You should be able to have bowel movements and eat without having nausea or vomiting. The number, frequency, consistency, color etc. of the stool may change until your bowels settle into their new normal.    If you have any questions, please call the clinic [phone].

## 2024-01-05 NOTE — CARE PLAN
The patient's goals for the shift include pain control    The clinical goals for the shift include No pain

## 2024-01-05 NOTE — PROGRESS NOTES
"Tom Treviño is a 53 y.o. male on day 2 of admission presenting with Diverticulitis of large intestine with abscess without bleeding.    Subjective   POD 2 Colon resection, sigmoidoscopy. Doing \"well\". States the Toradol is really helping with his pain control. He has moved his bowels 3 times. Tolerating a regular diet.     Objective     Physical Exam  Constitutional:       Appearance: Normal appearance.   HENT:      Head: Normocephalic and atraumatic.   Cardiovascular:      Rate and Rhythm: Normal rate and regular rhythm.   Pulmonary:      Effort: Pulmonary effort is normal. No respiratory distress.      Breath sounds: Normal breath sounds. No wheezing, rhonchi or rales.   Abdominal:      General: Bowel sounds are normal. There is no distension.      Palpations: Abdomen is soft.      Tenderness: There is no abdominal tenderness.      Comments: Incision sites C/D/I   Musculoskeletal:         General: No swelling or tenderness.   Skin:     General: Skin is warm and dry.   Neurological:      Mental Status: He is alert and oriented to person, place, and time.         Last Recorded Vitals  Blood pressure 123/71, pulse 69, temperature 36.6 °C (97.9 °F), temperature source Oral, resp. rate 17, height 1.88 m (6' 2.02\"), weight 113 kg (249 lb 1.9 oz), SpO2 95 %.  Intake/Output last 3 Shifts:  I/O last 3 completed shifts:  In: 806.7 (7.1 mL/kg) [IV Piggyback:806.7]  Out: 3375 (29.9 mL/kg) [Urine:3375 (0.8 mL/kg/hr)]  Weight: 113 kg     Relevant Results  Lab Results   Component Value Date    WBC 9.2 01/05/2024    HGB 12.0 (L) 01/05/2024    HCT 36.4 (L) 01/05/2024    MCV 92 01/05/2024     01/05/2024     Lab Results   Component Value Date    GLUCOSE 98 01/05/2024    CALCIUM 8.5 01/05/2024     01/05/2024    K 3.2 (L) 01/05/2024    CO2 24 01/05/2024    CL 97 01/05/2024    BUN 10 01/05/2024    CREATININE 1.00 01/05/2024       Assessment/Plan   Principal Problem:    Diverticulitis of large intestine with abscess " without bleeding  Active Problems:    Diverticulitis of intestine with abscess    Diverticulitis large intestine w/o perforation or abscess w/o bleeding    1/5/23: Doing well. Tolerating diet and moving bowels. Tolerating ambulation well. K+ was low again today. Replacement ordered. He may need to discharge home with potassium supplements for a few days until he is eating more. Otherwise, he states he feels ready to go home.     1/4/23: POD 1. Doing ok. Advance diet. Remove bermeo catheter. K+ 3.1 today, replacement ordered. Tylenol not helping much for pain, will change Toradol to scheduled dosing for 2 days.        I spent 15 minutes in the professional and overall care of this patient.      Raine Bradley, APRN-CNP

## 2024-01-05 NOTE — DISCHARGE SUMMARY
Discharge Diagnosis  Diverticulitis of large intestine with abscess without bleeding    Issues Requiring Follow-Up  Laparoscopic resection of sigmoid colon and rectum, sigmoidoscopy    Test Results Pending At Discharge  Pending Labs       No current pending labs.            Hospital Course  Patient presented for scheduled sigmoid and colon resection after recurrent episodes of diverticulitis with abscesses. He did receive courses of antibiotics during prior hospital visits; however with the recurrent episodes, he was agreeable to surgery. Post-operatively, he recovered well. He is tolerating a regular diet and is moving his bowels. He did have issues with hypokalemia in the initial post-op period, likely related to initial low dietary intake. He will be discharged home with 3 days of oral potassium replacement until he is back to his regular dietary intake. He will also be discharged with multi-modal pain medication. He has completed his course of antibiotics.     Pertinent Physical Exam At Time of Discharge  Physical Exam  Constitutional:       Appearance: Normal appearance.   HENT:      Head: Normocephalic and atraumatic.   Cardiovascular:      Rate and Rhythm: Normal rate and regular rhythm.   Pulmonary:      Effort: Pulmonary effort is normal. No respiratory distress.      Breath sounds: Normal breath sounds. No wheezing, rhonchi or rales.   Abdominal:      General: Bowel sounds are normal. There is no distension.      Palpations: Abdomen is soft.      Tenderness: There is no abdominal tenderness.      Comments: Laparoscopic incisions with surgical glue. Surgical clips present in umbilicus. All incisions C/D/I   Musculoskeletal:         General: No swelling or tenderness.   Skin:     General: Skin is warm and dry.   Neurological:      Mental Status: He is alert and oriented to person, place, and time.         Home Medications     Medication List      START taking these medications     ketorolac 10 mg tablet;  Commonly known as: Toradol; Take 1 tablet (10   mg) by mouth every 6 hours if needed for moderate pain (4 - 6).   potassium chloride CR 20 mEq ER tablet; Commonly known as: Klor-Con M20;   Take 1 tablet (20 mEq) by mouth once daily. Do not crush or chew.     CHANGE how you take these medications     acetaminophen 325 mg tablet; Commonly known as: Tylenol; Take 2 tablets   (650 mg) by mouth every 6 hours.; What changed: when to take this, reasons   to take this     CONTINUE taking these medications     hydroCHLOROthiazide 25 mg tablet; Commonly known as: HYDRODiuril   lansoprazole 30 mg DR capsule; Commonly known as: Prevacid     STOP taking these medications     cefdinir 300 mg capsule; Commonly known as: Omnicef   chlorhexidine 0.12 % solution; Commonly known as: Peridex   metroNIDAZOLE 500 mg tablet; Commonly known as: Flagyl   neomycin 500 mg tablet; Commonly known as: Mycifradin       Outpatient Follow-Up  Future Appointments   Date Time Provider Department Center   1/24/2024  8:30 AM Ayse Persaud MD [id number] [facility]   2/19/2024  4:15 PM Murphy Lenz MD [id number] [facility]       Raine Bradley, APRN-CNP

## 2024-01-05 NOTE — NURSING NOTE
Pt up ambulating in trevizo. No complaints. No other changes noted from initial shift assessment. All surgical sites to abd remains C/D/I. Will continue to monitor.

## 2024-01-08 ENCOUNTER — PATIENT OUTREACH (OUTPATIENT)
Dept: PRIMARY CARE | Facility: CLINIC | Age: 54
End: 2024-01-08
Payer: COMMERCIAL

## 2024-01-08 ENCOUNTER — DOCUMENTATION (OUTPATIENT)
Dept: PRIMARY CARE | Facility: CLINIC | Age: 54
End: 2024-01-08
Payer: COMMERCIAL

## 2024-01-10 ENCOUNTER — TELEPHONE (OUTPATIENT)
Dept: SURGERY | Facility: CLINIC | Age: 54
End: 2024-01-10
Payer: COMMERCIAL

## 2024-01-10 NOTE — TELEPHONE ENCOUNTER
"Per verbal conversation with Dr. Persaud, encourage patient to drink fluids and if possible eat small foods, \"BRAT\" diet.  Spoke with patient's wife, informed her of the information, Jewell verbalized understanding and denied any other questions or concerns at this time.  "

## 2024-01-10 NOTE — TELEPHONE ENCOUNTER
Patient is S/P Robot Assisted Sigmoid/Colon on 1/3/2024.  Patient's wife called office this morning, stating that patient has been having an excellent recovery, but this morning patient started having diarrhea, nausea, and vomiting.  Per patient's wife, patient is unable to keep anything down.  Patient is afebrile, denies any blood in diarrhea or vomit.  Patient did complete his antibiotics.  Patient's wife would like him seen tomorrow, I did schedule an appointment, and she wanted to know what else he should do, if he should go to the ED.  Please advise, patient's wife can be reached at [phone].

## 2024-01-11 ENCOUNTER — OFFICE VISIT (OUTPATIENT)
Dept: SURGERY | Facility: CLINIC | Age: 54
End: 2024-01-11
Payer: COMMERCIAL

## 2024-01-11 VITALS
HEIGHT: 74 IN | HEART RATE: 109 BPM | WEIGHT: 242 LBS | DIASTOLIC BLOOD PRESSURE: 72 MMHG | OXYGEN SATURATION: 98 % | SYSTOLIC BLOOD PRESSURE: 110 MMHG | TEMPERATURE: 98 F | BODY MASS INDEX: 31.06 KG/M2

## 2024-01-11 DIAGNOSIS — Z09 POSTOPERATIVE EXAMINATION: Primary | ICD-10-CM

## 2024-01-11 PROCEDURE — 99024 POSTOP FOLLOW-UP VISIT: CPT | Performed by: STUDENT IN AN ORGANIZED HEALTH CARE EDUCATION/TRAINING PROGRAM

## 2024-01-11 PROCEDURE — 1036F TOBACCO NON-USER: CPT | Performed by: STUDENT IN AN ORGANIZED HEALTH CARE EDUCATION/TRAINING PROGRAM

## 2024-01-11 RX ORDER — PANTOPRAZOLE SODIUM 40 MG/1
40 TABLET, DELAYED RELEASE ORAL
COMMUNITY
Start: 2023-11-29 | End: 2024-01-11 | Stop reason: ALTCHOICE

## 2024-01-11 RX ORDER — TRAMADOL HYDROCHLORIDE 50 MG/1
50 TABLET ORAL EVERY 6 HOURS PRN
COMMUNITY
Start: 2023-11-28 | End: 2024-01-11 | Stop reason: ALTCHOICE

## 2024-01-11 ASSESSMENT — ENCOUNTER SYMPTOMS
NAUSEA: 1
DIARRHEA: 1

## 2024-01-11 ASSESSMENT — PATIENT HEALTH QUESTIONNAIRE - PHQ9
SUM OF ALL RESPONSES TO PHQ9 QUESTIONS 1 AND 2: 0
2. FEELING DOWN, DEPRESSED OR HOPELESS: NOT AT ALL
1. LITTLE INTEREST OR PLEASURE IN DOING THINGS: NOT AT ALL

## 2024-01-11 NOTE — PROGRESS NOTES
Subjective   Patient ID: Tom Treviño is a 53 y.o. male who presents for Post-op (Pt s/p resection of sigmoid colon and rectum.  Pt states yesterday he got up in the morning and had watery diarrhea and nausea.  Threw up x1 in the morning.  Had this all day and evening.  Took Pepto-Bismol about 10 pm and then at 11pm.  States that it helped.  Did drink Gatorade yesterday a couple times yesterday.  Drank a protein drink this morning.    States that he has a lot of air.  Had diarrhea overnight x 1.  Then x1 this morning.  Hasn't taken the pain meds for a couple days.  Took some yesterda).    Status post sigmoidectomy for diverticulitis with abscess on 1/3/2023.  He was discharged on postop day 2 having bowel movements and tolerating a diet.  He did well all week however yesterday morning had sudden onset of nausea and diarrhea.  He try to eat breakfast but ended up throwing up his breakfast.  He did not notice any blood or bile in the emesis.  He had diarrhea most of the day which was liquidy but brown.  The diarrhea stopped yesterday evening, and he had 1 bowel movement overnight and 1 this morning.  Feeling much better today, just weak.  Denies any fevers.  No abdominal pain, has not taken pain medication in 2 days        Review of Systems   Gastrointestinal:  Positive for diarrhea and nausea.       Objective   Physical Exam  Abdominal:      General: There is no distension.      Palpations: Abdomen is soft.      Tenderness: There is no abdominal tenderness.      Comments:   Incisions well-healing with a small amount of Dermabond.         Assessment/Plan   Problem List Items Addressed This Visit             ICD-10-CM       Health Encounters    Postoperative examination - Primary Z09    53-year-old male now 1 week after robotic assisted sigmoid resection for diverticulitis with abscess.  He had been doing well for a week postop, however yesterday developed nausea and diarrhea.   has slowly been improving and only  had 1 bowel movement today.  He feels less nauseous.  He has been afebrile without any abdominal pain.  I think this is more likely related to a gastroenteritis as opposed to postsurgical issue.  I encouraged him to continue with Gatorade and liquids to stay hydrated until his appetite goes back to normal.  He will call back if he starts to get worse again.  His originally scheduled postoperative appointment is on 1/24, and will keep this for now just so we can make sure things continue to move in the right direction.         Ayse Persaud MD 01/11/24 11:07 AM

## 2024-01-24 ENCOUNTER — OFFICE VISIT (OUTPATIENT)
Dept: SURGERY | Facility: CLINIC | Age: 54
End: 2024-01-24
Payer: COMMERCIAL

## 2024-01-24 VITALS
TEMPERATURE: 97.3 F | HEIGHT: 74 IN | WEIGHT: 242 LBS | BODY MASS INDEX: 31.06 KG/M2 | SYSTOLIC BLOOD PRESSURE: 140 MMHG | HEART RATE: 71 BPM | DIASTOLIC BLOOD PRESSURE: 88 MMHG | RESPIRATION RATE: 17 BRPM

## 2024-01-24 DIAGNOSIS — Z09 POSTOPERATIVE EXAMINATION: Primary | ICD-10-CM

## 2024-01-24 PROCEDURE — 1036F TOBACCO NON-USER: CPT | Performed by: STUDENT IN AN ORGANIZED HEALTH CARE EDUCATION/TRAINING PROGRAM

## 2024-01-24 PROCEDURE — 99024 POSTOP FOLLOW-UP VISIT: CPT | Performed by: STUDENT IN AN ORGANIZED HEALTH CARE EDUCATION/TRAINING PROGRAM

## 2024-01-24 ASSESSMENT — ENCOUNTER SYMPTOMS
DIARRHEA: 0
OCCASIONAL FEELINGS OF UNSTEADINESS: 0
LOSS OF SENSATION IN FEET: 0
ABDOMINAL PAIN: 0
DEPRESSION: 0
NAUSEA: 0
BLOOD IN STOOL: 0

## 2024-01-24 ASSESSMENT — LIFESTYLE VARIABLES
HOW OFTEN DO YOU HAVE A DRINK CONTAINING ALCOHOL: MONTHLY OR LESS
AUDIT-C TOTAL SCORE: 1
SKIP TO QUESTIONS 9-10: 1
HOW OFTEN DO YOU HAVE SIX OR MORE DRINKS ON ONE OCCASION: NEVER
HOW MANY STANDARD DRINKS CONTAINING ALCOHOL DO YOU HAVE ON A TYPICAL DAY: 1 OR 2

## 2024-01-24 ASSESSMENT — PATIENT HEALTH QUESTIONNAIRE - PHQ9
1. LITTLE INTEREST OR PLEASURE IN DOING THINGS: NOT AT ALL
SUM OF ALL RESPONSES TO PHQ9 QUESTIONS 1 & 2: 0
2. FEELING DOWN, DEPRESSED OR HOPELESS: NOT AT ALL

## 2024-01-24 ASSESSMENT — PAIN SCALES - GENERAL: PAINLEVEL: 0-NO PAIN

## 2024-01-24 NOTE — PROGRESS NOTES
Subjective   Patient ID: Tom Treviño is a 53 y.o. male who presents for Follow-up (S/P Resection Laparoscopy Sigmoid Colon and Rectum 01/03/2024 /).  Status post  robotic sigmoid resection on 1/3/2024 for smoldering diverticulitis.  He was seen 1 week after surgery with sudden onset diarrhea and nausea.  This was consistent with a gastroenteritis, and he reports that resolved quickly.  Since then he has been doing well, tolerating a diet and having 1-2 bowel movements a day.  He is slowly introducing foods.        Review of Systems   Gastrointestinal:  Negative for abdominal pain, blood in stool, diarrhea and nausea.       Objective   Physical Exam  Abdominal:      General: There is no distension.      Palpations: Abdomen is soft.      Tenderness: There is no abdominal tenderness.      Comments:   Incisions well-healing, small amount of Dermabond remains at the umbilicus.       FINAL DIAGNOSIS      SIGMOID COLON, EXCISION:                Perforated diverticular disease with organizing pericolic abscess.                Tubular adenoma.                Pericolic lymph nodes, negative for malignancy.     Assessment/Plan   Problem List Items Addressed This Visit             ICD-10-CM       Health Encounters    Postoperative examination - Primary Z09       Recovering well after resection of sigmoid colon for smoldering diverticulitis.  He has been tolerating a diet and having normal bowel movements.  He can go back to a regular diet and taking his fiber supplement.  He has FMLA through February.  instructed him to avoid heavy lifting for another month or so  And then slowly resume activities.  He is due for colonoscopy next year which I encouraged him to make an appointment either with his GI or with me at that time.  He can follow-up as needed.       Ayse Persaud MD 01/24/24 8:40 AM

## 2024-02-19 ENCOUNTER — TELEPHONE (OUTPATIENT)
Dept: SURGERY | Facility: CLINIC | Age: 54
End: 2024-02-19

## 2024-02-19 ENCOUNTER — OFFICE VISIT (OUTPATIENT)
Dept: PRIMARY CARE | Facility: CLINIC | Age: 54
End: 2024-02-19
Payer: COMMERCIAL

## 2024-02-19 VITALS
BODY MASS INDEX: 31.71 KG/M2 | DIASTOLIC BLOOD PRESSURE: 76 MMHG | SYSTOLIC BLOOD PRESSURE: 136 MMHG | OXYGEN SATURATION: 98 % | RESPIRATION RATE: 18 BRPM | WEIGHT: 247 LBS | HEART RATE: 81 BPM

## 2024-02-19 DIAGNOSIS — E78.00 HYPERCHOLESTEREMIA: ICD-10-CM

## 2024-02-19 DIAGNOSIS — I10 PRIMARY HYPERTENSION: Primary | ICD-10-CM

## 2024-02-19 DIAGNOSIS — K21.9 GASTROESOPHAGEAL REFLUX DISEASE WITHOUT ESOPHAGITIS: ICD-10-CM

## 2024-02-19 PROCEDURE — 99214 OFFICE O/P EST MOD 30 MIN: CPT | Performed by: FAMILY MEDICINE

## 2024-02-19 PROCEDURE — 3078F DIAST BP <80 MM HG: CPT | Performed by: FAMILY MEDICINE

## 2024-02-19 PROCEDURE — 3075F SYST BP GE 130 - 139MM HG: CPT | Performed by: FAMILY MEDICINE

## 2024-02-19 PROCEDURE — 1036F TOBACCO NON-USER: CPT | Performed by: FAMILY MEDICINE

## 2024-02-19 RX ORDER — TIZANIDINE 4 MG/1
4 TABLET ORAL EVERY 6 HOURS PRN
COMMUNITY
Start: 2024-01-03

## 2024-02-19 ASSESSMENT — ENCOUNTER SYMPTOMS
DEPRESSION: 0
LOSS OF SENSATION IN FEET: 0
OCCASIONAL FEELINGS OF UNSTEADINESS: 0

## 2024-02-19 ASSESSMENT — PATIENT HEALTH QUESTIONNAIRE - PHQ9
2. FEELING DOWN, DEPRESSED OR HOPELESS: NOT AT ALL
1. LITTLE INTEREST OR PLEASURE IN DOING THINGS: NOT AT ALL
SUM OF ALL RESPONSES TO PHQ9 QUESTIONS 1 AND 2: 0

## 2024-02-19 ASSESSMENT — PAIN SCALES - GENERAL: PAINLEVEL: 0-NO PAIN

## 2024-02-19 NOTE — TELEPHONE ENCOUNTER
Patient called to report his short term disability company [employer] had not yet received his paperwork. This nurse re-faxed paper work at this time.

## 2024-02-19 NOTE — ASSESSMENT & PLAN NOTE
His low potassium may be due to his hydrochlorothiazide.  Will going to stop his hydrochlorothiazide and return here in1 month at which time we will check a BMP and his blood pressure.  He is to call sooner if he notices any problems such as blood pressure running high.

## 2024-02-19 NOTE — PROGRESS NOTES
Subjective   Patient ID: Tom Treviño is a 53 y.o. male who presents for Hypertension.    HPI   1.   He is  here for follow-up of hypertension.  He is on HCTZ 25 mg (increased 2 months ago).  Recent home blood pressure has been in the 130s/ 80s. Recent blood work showed low potassium of 3.2.  In retrospect looking back his labs has been running a little bit on the low side for the past 6 months or so.     2.  he is also here for follow-up of gastroesophageal reflux disease he is on lansoprazole.  He has seen Dr. Cerrato for this.     3.   He is also here for hypercholesterolemia.  He is on no medication.  Recent LDL is 113.    Review of Systems  Denies headache, blurred vision, chest pain, shortness of breath, nausea or vomiting, change in bowel habits or leg pain or swelling.    Objective   /76 (BP Location: Left arm, Patient Position: Sitting, BP Cuff Size: Large adult)   Pulse 81   Resp 18   Wt 112 kg (247 lb)   SpO2 98%   BMI 31.71 kg/m²     Physical Exam  Vitals and nurse's notes reviewed  General: no acute distress  HEENT: Normal  Neck: Supple  Lungs: Clear  Cardio: RRR w/o murmur  Extremities: No edema, no calf tenderness  Neuro: Alert and oriented with no focal deficits    Assessment/Plan   Problem List Items Addressed This Visit             ICD-10-CM       High    Hypercholesteremia E78.00     Keep off medication.  Will check lipid panel in 6 months at CPE.         Primary hypertension - Primary I10     His low potassium may be due to his hydrochlorothiazide.  Will going to stop his hydrochlorothiazide and return here in1 month at which time we will check a BMP and his blood pressure.  He is to call sooner if he notices any problems such as blood pressure running high.         Relevant Orders    Basic metabolic panel    Gastroesophageal reflux disease without esophagitis K21.9     Continue lansoprazole and following with his gastroenterologist.

## 2024-03-04 ENCOUNTER — TELEPHONE (OUTPATIENT)
Dept: SURGERY | Facility: CLINIC | Age: 54
End: 2024-03-04
Payer: COMMERCIAL

## 2024-03-04 NOTE — LETTER
March 4, 2024     Patient: Tom Treviño   YOB: 1970   Date of Visit: 3/4/2024       To Whom It May Concern:    Tom Treviño had surgery on 1/3/24. Please excuse Tom for his absence from work beginning 1/3/24 and he may return to work without restrictions on 3/11/24.     If you have any questions or concerns, please don't hesitate to call the office at [phone].         Sincerely,         Ayse Persaud MD

## 2024-03-04 NOTE — TELEPHONE ENCOUNTER
Patient had a sigmoid colon and rectum resection with Dr. Persaud 1/3/24. Patient contacted office and would have a back to work note emailed to his job to return on Monday, 3/11/24. The email address is [email or web address]. Patient also provided a fax number for [employer] of [phone]. He was not sure if the fax number was correct.

## 2024-03-20 ENCOUNTER — LAB (OUTPATIENT)
Dept: LAB | Facility: LAB | Age: 54
End: 2024-03-20
Payer: COMMERCIAL

## 2024-03-20 DIAGNOSIS — I10 PRIMARY HYPERTENSION: ICD-10-CM

## 2024-03-20 LAB
ANION GAP SERPL CALC-SCNC: 12 MMOL/L
BUN SERPL-MCNC: 17 MG/DL (ref 8–25)
CALCIUM SERPL-MCNC: 9.2 MG/DL (ref 8.5–10.4)
CHLORIDE SERPL-SCNC: 104 MMOL/L (ref 97–107)
CO2 SERPL-SCNC: 24 MMOL/L (ref 24–31)
CREAT SERPL-MCNC: 1.1 MG/DL (ref 0.4–1.6)
EGFRCR SERPLBLD CKD-EPI 2021: 79 ML/MIN/1.73M*2
GLUCOSE SERPL-MCNC: 102 MG/DL (ref 65–99)
POTASSIUM SERPL-SCNC: 4.3 MMOL/L (ref 3.4–5.1)
SODIUM SERPL-SCNC: 140 MMOL/L (ref 133–145)

## 2024-03-20 PROCEDURE — 36415 COLL VENOUS BLD VENIPUNCTURE: CPT

## 2024-03-20 PROCEDURE — 80048 BASIC METABOLIC PNL TOTAL CA: CPT

## 2024-03-21 ENCOUNTER — OFFICE VISIT (OUTPATIENT)
Dept: PRIMARY CARE | Facility: CLINIC | Age: 54
End: 2024-03-21
Payer: COMMERCIAL

## 2024-03-21 VITALS
BODY MASS INDEX: 33 KG/M2 | SYSTOLIC BLOOD PRESSURE: 138 MMHG | RESPIRATION RATE: 18 BRPM | HEART RATE: 65 BPM | OXYGEN SATURATION: 98 % | DIASTOLIC BLOOD PRESSURE: 88 MMHG | WEIGHT: 257 LBS

## 2024-03-21 DIAGNOSIS — I10 PRIMARY HYPERTENSION: Primary | ICD-10-CM

## 2024-03-21 PROBLEM — K58.9 IBS (IRRITABLE BOWEL SYNDROME): Status: ACTIVE | Noted: 2024-03-21

## 2024-03-21 PROBLEM — T14.8XXA INFECTED WOUND: Status: RESOLVED | Noted: 2024-03-21 | Resolved: 2024-03-21

## 2024-03-21 PROBLEM — L08.9 INFECTED WOUND: Status: RESOLVED | Noted: 2024-03-21 | Resolved: 2024-03-21

## 2024-03-21 PROBLEM — M47.816 SPONDYLOSIS OF LUMBAR SPINE: Status: ACTIVE | Noted: 2024-03-21

## 2024-03-21 PROBLEM — K64.8 INTERNAL HEMORRHOIDS: Status: ACTIVE | Noted: 2024-03-21

## 2024-03-21 PROBLEM — E66.9 CLASS 1 OBESITY: Status: ACTIVE | Noted: 2023-05-03

## 2024-03-21 PROBLEM — G62.9 PERIPHERAL NERVE DISEASE: Status: ACTIVE | Noted: 2023-08-21

## 2024-03-21 PROBLEM — K57.32 DIVERTICULITIS OF SIGMOID COLON: Status: ACTIVE | Noted: 2023-11-28

## 2024-03-21 PROBLEM — G47.33 OBSTRUCTIVE SLEEP APNEA: Status: ACTIVE | Noted: 2024-03-21

## 2024-03-21 PROBLEM — E66.811 CLASS 1 OBESITY: Status: ACTIVE | Noted: 2023-05-03

## 2024-03-21 PROBLEM — E86.0 DEHYDRATION: Status: RESOLVED | Noted: 2024-03-21 | Resolved: 2024-03-21

## 2024-03-21 PROBLEM — U07.1 DISEASE DUE TO SEVERE ACUTE RESPIRATORY SYNDROME CORONAVIRUS 2 (SARS-COV-2): Status: ACTIVE | Noted: 2024-03-21

## 2024-03-21 PROBLEM — R53.81 MALAISE: Status: ACTIVE | Noted: 2024-03-21

## 2024-03-21 PROBLEM — H69.90 EUSTACHIAN TUBE DYSFUNCTION: Status: ACTIVE | Noted: 2019-08-20

## 2024-03-21 PROCEDURE — 3075F SYST BP GE 130 - 139MM HG: CPT | Performed by: FAMILY MEDICINE

## 2024-03-21 PROCEDURE — 99213 OFFICE O/P EST LOW 20 MIN: CPT | Performed by: FAMILY MEDICINE

## 2024-03-21 PROCEDURE — 1036F TOBACCO NON-USER: CPT | Performed by: FAMILY MEDICINE

## 2024-03-21 PROCEDURE — 3079F DIAST BP 80-89 MM HG: CPT | Performed by: FAMILY MEDICINE

## 2024-03-21 RX ORDER — LISINOPRIL 10 MG/1
10 TABLET ORAL DAILY
Qty: 30 TABLET | Refills: 3 | Status: SHIPPED | OUTPATIENT
Start: 2024-03-21 | End: 2025-03-21

## 2024-03-21 ASSESSMENT — ENCOUNTER SYMPTOMS
DEPRESSION: 0
OCCASIONAL FEELINGS OF UNSTEADINESS: 0
LOSS OF SENSATION IN FEET: 0

## 2024-03-21 ASSESSMENT — PAIN SCALES - GENERAL: PAINLEVEL: 1

## 2024-03-21 NOTE — PROGRESS NOTES
Subjective   Patient ID: Tom Treviño is a 54 y.o. male who presents for Hypertension and Results (Review potassium lab work).    HPI   He is here to recheck blood pressure and potassium level.  He was seen last month for blood pressure check.  At that time he was on hydrochlorothiazide but was noted that he had prolonged relatively low potassium level.  It was decided that time to stop his hydrochlorothiazide and not replace it and to repeat his potassium at this time. Repeat potassium is normal but his blood pressures has been running a little high in the 140/90 range.  Review of Systems  Denies headache, blurred vision, chest pain, shortness of breath, nausea or vomiting, change in bowel habits or leg pain or swelling.    Objective   /88 (BP Location: Left arm, Patient Position: Sitting, BP Cuff Size: Large adult)   Pulse 65   Resp 18   Wt 117 kg (257 lb)   SpO2 98%   BMI 33.00 kg/m²     Physical Exam  Vitals and nurse's notes reviewed  General: no acute distress  HEENT: Normal  Neck: Supple  Lungs: Clear  Cardio: RRR w/o murmur  Extremities: No edema, no calf tenderness  Neuro: Alert and oriented with no focal deficits    Assessment/Plan   Problem List Items Addressed This Visit             ICD-10-CM       High    Primary hypertension - Primary I10     Will start lisinopril 10 daily.  Return here in 1 month.  Prior to receiving me at that time he will get a BMP.  Call sooner as needed.         Relevant Medications    lisinopril 10 mg tablet    Other Relevant Orders    Basic metabolic panel

## 2024-03-21 NOTE — ASSESSMENT & PLAN NOTE
Will start lisinopril 10 daily.  Return here in 1 month.  Prior to receiving me at that time he will get a BMP.  Call sooner as needed.

## 2024-04-23 ENCOUNTER — LAB (OUTPATIENT)
Dept: LAB | Facility: LAB | Age: 54
End: 2024-04-23
Payer: COMMERCIAL

## 2024-04-23 DIAGNOSIS — I10 PRIMARY HYPERTENSION: ICD-10-CM

## 2024-04-23 LAB
ANION GAP SERPL CALC-SCNC: 12 MMOL/L (ref 10–20)
BUN SERPL-MCNC: 14 MG/DL (ref 6–23)
CALCIUM SERPL-MCNC: 9.3 MG/DL (ref 8.6–10.3)
CHLORIDE SERPL-SCNC: 102 MMOL/L (ref 98–107)
CO2 SERPL-SCNC: 29 MMOL/L (ref 21–32)
CREAT SERPL-MCNC: 1.06 MG/DL (ref 0.5–1.3)
EGFRCR SERPLBLD CKD-EPI 2021: 82 ML/MIN/1.73M*2
GLUCOSE SERPL-MCNC: 104 MG/DL (ref 74–99)
POTASSIUM SERPL-SCNC: 4.5 MMOL/L (ref 3.5–5.3)
SODIUM SERPL-SCNC: 138 MMOL/L (ref 136–145)

## 2024-04-23 PROCEDURE — 80048 BASIC METABOLIC PNL TOTAL CA: CPT

## 2024-04-23 PROCEDURE — 36415 COLL VENOUS BLD VENIPUNCTURE: CPT

## 2024-04-25 ENCOUNTER — OFFICE VISIT (OUTPATIENT)
Dept: PRIMARY CARE | Facility: CLINIC | Age: 54
End: 2024-04-25
Payer: COMMERCIAL

## 2024-04-25 ENCOUNTER — TELEPHONE (OUTPATIENT)
Dept: PRIMARY CARE | Facility: CLINIC | Age: 54
End: 2024-04-25

## 2024-04-25 VITALS
DIASTOLIC BLOOD PRESSURE: 80 MMHG | OXYGEN SATURATION: 98 % | WEIGHT: 260 LBS | BODY MASS INDEX: 33.38 KG/M2 | RESPIRATION RATE: 16 BRPM | SYSTOLIC BLOOD PRESSURE: 130 MMHG | HEART RATE: 74 BPM

## 2024-04-25 DIAGNOSIS — Z12.5 SCREENING FOR PROSTATE CANCER: ICD-10-CM

## 2024-04-25 DIAGNOSIS — Z00.00 ANNUAL PHYSICAL EXAM: Primary | ICD-10-CM

## 2024-04-25 DIAGNOSIS — I10 PRIMARY HYPERTENSION: Primary | ICD-10-CM

## 2024-04-25 DIAGNOSIS — I10 PRIMARY HYPERTENSION: ICD-10-CM

## 2024-04-25 DIAGNOSIS — E78.00 HYPERCHOLESTEREMIA: ICD-10-CM

## 2024-04-25 PROCEDURE — 1036F TOBACCO NON-USER: CPT | Performed by: FAMILY MEDICINE

## 2024-04-25 PROCEDURE — 99213 OFFICE O/P EST LOW 20 MIN: CPT | Performed by: FAMILY MEDICINE

## 2024-04-25 PROCEDURE — 3075F SYST BP GE 130 - 139MM HG: CPT | Performed by: FAMILY MEDICINE

## 2024-04-25 PROCEDURE — 3079F DIAST BP 80-89 MM HG: CPT | Performed by: FAMILY MEDICINE

## 2024-04-25 ASSESSMENT — PAIN SCALES - GENERAL: PAINLEVEL: 0-NO PAIN

## 2024-04-25 NOTE — PROGRESS NOTES
Subjective   Patient ID: Tom Treviño is a 54 y.o. male who presents for Hypertension (Patient is here for a HTN check).    HPI   He is here for follow-up of hypertension.  He is on lisinopril.  This replaced hydrochlorothiazide which was leading to hypokalemia.  He started the lisinopril in 3/24. Home BPs have been in the 130/80 range.  I found his home BP cuff to be accurate today.  Recheck BP by me is 130/82.  Review of Systems  Denies headache, blurred vision, chest pain, shortness of breath, nausea or vomiting, change in bowel habits or leg pain or swelling.    Objective   /80 (BP Location: Left arm, Patient Position: Sitting, BP Cuff Size: Large adult)   Pulse 74   Resp 16   Wt 118 kg (260 lb)   SpO2 98%   BMI 33.38 kg/m²     Physical Exam  Vitals and nurse's notes reviewed  General: no acute distress  HEENT: Normal  Neck: Supple  Lungs: Clear  Cardio: RRR w/o murmur  Extremities: No edema, no calf tenderness  Neuro: Alert and oriented with no focal deficits    Assessment/Plan   Problem List Items Addressed This Visit             ICD-10-CM       High    Primary hypertension - Primary I10     Continue lisinopril 10 daily.  Continue cut back on salt.  Return here for 5 months for CPE and we will recheck BP at that time.  He is to continue his home BP monitor.

## 2024-04-25 NOTE — ASSESSMENT & PLAN NOTE
Continue lisinopril 10 daily.  Continue cut back on salt.  Return here for 5 months for CPE and we will recheck BP at that time.  He is to continue his home BP monitor.

## 2024-07-17 DIAGNOSIS — I10 PRIMARY HYPERTENSION: ICD-10-CM

## 2024-07-17 RX ORDER — LISINOPRIL 10 MG/1
10 TABLET ORAL DAILY
Qty: 90 TABLET | Refills: 1 | Status: SHIPPED | OUTPATIENT
Start: 2024-07-17

## 2024-11-04 ENCOUNTER — TELEPHONE (OUTPATIENT)
Dept: PRIMARY CARE | Facility: CLINIC | Age: 54
End: 2024-11-04
Payer: COMMERCIAL

## 2024-11-04 DIAGNOSIS — I10 PRIMARY HYPERTENSION: ICD-10-CM

## 2024-11-04 DIAGNOSIS — E78.00 HYPERCHOLESTEREMIA: ICD-10-CM

## 2024-11-04 DIAGNOSIS — Z12.5 SCREENING FOR PROSTATE CANCER: ICD-10-CM

## 2024-11-04 DIAGNOSIS — Z00.00 ANNUAL PHYSICAL EXAM: Primary | ICD-10-CM

## 2024-11-11 ENCOUNTER — APPOINTMENT (OUTPATIENT)
Dept: PRIMARY CARE | Facility: CLINIC | Age: 54
End: 2024-11-11
Payer: COMMERCIAL

## 2024-12-05 ENCOUNTER — APPOINTMENT (OUTPATIENT)
Dept: PRIMARY CARE | Facility: CLINIC | Age: 54
End: 2024-12-05
Payer: COMMERCIAL

## 2025-01-02 ENCOUNTER — APPOINTMENT (OUTPATIENT)
Dept: PRIMARY CARE | Facility: CLINIC | Age: 55
End: 2025-01-02
Payer: COMMERCIAL

## 2025-01-10 DIAGNOSIS — I10 PRIMARY HYPERTENSION: ICD-10-CM

## 2025-01-10 RX ORDER — LISINOPRIL 10 MG/1
10 TABLET ORAL DAILY
Qty: 90 TABLET | Refills: 1 | Status: SHIPPED | OUTPATIENT
Start: 2025-01-10

## 2025-01-24 ENCOUNTER — LAB (OUTPATIENT)
Dept: LAB | Facility: LAB | Age: 55
End: 2025-01-24
Payer: COMMERCIAL

## 2025-01-24 DIAGNOSIS — Z12.5 SCREENING FOR PROSTATE CANCER: ICD-10-CM

## 2025-01-24 DIAGNOSIS — I10 PRIMARY HYPERTENSION: ICD-10-CM

## 2025-01-24 DIAGNOSIS — Z00.00 ANNUAL PHYSICAL EXAM: ICD-10-CM

## 2025-01-24 DIAGNOSIS — E78.00 HYPERCHOLESTEREMIA: ICD-10-CM

## 2025-01-24 LAB
ALBUMIN SERPL BCP-MCNC: 4.1 G/DL (ref 3.4–5)
ALP SERPL-CCNC: 54 U/L (ref 33–120)
ALT SERPL W P-5'-P-CCNC: 37 U/L (ref 10–52)
ANION GAP SERPL CALCULATED.3IONS-SCNC: 13 MMOL/L (ref 10–20)
AST SERPL W P-5'-P-CCNC: 27 U/L (ref 9–39)
BASOPHILS # BLD AUTO: 0.03 X10*3/UL (ref 0–0.1)
BASOPHILS NFR BLD AUTO: 0.5 %
BILIRUB SERPL-MCNC: 0.7 MG/DL (ref 0–1.2)
BUN SERPL-MCNC: 15 MG/DL (ref 6–23)
CALCIUM SERPL-MCNC: 9 MG/DL (ref 8.6–10.3)
CHLORIDE SERPL-SCNC: 104 MMOL/L (ref 98–107)
CHOLEST SERPL-MCNC: 222 MG/DL (ref 0–199)
CHOLEST/HDLC SERPL: 5.4 {RATIO}
CO2 SERPL-SCNC: 25 MMOL/L (ref 21–32)
CREAT SERPL-MCNC: 1.09 MG/DL (ref 0.5–1.3)
EGFRCR SERPLBLD CKD-EPI 2021: 80 ML/MIN/1.73M*2
EOSINOPHIL # BLD AUTO: 0.1 X10*3/UL (ref 0–0.7)
EOSINOPHIL NFR BLD AUTO: 1.5 %
ERYTHROCYTE [DISTWIDTH] IN BLOOD BY AUTOMATED COUNT: 12 % (ref 11.5–14.5)
GLUCOSE SERPL-MCNC: 93 MG/DL (ref 74–99)
HCT VFR BLD AUTO: 45.8 % (ref 41–52)
HDLC SERPL-MCNC: 41.3 MG/DL
HGB BLD-MCNC: 15.6 G/DL (ref 13.5–17.5)
IMM GRANULOCYTES # BLD AUTO: 0.02 X10*3/UL (ref 0–0.7)
IMM GRANULOCYTES NFR BLD AUTO: 0.3 % (ref 0–0.9)
LDLC SERPL CALC-MCNC: 110 MG/DL
LYMPHOCYTES # BLD AUTO: 2.5 X10*3/UL (ref 1.2–4.8)
LYMPHOCYTES NFR BLD AUTO: 38.2 %
MCH RBC QN AUTO: 32.7 PG (ref 26–34)
MCHC RBC AUTO-ENTMCNC: 34.1 G/DL (ref 32–36)
MCV RBC AUTO: 96 FL (ref 80–100)
MONOCYTES # BLD AUTO: 0.53 X10*3/UL (ref 0.1–1)
MONOCYTES NFR BLD AUTO: 8.1 %
NEUTROPHILS # BLD AUTO: 3.36 X10*3/UL (ref 1.2–7.7)
NEUTROPHILS NFR BLD AUTO: 51.4 %
NON HDL CHOLESTEROL: 181 MG/DL (ref 0–149)
NRBC BLD-RTO: 0 /100 WBCS (ref 0–0)
PLATELET # BLD AUTO: 197 X10*3/UL (ref 150–450)
POTASSIUM SERPL-SCNC: 4.3 MMOL/L (ref 3.5–5.3)
PROT SERPL-MCNC: 7.2 G/DL (ref 6.4–8.2)
PSA SERPL-MCNC: 0.9 NG/ML
RBC # BLD AUTO: 4.77 X10*6/UL (ref 4.5–5.9)
SODIUM SERPL-SCNC: 138 MMOL/L (ref 136–145)
TRIGL SERPL-MCNC: 355 MG/DL (ref 0–149)
VLDL: 71 MG/DL (ref 0–40)
WBC # BLD AUTO: 6.5 X10*3/UL (ref 4.4–11.3)

## 2025-01-24 PROCEDURE — 84153 ASSAY OF PSA TOTAL: CPT

## 2025-01-24 PROCEDURE — 80061 LIPID PANEL: CPT

## 2025-01-24 PROCEDURE — 80053 COMPREHEN METABOLIC PANEL: CPT

## 2025-01-24 PROCEDURE — 85025 COMPLETE CBC W/AUTO DIFF WBC: CPT

## 2025-01-30 ENCOUNTER — APPOINTMENT (OUTPATIENT)
Dept: PRIMARY CARE | Facility: CLINIC | Age: 55
End: 2025-01-30
Payer: COMMERCIAL

## 2025-01-30 VITALS
SYSTOLIC BLOOD PRESSURE: 118 MMHG | HEART RATE: 69 BPM | BODY MASS INDEX: 35.18 KG/M2 | WEIGHT: 274 LBS | DIASTOLIC BLOOD PRESSURE: 80 MMHG | OXYGEN SATURATION: 97 % | RESPIRATION RATE: 18 BRPM

## 2025-01-30 DIAGNOSIS — E78.00 HYPERCHOLESTEREMIA: ICD-10-CM

## 2025-01-30 DIAGNOSIS — K21.9 GASTROESOPHAGEAL REFLUX DISEASE WITHOUT ESOPHAGITIS: Primary | ICD-10-CM

## 2025-01-30 DIAGNOSIS — I10 PRIMARY HYPERTENSION: ICD-10-CM

## 2025-01-30 PROCEDURE — 3074F SYST BP LT 130 MM HG: CPT | Performed by: FAMILY MEDICINE

## 2025-01-30 PROCEDURE — 99214 OFFICE O/P EST MOD 30 MIN: CPT | Performed by: FAMILY MEDICINE

## 2025-01-30 PROCEDURE — 1036F TOBACCO NON-USER: CPT | Performed by: FAMILY MEDICINE

## 2025-01-30 PROCEDURE — 3079F DIAST BP 80-89 MM HG: CPT | Performed by: FAMILY MEDICINE

## 2025-01-30 ASSESSMENT — PAIN SCALES - GENERAL: PAINLEVEL_OUTOF10: 0-NO PAIN

## 2025-01-30 NOTE — ASSESSMENT & PLAN NOTE
Keep off medication.  Improve diet by cutting back on fats in diet.  Increase exercise.  Recheck in 6 months at CPE.

## 2025-01-30 NOTE — PROGRESS NOTES
Subjective   Patient ID: Tom Treviño is a 54 y.o. male who presents for Hypertension (Patient is here for a HTN check ).    HPI   1. He is here for follow-up of hypertension.  He is on lisinopril.  This replaced hydrochlorothiazide which was leading to hypokalemia.  He started the lisinopril in 3/24. Home BPs have been in the 130/80 range.  Repeat BP by me is 128/78.    2.  he is also here for follow-up of gastroesophageal reflux disease he is on lansoprazole.  He has seen Dr. Cerrato for this.     3.   He is also here for hypercholesterolemia.  He is on no medication.  Recent LDL is 110.    Review of Systems  Denies headache, blurred vision, chest pain, shortness of breath, nausea or vomiting, change in bowel habits or leg pain or swelling.    Objective   /80 (BP Location: Left arm, Patient Position: Sitting, BP Cuff Size: Large adult)   Pulse 69   Resp 18   Wt 124 kg (274 lb)   SpO2 97%   BMI 35.18 kg/m²     Physical Exam  Vitals and nurse's notes reviewed  General: no acute distress  HEENT: Normal  Neck: Supple  Lungs: Clear  Cardio: RRR w/o murmur  Extremities: No edema, no calf tenderness  Neuro: Alert and oriented with no focal deficits    Assessment/Plan   Problem List Items Addressed This Visit             ICD-10-CM       High    Hypercholesteremia E78.00     Keep off medication.  Improve diet by cutting back on fats in diet.  Increase exercise.  Recheck in 6 months at CPE.         Primary hypertension I10     Continue lisinopril.  Cut back on salt in diet increase exercise.  Recheck in 6 months at CPE.         Gastroesophageal reflux disease without esophagitis - Primary K21.9     Continue lansoprazole.  Recheck in 6 months at CPE.

## 2025-06-05 LAB
ALBUMIN SERPL-MCNC: 4.1 G/DL (ref 3.6–5.1)
ALP SERPL-CCNC: 58 U/L (ref 35–144)
ALT SERPL-CCNC: 33 U/L (ref 9–46)
ANION GAP SERPL CALCULATED.4IONS-SCNC: 8 MMOL/L (CALC) (ref 7–17)
AST SERPL-CCNC: 24 U/L (ref 10–35)
BASOPHILS # BLD AUTO: 18 CELLS/UL (ref 0–200)
BASOPHILS NFR BLD AUTO: 0.3 %
BILIRUB SERPL-MCNC: 0.6 MG/DL (ref 0.2–1.2)
BUN SERPL-MCNC: 16 MG/DL (ref 7–25)
CALCIUM SERPL-MCNC: 8.9 MG/DL (ref 8.6–10.3)
CHLORIDE SERPL-SCNC: 104 MMOL/L (ref 98–110)
CHOLEST SERPL-MCNC: 232 MG/DL
CHOLEST/HDLC SERPL: 5 (CALC)
CO2 SERPL-SCNC: 26 MMOL/L (ref 20–32)
CREAT SERPL-MCNC: 1.01 MG/DL (ref 0.7–1.3)
EGFRCR SERPLBLD CKD-EPI 2021: 87 ML/MIN/1.73M2
EOSINOPHIL # BLD AUTO: 92 CELLS/UL (ref 15–500)
EOSINOPHIL NFR BLD AUTO: 1.5 %
ERYTHROCYTE [DISTWIDTH] IN BLOOD BY AUTOMATED COUNT: 12 % (ref 11–15)
GLUCOSE SERPL-MCNC: 105 MG/DL (ref 65–99)
HCT VFR BLD AUTO: 46.3 % (ref 38.5–50)
HDLC SERPL-MCNC: 46 MG/DL
HGB BLD-MCNC: 15.4 G/DL (ref 13.2–17.1)
LDLC SERPL CALC-MCNC: 142 MG/DL (CALC)
LYMPHOCYTES # BLD AUTO: 2306 CELLS/UL (ref 850–3900)
LYMPHOCYTES NFR BLD AUTO: 37.8 %
MCH RBC QN AUTO: 32.4 PG (ref 27–33)
MCHC RBC AUTO-ENTMCNC: 33.3 G/DL (ref 32–36)
MCV RBC AUTO: 97.3 FL (ref 80–100)
MONOCYTES # BLD AUTO: 555 CELLS/UL (ref 200–950)
MONOCYTES NFR BLD AUTO: 9.1 %
NEUTROPHILS # BLD AUTO: 3129 CELLS/UL (ref 1500–7800)
NEUTROPHILS NFR BLD AUTO: 51.3 %
NONHDLC SERPL-MCNC: 186 MG/DL (CALC)
PLATELET # BLD AUTO: 196 THOUSAND/UL (ref 140–400)
PMV BLD REES-ECKER: 10.1 FL (ref 7.5–12.5)
POTASSIUM SERPL-SCNC: 4.6 MMOL/L (ref 3.5–5.3)
PROT SERPL-MCNC: 7.3 G/DL (ref 6.1–8.1)
PSA SERPL-MCNC: 0.99 NG/ML
RBC # BLD AUTO: 4.76 MILLION/UL (ref 4.2–5.8)
SODIUM SERPL-SCNC: 138 MMOL/L (ref 135–146)
TRIGL SERPL-MCNC: 287 MG/DL
WBC # BLD AUTO: 6.1 THOUSAND/UL (ref 3.8–10.8)

## 2025-06-12 ENCOUNTER — TELEPHONE (OUTPATIENT)
Dept: PRIMARY CARE | Facility: CLINIC | Age: 55
End: 2025-06-12

## 2025-06-12 ENCOUNTER — APPOINTMENT (OUTPATIENT)
Dept: PRIMARY CARE | Facility: CLINIC | Age: 55
End: 2025-06-12
Payer: COMMERCIAL

## 2025-06-12 VITALS
RESPIRATION RATE: 18 BRPM | OXYGEN SATURATION: 97 % | BODY MASS INDEX: 34.65 KG/M2 | SYSTOLIC BLOOD PRESSURE: 136 MMHG | WEIGHT: 270 LBS | HEART RATE: 63 BPM | DIASTOLIC BLOOD PRESSURE: 92 MMHG | HEIGHT: 74 IN

## 2025-06-12 DIAGNOSIS — K21.9 GASTROESOPHAGEAL REFLUX DISEASE WITHOUT ESOPHAGITIS: Primary | ICD-10-CM

## 2025-06-12 DIAGNOSIS — I10 PRIMARY HYPERTENSION: ICD-10-CM

## 2025-06-12 DIAGNOSIS — Z00.00 ANNUAL PHYSICAL EXAM: ICD-10-CM

## 2025-06-12 DIAGNOSIS — Z00.00 WELL ADULT EXAM: ICD-10-CM

## 2025-06-12 DIAGNOSIS — E78.00 HYPERCHOLESTEREMIA: ICD-10-CM

## 2025-06-12 LAB
POC APPEARANCE, URINE: CLEAR
POC BILIRUBIN, URINE: NEGATIVE
POC BLOOD, URINE: ABNORMAL
POC COLOR, URINE: YELLOW
POC GLUCOSE, URINE: NEGATIVE MG/DL
POC KETONES, URINE: NEGATIVE MG/DL
POC LEUKOCYTES, URINE: NEGATIVE
POC NITRITE,URINE: NEGATIVE
POC PH, URINE: 6 PH
POC PROTEIN, URINE: NEGATIVE MG/DL
POC SPECIFIC GRAVITY, URINE: 1.02
POC UROBILINOGEN, URINE: 0.2 EU/DL

## 2025-06-12 PROCEDURE — 99212 OFFICE O/P EST SF 10 MIN: CPT | Performed by: FAMILY MEDICINE

## 2025-06-12 PROCEDURE — 90471 IMMUNIZATION ADMIN: CPT | Performed by: FAMILY MEDICINE

## 2025-06-12 PROCEDURE — 1036F TOBACCO NON-USER: CPT | Performed by: FAMILY MEDICINE

## 2025-06-12 PROCEDURE — 3008F BODY MASS INDEX DOCD: CPT | Performed by: FAMILY MEDICINE

## 2025-06-12 PROCEDURE — 90715 TDAP VACCINE 7 YRS/> IM: CPT | Performed by: FAMILY MEDICINE

## 2025-06-12 PROCEDURE — 99396 PREV VISIT EST AGE 40-64: CPT | Performed by: FAMILY MEDICINE

## 2025-06-12 PROCEDURE — 81003 URINALYSIS AUTO W/O SCOPE: CPT | Performed by: FAMILY MEDICINE

## 2025-06-12 PROCEDURE — 3080F DIAST BP >= 90 MM HG: CPT | Performed by: FAMILY MEDICINE

## 2025-06-12 PROCEDURE — 3075F SYST BP GE 130 - 139MM HG: CPT | Performed by: FAMILY MEDICINE

## 2025-06-12 ASSESSMENT — PAIN SCALES - GENERAL: PAINLEVEL_OUTOF10: 0-NO PAIN

## 2025-06-12 NOTE — PROGRESS NOTES
"Subjective   Patient ID: Tom Treviño is a 55 y.o. male who presents for Annual Exam (Patient is here for his annual wellness exam ).    HPI   1. He is here for his annual CPE.  SH, FH, PMH and medication list were reviewed.  Last colonoscopy was in 5/25 with Dr. Cerrato which showed polyps.     2. He is here for follow-up of hypertension. He is on lisinopril 10 mg. This replaced hydrochlorothiazide which was leading to hypokalemia. He started the lisinopril in 3/24. Repeat BP by me is 136/84.       3.  he is also here for follow-up of gastroesophageal reflux disease he is on lansoprazole.  He has seen Dr. Cerrato for this.     4.   He is also here for hypercholesterolemia.  He is on no medication.  Recent LDL is 142. Cardiac risk factor is 11%.     5.  He is also here for neuropathy.  His SX are in his feet.  He is on no medication.  He was diagnosed by his podiatrist whom he still sees. It was felt to be related to lumbar stenosis and is followed by Dr. Brooks pain management.    Review of Systems  Denies headache, blurred vision, chest pain, shortness of breath, nausea or vomiting, change in bowel habits or leg pain or swelling.    Objective   BP (!) 136/92 (BP Location: Left arm, Patient Position: Sitting, BP Cuff Size: Large adult)   Pulse 63   Resp 18   Ht 1.88 m (6' 2\")   Wt 122 kg (270 lb)   SpO2 97%   BMI 34.67 kg/m²     Physical Exam  General appearance: Vital signs have been reviewed.  Comfortable.  Well-nourished and well-developed.He is alert and oriented x3 and appears his stated age.The patient is cooperative with exam.  Head: Hair pattern reveals a normal pattern for patient's age and face shows no abnormalities.  Eyes: PERRLA x2, EOMI x2, conjunctive a and sclera are clear.  Ears: External bilateral ears with normal helix, tragus and earlobe.Bilateral canals are normal.Bilateral tympanic membranes are pearly gray and landmarks are well visualized.  Nose: Nasal mucosa both nostrils reveals " no polyps, ulcerations or lesions.  Throat:Teeth are in good repair.  Posterior pharynx reveals no abnormalities.  Neck: Supple without lymphadenopathy, thyromegaly or carotid bruits.  Lungs:Clear to auscultation bilaterally with no wheezes, rales or rhonchi.  Cardiovascular: RRR without MRG.No carotid bruits.  Extremities without edema and pulses are intact.  Abdomen: Soft, NT, no masses, no hepatosplenomegaly.  Genitalia: No testicular masses.  No evidence of inguinal hernia.Nontender.  Rectal: No masses.  Prostate is normal in size and shape with no nodules. It is nontender.  Musculoskeletal: 5/5 and equal strength in bilateral upper and lower extremities.  Skin: Good turgor and without rashes.  Neurological: Good overall strength and no focal deficits.  Cranial nerves II through XII are grossly intact.  Psychiatric: Patient has appropriate judgment with good insight.  Mood is appropriate.    Assessment/Plan   Assessment & Plan  Primary hypertension  Continue lisinopril.  Cut back on salt in diet.  Recheck in 6 months.    Orders:    POCT UA Automated manually resulted    CT cardiac scoring wo IV contrast; Future    Hypercholesteremia  We had a long discussion regarding elevation.  Will get calcium CT score and if calcium is present we will start statin medication.  I will notify him of results.  In the meantime continue cut back on fats in diet and increase exercise.    Orders:    POCT UA Automated manually resulted    CT cardiac scoring wo IV contrast; Future    Annual physical exam    Orders:    POCT UA Automated manually resulted    Gastroesophageal reflux disease without esophagitis  Continue lansoprazole and following with Dr. Cerrato.  Recheck with me in 6 months.         Well adult exam  Anticipatory guidance given.  Will give Tdap today.

## 2025-06-12 NOTE — ASSESSMENT & PLAN NOTE
We had a long discussion regarding elevation.  Will get calcium CT score and if calcium is present we will start statin medication.  I will notify him of results.  In the meantime continue cut back on fats in diet and increase exercise.    Orders:    POCT UA Automated manually resulted    CT cardiac scoring wo IV contrast; Future

## 2025-06-12 NOTE — ASSESSMENT & PLAN NOTE
Continue lisinopril.  Cut back on salt in diet.  Recheck in 6 months.    Orders:    POCT UA Automated manually resulted    CT cardiac scoring wo IV contrast; Future

## 2025-07-05 DIAGNOSIS — I10 PRIMARY HYPERTENSION: ICD-10-CM

## 2025-07-07 RX ORDER — LISINOPRIL 10 MG/1
10 TABLET ORAL DAILY
Qty: 90 TABLET | Refills: 1 | Status: SHIPPED | OUTPATIENT
Start: 2025-07-07

## 2025-08-13 ENCOUNTER — HOSPITAL ENCOUNTER (OUTPATIENT)
Dept: RADIOLOGY | Facility: CLINIC | Age: 55
Discharge: HOME | End: 2025-08-13
Payer: COMMERCIAL

## 2025-08-13 DIAGNOSIS — E78.00 HYPERCHOLESTEREMIA: ICD-10-CM

## 2025-08-13 DIAGNOSIS — I10 PRIMARY HYPERTENSION: ICD-10-CM

## 2025-08-13 PROCEDURE — 75571 CT HRT W/O DYE W/CA TEST: CPT

## 2025-12-15 ENCOUNTER — APPOINTMENT (OUTPATIENT)
Dept: PRIMARY CARE | Facility: CLINIC | Age: 55
End: 2025-12-15
Payer: COMMERCIAL

## (undated) DEVICE — STAPLER, SKIN, PLUS, WIDE, 35

## (undated) DEVICE — STAPLER,  ECHELON CIRCULAR POWERED, 29MM, DISP

## (undated) DEVICE — SUTURE, VICRYL, 2-0, 27 IN, BR/SH 27, VIOLET

## (undated) DEVICE — STAPLER, SUREFORM 60, DAVINCI XI

## (undated) DEVICE — ARM, SUCTION IRRIGATOR, DAVINCI XI

## (undated) DEVICE — COVER, TIP HOT SHEARS ENDOWRIST

## (undated) DEVICE — DRIVER, NEEDLE LARGE, DAVINCI XI

## (undated) DEVICE — SUTURE, VICRYL, 3-0, 27 IN, CT-1, UNDYED

## (undated) DEVICE — DRAPE, SHEET, LARGE, 70 X 85IN, STERILE

## (undated) DEVICE — SUTURE, PDS II, 0 36 IN, CT-1, VIOLET

## (undated) DEVICE — GLOVE, SURGICAL, PROTEXIS PI , 7.0, PF, LF

## (undated) DEVICE — Device

## (undated) DEVICE — SEAL, UNIVERSAL 5-8MM  XI

## (undated) DEVICE — DRAPE, UNDERBUTTOCKS, W/FLUID CONTROL POUCH

## (undated) DEVICE — DEVICE, HAND ACCESS, GELPORT, 120MM

## (undated) DEVICE — CANNULA SEAL, STAPLER, DAVINCI XI

## (undated) DEVICE — POSITIONING SYSTEM, PAGAZZI, PATIENT

## (undated) DEVICE — ADHESIVE, SKIN, DERMABOND ADVANCED, 15CM, PEN-STYLE

## (undated) DEVICE — SUTURE, VICRYL, 0, 27 IN, UR-6, VIOLET

## (undated) DEVICE — CANNULA REDUCER, DAVINCI XI

## (undated) DEVICE — SEALER, VESSEL, EXTENDED

## (undated) DEVICE — OBTURATOR, BLADELESS , SU

## (undated) DEVICE — DRAPE, TIBURON W/ADHESIVE, 19 X 30

## (undated) DEVICE — RELOAD, SUREFORM STAPLER 60, 3.5 BLUE, DAVINCI XI

## (undated) DEVICE — IRRIGATOR, WOUND, HYDRO SURG PLUS, W/O TIP, DISP

## (undated) DEVICE — CATHETER TRAY, URETHRAL, FOLEY, 16 FR, SILICONE

## (undated) DEVICE — FORCEPS, PROGRASP, DAVINCI XI

## (undated) DEVICE — SCISSORS, MONOPOLAR, CURVED, 8MM

## (undated) DEVICE — GLOVE, SURGICAL, PROTEXIS PI BLUE W/NEUTHERA, 7.5, PF, LF

## (undated) DEVICE — GRASPING RETRACTOR, SMALL GRAPTOR DAVINCI XI

## (undated) DEVICE — TUBING, INSUFFLATION, W/ .3 MICRO FILTER & ADAPTER

## (undated) DEVICE — CLIP, LIGATING, HEM-O-LOCK, MLX, LARGE, LF, PURPLE

## (undated) DEVICE — APPLIER, CLIP LARGE XI

## (undated) DEVICE — SUTURE, MONOCRYL, 4-0, 27 IN, PS-2, UNDYED

## (undated) DEVICE — DRAPE, ARM XI

## (undated) DEVICE — POSITIONING KIT, PINK PAD XL, ADVANCED TRENDELENBURG

## (undated) DEVICE — SUTURE, VICRYL, 3-0, 27 IN, SH, VIOLET